# Patient Record
Sex: MALE | Race: WHITE | NOT HISPANIC OR LATINO | Employment: FULL TIME | URBAN - METROPOLITAN AREA
[De-identification: names, ages, dates, MRNs, and addresses within clinical notes are randomized per-mention and may not be internally consistent; named-entity substitution may affect disease eponyms.]

---

## 2017-02-07 ENCOUNTER — HOSPITAL ENCOUNTER (EMERGENCY)
Facility: CLINIC | Age: 47
Discharge: HOME | End: 2017-02-07
Attending: EMERGENCY MEDICINE | Admitting: EMERGENCY MEDICINE
Payer: COMMERCIAL

## 2017-02-07 ENCOUNTER — APPOINTMENT (OUTPATIENT)
Dept: GENERAL RADIOLOGY | Facility: CLINIC | Age: 47
End: 2017-02-07
Attending: EMERGENCY MEDICINE
Payer: COMMERCIAL

## 2017-02-07 VITALS
HEIGHT: 73 IN | TEMPERATURE: 97.9 F | DIASTOLIC BLOOD PRESSURE: 100 MMHG | RESPIRATION RATE: 20 BRPM | HEART RATE: 94 BPM | OXYGEN SATURATION: 97 % | SYSTOLIC BLOOD PRESSURE: 139 MMHG

## 2017-02-07 DIAGNOSIS — S83.92XA SPRAIN OF LEFT KNEE, UNSPECIFIED LIGAMENT, INITIAL ENCOUNTER: ICD-10-CM

## 2017-02-07 PROCEDURE — 99283 EMERGENCY DEPT VISIT LOW MDM: CPT

## 2017-02-07 PROCEDURE — 73560 X-RAY EXAM OF KNEE 1 OR 2: CPT | Mod: LT

## 2017-02-07 PROCEDURE — 99284 EMERGENCY DEPT VISIT MOD MDM: CPT | Performed by: EMERGENCY MEDICINE

## 2017-02-07 RX ORDER — HYDROCODONE BITARTRATE AND ACETAMINOPHEN 5; 325 MG/1; MG/1
TABLET ORAL
Qty: 20 TABLET | Refills: 0 | Status: SHIPPED | OUTPATIENT
Start: 2017-02-07 | End: 2017-06-26

## 2017-02-07 RX ORDER — CYCLOBENZAPRINE HCL 10 MG
10 TABLET ORAL 3 TIMES DAILY PRN
Qty: 20 TABLET | Refills: 0 | Status: SHIPPED | OUTPATIENT
Start: 2017-02-07 | End: 2017-02-13

## 2017-02-07 ASSESSMENT — ENCOUNTER SYMPTOMS
BACK PAIN: 0
ARTHRALGIAS: 1
NECK PAIN: 0

## 2017-02-07 NOTE — ED NOTES
fell in shower  on left knee  Patient having difficulty standing. Patient was recently in [facility] for hernia surgery  Patient is alert oriented   denies any other complaints

## 2017-02-07 NOTE — DISCHARGE INSTRUCTIONS
Knee Sprain    A sprain is an injury to the ligaments or capsule that holds a joint together. There are no broken bones. Most sprains take 3 to 6 weeks to heal. If it a severe sprain where the ligament is completely torn, it can take months to recover.  Most knee sprains are treated with a splint, knee immobilizer brace, or elastic wrap for support. Severe sprains may require surgery.  Home care    Stay off the injured leg as much as possible until you can walk on it without pain. If you have a lot of pain with walking, crutches or a walker may be prescribed. (These can be rented or purchased at many pharmacies and surgical or orthopedic supply stores). Follow your healthcare provider's advice about when to begin putting weight on that leg.    Keep your leg elevated to reduce pain and swelling. When sleeping, place a pillow under the injured leg. When sitting, support the injured leg so it is level with your waist. This is very important during the first 48 hours.    Apply an ice pack over the injured area for 15 to 20 minutes every 3 to 6 hours. You should do this for the first 24 to 48 hours. You can make an ice pack by filling a plastic bag that seals at the top with ice cubes and then wrapping it with a thin towel. Continue to use ice packs for relief of pain and swelling as needed. As the ice melts, be careful to avoid getting your wrap, splint, or cast wet. After 48 hours, apply heat (warm shower or warm bath) for 15 to 20 minutes several times a day, or alternate ice and heat. You can place the ice pack directly over the splint. If you have to wear a hook-and-loop knee brace, you can open it to apply the ice pack, or heat, directly to the knee. Never put ice directly on the skin. Always wrap the ice in a towel or other type of cloth.    You may use over-the-counter pain medicine to control pain, unless another pain medicine was prescribed.If you have chronic liver or kidney disease or ever had a stomach  ulcer or GI bleeding, talk with your healthcare provider before using these medicines.    If you were given a splint, keep it completely dry at all times. Bathe with your splint out of the water, protected with 2 large plastic bags, rubber-banded at the top end. If a fiberglass splint gets wet, you can dry it with a hair dryer. If you have a hook-and-loop knee brace, you can remove this to bathe, unless told otherwise.  Follow-up care  Follow up with your doctor as advised. Any X-rays you had today don t show any broken bones, breaks, or fractures. Sometimes fractures don t show up on the first X-ray. Bruises and sprains can sometimes hurt as much as a fracture. These injuries can take time to heal completely. If your symptoms don t improve or they get worse, talk with your doctor. You may need a repeat X-ray. If X-rays were taken, you will be told of any new findings that may affect your care.  Call 911  Call 911 if you have:     Shortness of breath     Chest pain  When to seek medical advice  Call your healthcare provider right away if any of these occur:    The splint or knee immobilizer brace becomes wet or soft    The fiberglass cast or splint remains wet for more than 24 hours    Pain or swelling increases    The injured leg or toes become cold, blue, numb, or tingly    3975-9116 [employer]. [address] [place] [address]. All rights reserved. This information is not intended as a substitute for professional medical care. Always follow your healthcare professional's instructions.

## 2017-02-07 NOTE — ED NOTES
Patient was getting ready for  Work when he fell this AM  The muscles in the leg are tightening up more now

## 2017-02-07 NOTE — ED AVS SNAPSHOT
[facility] Emergency Department    [address] [facility] [address] [address]    Phone:  [phone]    Fax:  [phone]                                       Ciro Monet   MRN: [id number]    Department:  [facility] Emergency Department   Date of Visit:  2/7/2017           After Visit Summary Signature Page     I have received my discharge instructions, and my questions have been answered. I have discussed any challenges I see with this plan with the nurse or doctor.    ..........................................................................................................................................  Patient/Patient Representative Signature      ..........................................................................................................................................  Patient Representative Print Name and Relationship to Patient    ..................................................               ................................................  Date                                            Time    ..........................................................................................................................................  Reviewed by Signature/Title    ...................................................              ..............................................  Date                                                            Time

## 2017-02-07 NOTE — ED PROVIDER NOTES
"  History     Chief Complaint   Patient presents with     Knee Injury     fell in shower  on left knee  Patient having difficulty standing      HPI  Ciro Monet is a 46 year old male who injured his left knee 3 weeks ago when he fell in the shower twisting the knee who aggravated the knee injury walking on stairs this morning.  He states that the left knee \"gave out\" causing him to fall backwards onto his buttocks.  He has pain in the anterior and posterior left knee with mild anterior swelling, but bruising or ecchymosis or associated distal CMS abnormality. Sharp, aching, constant, nonradiating severe pain is exacerbated by weight bearing and movement.  No other injury, trauma or acute complaints or concerns. No distal STS or other leg pain. No cough, hemoptysis, CP or SOB. No hx of DVT. Reports that he has a prior history of left knee problems which are deemed nonsurgical, and that he has had a prior MRI.  Previous records were reviewed:    7/18/16 MRI Right Knee without contrast:  IMPRESSION:   1. Small to moderate-sized tear at the junction of the anterior horn  and body of the lateral meniscus. There is additional extrusion of the  disc material beyond the joint margin.  2. Moderate chondromalacia in the posterior aspect of the lateral  compartment with mild chondromalacia of the patellofemoral joint.    I have reviewed the Medications, Allergies, Past Medical and Surgical History, and Social History in the Epic system.  Patient Active Problem List   Diagnosis     Moderate depressed bipolar I disorder (H)     Esophageal reflux     CHARITY (Obstructive Sleep Apnea)-severe ()     HYPERLIPIDEMIA LDL GOAL <160     Morbid obesity (H)     Localized superficial swelling, mass, or lump     Renal cyst needs ct 6/15     Incarcerated hernia     Umbilical hernia, incarcerated     Past Medical History   Diagnosis Date     CHARITY (obstructive sleep apnea) 4/2010       CPAP 15     Hiatal hernia      Morbid obesity " "with BMI of 45.0-49.9, adult (H)      Diabetes (H)      Past Surgical History   Procedure Laterality Date     Hc tooth extraction w/forcep       Hc repair epigastric hernia,reduc  11/23/07     Lasik  ?2005?     Hemorrhoid injection sclerosing solution       Herniorrhaphy umbilical N/A 10/29/2016     Procedure: HERNIORRHAPHY UMBILICAL;  Surgeon: Lori Barron MD;  Location: [facility] OR     No current facility-administered medications for this encounter.     Current Outpatient Prescriptions   Medication     HYDROcodone-acetaminophen (NORCO) 5-325 MG per tablet     cyclobenzaprine (FLEXERIL) 10 MG tablet     albuterol (ALBUTEROL) 108 (90 BASE) MCG/ACT inhaler     order for DME     metFORMIN (GLUCOPHAGE-XR) 500 MG 24 hr tablet     RISPERDAL CONSTA 50 MG injection     ABILIFY 15 MG tablet     OMEPRAZOLE     Allergies   Allergen Reactions     Adhesive Tape Rash     3M Plastic adhesive transpore tape per dad. Blisters and rash.     Pine Swelling and Difficulty breathing     Eyes swelling & difficultly breathing.     Social History   Substance Use Topics     Smoking status: Never Smoker      Smokeless tobacco: Never Used     Alcohol Use: No     Family History   Problem Relation Age of Onset     Hypertension Father      Genitourinary Problems Father      kidney stones     C.A.D. Maternal Grandfather      Genitourinary Problems Brother      kidney stones     Genitourinary Problems Brother      kidney stones       Review of Systems   Respiratory: Negative.    Cardiovascular: Negative.    Musculoskeletal: Positive for arthralgias (left knee injury and pain) and gait problem (unable to fully weight-bear on the left knee). Negative for back pain, joint swelling and neck pain.   Skin: Negative.      Physical Exam   BP: 133/85 mmHg  Pulse: 94  Temp: 97.9  F (36.6  C)  Resp: 20  Height: 185.4 cm (6' 1\")  SpO2: 93 %  Physical Exam   Constitutional: He is oriented to person, place, and time. He appears well-developed and " well-nourished. No distress.   Eyes: Conjunctivae and EOM are normal. No scleral icterus.   Cardiovascular: Normal rate, regular rhythm and intact distal pulses.    Pulmonary/Chest: Effort normal. No respiratory distress.   Musculoskeletal: Normal range of motion. He exhibits tenderness. He exhibits no edema or deformity.        Left knee: He exhibits swelling (small PP effusion) and effusion (small PP effusion). He exhibits normal range of motion, no ecchymosis, no deformity, no erythema, normal alignment, no LCL laxity, normal patellar mobility, no bony tenderness and no MCL laxity. Tenderness (anterior) found. No medial joint line, no lateral joint line, no MCL, no LCL and no patellar tendon tenderness noted.        Legs:  Neurological: He is alert and oriented to person, place, and time. He has normal strength. No sensory deficit.   Skin: Skin is warm and dry. No rash noted. No erythema. No pallor.   Psychiatric: He has a normal mood and affect. His behavior is normal.   Nursing note and vitals reviewed.      ED Course   Procedures         Results for orders placed or performed during the hospital encounter of 02/07/17   XR Knee Left 1/2 Views    Narrative    LEFT KNEE ONE TO TWO VIEWS 2/7/2017 8:36 AM     HISTORY: Pain, injury.    COMPARISON: None.      Impression    IMPRESSION: No acute-appearing bony abnormalities. Probable mild  medial joint space narrowing. Patellofemoral degenerative bony  spurring. Small joint space effusion.    JARRETT JIMENEZ MD   I independently reviewed the X-rays: Agree with the Radiologist's interpretation.       Labs Ordered and Resulted from Time of ED Arrival Up to the Time of Departure from the ED - No data to display    Assessments & Plan (with Medical Decision Making)   Patient with history of lateral meniscus tear with acute left knee injury walking downstairs this morning.  X-rays negative.  CMS function intact. Doubt DVT. Patient has a knee immobilizer/brace at home that he  can use.  He was provided crutches.  He requested an Rx for an opiate analgesic and muscle relaxant.  I prescribed 20 tablets and Norco and Flexeril.  I recommended he use NSAID first and use the Norco if needed.  He will follow-up with his orthopedist in the near future. Patient was provided instructions for supportive care and will return as needed for worsened condition or worsening symptoms, or new problems or concerns.     I have reviewed the nursing notes.    I have reviewed the findings, diagnosis, plan and need for follow up with the patient.    New Prescriptions    CYCLOBENZAPRINE (FLEXERIL) 10 MG TABLET    Take 1 tablet (10 mg) by mouth 3 times daily as needed for muscle spasms    HYDROCODONE-ACETAMINOPHEN (NORCO) 5-325 MG PER TABLET    1-2 tabs po q 4-6 hrs. prn pain       Final diagnoses:   Sprain of left knee, unspecified ligament, initial encounter       2/7/2017   [facility] EMERGENCY DEPARTMENT       Choco Stokes MD  02/13/17 0928

## 2017-02-07 NOTE — ED AVS SNAPSHOT
[facility] Emergency Department    [address] [facility] [address]    Phone:  [phone]    Fax:  [phone]                                       Ciro Monet   MRN: [id number]    Department:  [facility] Emergency Department   Date of Visit:  2/7/2017           Patient Information     Date Of Birth          1970        Your diagnoses for this visit were:     Sprain of left knee, unspecified ligament, initial encounter        You were seen by Choco Stokes MD.        Discharge Instructions         Knee Sprain    A sprain is an injury to the ligaments or capsule that holds a joint together. There are no broken bones. Most sprains take 3 to 6 weeks to heal. If it a severe sprain where the ligament is completely torn, it can take months to recover.  Most knee sprains are treated with a splint, knee immobilizer brace, or elastic wrap for support. Severe sprains may require surgery.  Home care    Stay off the injured leg as much as possible until you can walk on it without pain. If you have a lot of pain with walking, crutches or a walker may be prescribed. (These can be rented or purchased at many pharmacies and surgical or orthopedic supply stores). Follow your healthcare provider's advice about when to begin putting weight on that leg.    Keep your leg elevated to reduce pain and swelling. When sleeping, place a pillow under the injured leg. When sitting, support the injured leg so it is level with your waist. This is very important during the first 48 hours.    Apply an ice pack over the injured area for 15 to 20 minutes every 3 to 6 hours. You should do this for the first 24 to 48 hours. You can make an ice pack by filling a plastic bag that seals at the top with ice cubes and then wrapping it with a thin towel. Continue to use ice packs for relief of pain and swelling as needed. As the ice melts, be careful to avoid getting your wrap, splint, or cast wet. After 48 hours, apply  heat (warm shower or warm bath) for 15 to 20 minutes several times a day, or alternate ice and heat. You can place the ice pack directly over the splint. If you have to wear a hook-and-loop knee brace, you can open it to apply the ice pack, or heat, directly to the knee. Never put ice directly on the skin. Always wrap the ice in a towel or other type of cloth.    You may use over-the-counter pain medicine to control pain, unless another pain medicine was prescribed.If you have chronic liver or kidney disease or ever had a stomach ulcer or GI bleeding, talk with your healthcare provider before using these medicines.    If you were given a splint, keep it completely dry at all times. Bathe with your splint out of the water, protected with 2 large plastic bags, rubber-banded at the top end. If a fiberglass splint gets wet, you can dry it with a hair dryer. If you have a hook-and-loop knee brace, you can remove this to bathe, unless told otherwise.  Follow-up care  Follow up with your doctor as advised. Any X-rays you had today don t show any broken bones, breaks, or fractures. Sometimes fractures don t show up on the first X-ray. Bruises and sprains can sometimes hurt as much as a fracture. These injuries can take time to heal completely. If your symptoms don t improve or they get worse, talk with your doctor. You may need a repeat X-ray. If X-rays were taken, you will be told of any new findings that may affect your care.  Call 911  Call 911 if you have:     Shortness of breath     Chest pain  When to seek medical advice  Call your healthcare provider right away if any of these occur:    The splint or knee immobilizer brace becomes wet or soft    The fiberglass cast or splint remains wet for more than 24 hours    Pain or swelling increases    The injured leg or toes become cold, blue, numb, or tingly    5195-0336 [employer]. [address] [place] [address], [place] [address]. All rights reserved. This  information is not intended as a substitute for professional medical care. Always follow your healthcare professional's instructions.          24 Hour Appointment Hotline       To make an appointment at any [facility], call [phone] ([phone]). If you don't have a family doctor or clinic, we will help you find one. [facility] are conveniently located to serve the needs of you and your family.             Review of your medicines      START taking        Dose / Directions Last dose taken    cyclobenzaprine 10 MG tablet   Commonly known as:  FLEXERIL   Dose:  10 mg   Quantity:  20 tablet        Take 1 tablet (10 mg) by mouth 3 times daily as needed for muscle spasms   Refills:  0        HYDROcodone-acetaminophen 5-325 MG per tablet   Commonly known as:  NORCO   Quantity:  20 tablet        1-2 tabs po q 4-6 hrs. prn pain   Refills:  0          Our records show that you are taking the medicines listed below. If these are incorrect, please call your family doctor or clinic.        Dose / Directions Last dose taken    ABILIFY 15 MG tablet   Dose:  1 tablet   Generic drug:  ARIPiprazole        Take 1 tablet by mouth daily   Refills:  0        albuterol 108 (90 BASE) MCG/ACT Inhaler   Commonly known as:  albuterol   Quantity:  1 Inhaler        2 puffs at least TID and 2 puffs Q 4 hours prn.   Refills:  0        metFORMIN 500 MG 24 hr tablet   Commonly known as:  GLUCOPHAGE-XR        Takes four tablets in am   Refills:  3        OMEPRAZOLE        Refills:  0        order for DME        Equipment being ordered: BIPAP Patient Ciro Monet received a [employer] REMstar BiPap (60 Series) Bilevel. Pressures were set at 21/12 cm H2O.   Refills:  0        RISPERDAL CONSTA 50 MG injection   Dose:  50 mg   Generic drug:  risperiDONE microspheres        Inject 50 mg into the muscle every 14 days   Refills:  0                Prescriptions were sent or printed at these locations (2 Prescriptions)                    Other Prescriptions                Printed at Department/Unit printer (2 of 2)         HYDROcodone-acetaminophen (NORCO) 5-325 MG per tablet               cyclobenzaprine (FLEXERIL) 10 MG tablet                Procedures and tests performed during your visit     XR Knee Left 1/2 Views      Orders Needing Specimen Collection     None      Pending Results     Date and Time Order Name Status Description    2/7/2017 0802 XR Knee Left 1/2 Views Preliminary             Pending Culture Results     No orders found from 2/6/2017 to 2/8/2017.       Test Results from your hospital stay           2/7/2017  8:43 AM - Interface, Radiant Ib      Narrative     LEFT KNEE ONE TO TWO VIEWS 2/7/2017 8:36 AM     HISTORY: Pain, injury.    COMPARISON: None.        Impression     IMPRESSION: No acute-appearing bony abnormalities. Probable mild  medial joint space narrowing. Patellofemoral degenerative bony  spurring. Small joint space effusion.                Thank you for choosing [facility]       Thank you for choosing [facility] for your care. Our goal is always to provide you with excellent care. Hearing back from our patients is one way we can continue to improve our services. Please take a few minutes to complete the written survey that you may receive in the mail after you visit with us. Thank you!        [facility] Information     [facility] gives you secure access to your electronic health record. If you see a primary care provider, you can also send messages to your care team and make appointments. If you have questions, please call your primary care clinic.  If you do not have a primary care provider, please call [phone] and they will assist you.        Care EveryWhere ID     This is your Care EveryWhere ID. This could be used by other organizations to access your [facility] medical records  [id number]        After Visit Summary       This is your record. Keep this with you and show to your community pharmacist(s) and  doctor(s) at your next visit.

## 2017-02-09 ENCOUNTER — HOSPITAL ENCOUNTER (OUTPATIENT)
Dept: MRI IMAGING | Facility: CLINIC | Age: 47
Discharge: HOME | End: 2017-02-09
Attending: ORTHOPAEDIC SURGERY | Admitting: ORTHOPAEDIC SURGERY
Payer: COMMERCIAL

## 2017-02-09 DIAGNOSIS — M25.562 ACUTE PAIN OF LEFT KNEE: Primary | ICD-10-CM

## 2017-02-09 PROCEDURE — 73721 MRI JNT OF LWR EXTRE W/O DYE: CPT | Mod: LT

## 2017-02-13 ENCOUNTER — TELEPHONE (OUTPATIENT)
Dept: FAMILY MEDICINE | Facility: CLINIC | Age: 47
End: 2017-02-13

## 2017-02-13 DIAGNOSIS — E78.5 HYPERLIPIDEMIA LDL GOAL <160: Primary | ICD-10-CM

## 2017-02-13 ASSESSMENT — ENCOUNTER SYMPTOMS
RESPIRATORY NEGATIVE: 1
CARDIOVASCULAR NEGATIVE: 1
JOINT SWELLING: 0

## 2017-03-31 ENCOUNTER — OFFICE VISIT (OUTPATIENT)
Dept: FAMILY MEDICINE | Facility: CLINIC | Age: 47
End: 2017-03-31
Payer: COMMERCIAL

## 2017-03-31 ENCOUNTER — RADIANT APPOINTMENT (OUTPATIENT)
Dept: GENERAL RADIOLOGY | Facility: CLINIC | Age: 47
End: 2017-03-31
Attending: FAMILY MEDICINE
Payer: COMMERCIAL

## 2017-03-31 VITALS
OXYGEN SATURATION: 97 % | SYSTOLIC BLOOD PRESSURE: 118 MMHG | TEMPERATURE: 98.6 F | HEART RATE: 95 BPM | HEIGHT: 73 IN | BODY MASS INDEX: 41.75 KG/M2 | DIASTOLIC BLOOD PRESSURE: 82 MMHG | WEIGHT: 315 LBS

## 2017-03-31 DIAGNOSIS — R05.9 COUGH: ICD-10-CM

## 2017-03-31 DIAGNOSIS — J20.8 ACUTE BRONCHITIS DUE TO OTHER SPECIFIED ORGANISMS: Primary | ICD-10-CM

## 2017-03-31 PROCEDURE — 99214 OFFICE O/P EST MOD 30 MIN: CPT | Performed by: FAMILY MEDICINE

## 2017-03-31 PROCEDURE — 71020 XR CHEST 2 VW: CPT

## 2017-03-31 RX ORDER — ALBUTEROL SULFATE 90 UG/1
AEROSOL, METERED RESPIRATORY (INHALATION)
Qty: 1 INHALER | Refills: 0 | Status: SHIPPED | OUTPATIENT
Start: 2017-03-31 | End: 2018-12-14

## 2017-03-31 RX ORDER — CODEINE PHOSPHATE AND GUAIFENESIN 10; 100 MG/5ML; MG/5ML
1 SOLUTION ORAL EVERY 4 HOURS PRN
Qty: 120 ML | Refills: 0 | Status: SHIPPED | OUTPATIENT
Start: 2017-03-31 | End: 2017-06-26

## 2017-03-31 RX ORDER — ALBUTEROL SULFATE 90 UG/1
2 AEROSOL, METERED RESPIRATORY (INHALATION) EVERY 6 HOURS PRN
Qty: 1 INHALER | Refills: 0 | Status: CANCELLED | OUTPATIENT
Start: 2017-03-31

## 2017-03-31 RX ORDER — PREDNISONE 20 MG/1
40 TABLET ORAL DAILY
Qty: 10 TABLET | Refills: 0 | Status: SHIPPED | OUTPATIENT
Start: 2017-03-31 | End: 2017-04-05

## 2017-03-31 NOTE — NURSING NOTE
"Chief Complaint   Patient presents with     Cough       Initial /82 (BP Location: Right arm, Patient Position: Chair, Cuff Size: Adult Large)  Pulse 95  Temp 98.6  F (37  C) (Tympanic)  Ht 6' 1\" (1.854 m)  Wt (!) 370 lb 12.8 oz (168.2 kg)  SpO2 93%  BMI 48.92 kg/m2 Estimated body mass index is 48.92 kg/(m^2) as calculated from the following:    Height as of this encounter: 6' 1\" (1.854 m).    Weight as of this encounter: 370 lb 12.8 oz (168.2 kg).  Medication Reconciliation: complete   Tory Roberts LPN    "

## 2017-03-31 NOTE — PROGRESS NOTES
SUBJECTIVE:                                                    Ciro Monet is a 46 year old male who presents to clinic today for the following health issues:      ENT Symptoms             Symptoms: cc Present Absent Comment   Fever/Chills   x    Fatigue  x     Muscle Aches  x     Eye Irritation   x    Sneezing   x    Nasal Román/Drg   x    Sinus Pressure/Pain   x    Loss of smell   x    Dental pain   x    Sore Throat   x    Swollen Glands   x    Ear Pain/Fullness   x    Cough  x     Wheeze  x     Chest Pain   x Chest congestion    Shortness of breath  x     Rash   x    Other   x      Symptom duration:  past week    Symptom severity:  moderate    Treatments tried:  OTC cough medication    Contacts:       Cough and wheeze  Shortness of breath   Started about a week ago  No f/c   No sore throat or ear pain     Symptoms are improving overall   Over the counter cough med -delsym, helped a little     Has allergies  Maybe asthma?  He isn't sure.   Non smoker     Review of systems:   No f/c   No n/v   No d/c   Normal appetite        Problem list and histories reviewed & adjusted, as indicated.  Additional history: as documented      Patient Active Problem List   Diagnosis     Moderate depressed bipolar I disorder (H)     Esophageal reflux     CHARITY (Obstructive Sleep Apnea)-severe ()     HYPERLIPIDEMIA LDL GOAL <160     Morbid obesity (H)     Localized superficial swelling, mass, or lump     Renal cyst needs ct 6/15     Incarcerated hernia     Umbilical hernia, incarcerated     Current Outpatient Prescriptions   Medication     albuterol (ALBUTEROL) 108 (90 BASE) MCG/ACT inhaler     metFORMIN (GLUCOPHAGE-XR) 500 MG 24 hr tablet     RISPERDAL CONSTA 50 MG injection     ABILIFY 15 MG tablet     OMEPRAZOLE     HYDROcodone-acetaminophen (NORCO) 5-325 MG per tablet     order for DME     No current facility-administered medications for this visit.         Allergies   Allergen Reactions     Adhesive Tape Rash     3M  "Plastic adhesive transpore tape per dad. Blisters and rash.     Pine Swelling and Difficulty breathing     Eyes swelling & difficultly breathing.      /82 (BP Location: Right arm, Patient Position: Chair, Cuff Size: Adult Large)  Pulse 95  Temp 98.6  F (37  C) (Tympanic)  Ht 6' 1\" (1.854 m)  Wt (!) 370 lb 12.8 oz (168.2 kg)  SpO2 93%  BMI 48.92 kg/m2  GENERAL - Pt is alert and oriented in no acute distress.  Affect is flat, odd. Minimal eye contact.  HEET - Head is normocephalic, atraumatic.    PERRLA,EEMI. Conjunctiva are free of icterus or erythema.  TMs bilaterally normal.    Oropharynx free of masses and lesions, no tonsillar exudate or petechiae.    NECK - Neck is supple w/o LA or thyromegaly  RESPIRATORY - Clear to auscultation bilaterally.  No wheezing noted  CV - RRR, no murmurs, rubs, gallops.   Normal gait and station       chest x-ray   I independently visualized the xray and my findings were negative, similar to previous xray .   Radiology review pending.        Clinic course - He was given an albuterol neb treatment. Response was good, he felt better   O2 sats before the neb were 93 and afterwards were 98    Assessment/Plan -      (J20.8) Acute bronchitis due to other specified organisms  (primary encounter diagnosis)  Comment: Discussed diagnosis and treatment. Albuterol inhaler q4hrs. Prednisone burst. Nighttime cough syrup prn. Discussed likely viral etiology of his URI  and supportive cares. Recommended humidifier in the bedroom.  Recommend pushing fluids and tylenol for discomfort prn.  Recommended that he try to get good sleep.   Discussed importance of good hand hygiene.  Understands that he needs to return to clinic if symptoms persist, change, or worsen.  The patient indicates understanding of these issues and agrees with the plan.    Plan: predniSONE (DELTASONE) 20 MG tablet,         guaiFENesin-codeine (ROBITUSSIN AC) 100-10         MG/5ML SOLN solution            (R05) " Cough  Comment:   Plan: XR Chest 2 Views, albuterol (ALBUTEROL) 108 (90        BASE) MCG/ACT Inhaler             ERNESTINE Rae MD

## 2017-03-31 NOTE — NURSING NOTE
The following nebulizer treatment was given:     MEDICATION: Albuterol Sulfate 2.5 mg  : RISHI  LOT #: [id number]  EXPIRATION DATE:  8/2017  NDC # 2237-0432-58   Verbal order per Dr. Rae, verbal order read back.   Neb was given once in clinic at 4:00pm.   Sekou Jones CMA

## 2017-03-31 NOTE — MR AVS SNAPSHOT
"              After Visit Summary   3/31/2017    Ciro Monet    MRN: [id number]           Patient Information     Date Of Birth          1970        Visit Information        Provider Department      3/31/2017 3:30 PM Indira Rae MD [facility]        Today's Diagnoses     Acute bronchitis due to other specified organisms    -  1    Cough           Follow-ups after your visit        Who to contact     Normal or non-critical lab and imaging results will be communicated to you by 1jiajiehart, letter or phone within 4 business days after the clinic has received the results. If you do not hear from us within 7 days, please contact the clinic through 1jiajiehart or phone. If you have a critical or abnormal lab result, we will notify you by phone as soon as possible.  Submit refill requests through Ziippi or call your pharmacy and they will forward the refill request to us. Please allow 3 business days for your refill to be completed.          If you need to speak with a  for additional information , please call: [phone]             Additional Information About Your Visit        1jiajieharCooliris Information     Ziippi gives you secure access to your electronic health record. If you see a primary care provider, you can also send messages to your care team and make appointments. If you have questions, please call your primary care clinic.  If you do not have a primary care provider, please call [phone] and they will assist you.        Care EveryWhere ID     This is your Care EveryWhere ID. This could be used by other organizations to access your [place] medical records  [id number]        Your Vitals Were     Pulse Temperature Height Pulse Oximetry BMI (Body Mass Index)       95 98.6  F (37  C) (Tympanic) 6' 1\" (1.854 m) 97% 48.92 kg/m2        Blood Pressure from Last 3 Encounters:   03/31/17 118/82   02/07/17 (!) 139/100   12/07/16 138/86    Weight from Last 3 Encounters:   03/31/17 " (!) 370 lb 12.8 oz (168.2 kg)   12/05/16 (!) 366 lb 12.8 oz (166.4 kg)   11/15/16 (!) 367 lb 14.4 oz (166.9 kg)                 Today's Medication Changes          These changes are accurate as of: 3/31/17  4:51 PM.  If you have any questions, ask your nurse or doctor.               Start taking these medicines.        Dose/Directions    guaiFENesin-codeine 100-10 MG/5ML Soln solution   Commonly known as:  ROBITUSSIN AC   Used for:  Acute bronchitis due to other specified organisms   Started by:  Indira Rae MD        Dose:  1 tsp.   Take 5 mLs by mouth every 4 hours as needed for cough   Quantity:  120 mL   Refills:  0       predniSONE 20 MG tablet   Commonly known as:  DELTASONE   Used for:  Acute bronchitis due to other specified organisms   Started by:  Indira Rae MD        Dose:  40 mg   Take 2 tablets (40 mg) by mouth daily for 5 days   Quantity:  10 tablet   Refills:  0            Where to get your medicines      These medications were sent to [id number] - [place] [id number] [address]     Phone:  [phone]     albuterol 108 (90 BASE) MCG/ACT Inhaler    predniSONE 20 MG tablet         Some of these will need a paper prescription and others can be bought over the counter.  Ask your nurse if you have questions.     Bring a paper prescription for each of these medications     guaiFENesin-codeine 100-10 MG/5ML Soln solution                Primary Care Provider Office Phone # Fax #    Keegan Morgan [phone]       [facility] [address] [facility] [address]        Thank you!     Thank you for choosing [facility]  for your care. Our goal is always to provide you with excellent care. Hearing back from our patients is one way we can continue to improve our services. Please take a few minutes to complete the written survey that you may receive in the mail after your visit  with us. Thank you!             Your Updated Medication List - Protect others around you: Learn how to safely use, store and throw away your medicines at www.disposemymeds.org.          This list is accurate as of: 3/31/17  4:51 PM.  Always use your most recent med list.                   Brand Name Dispense Instructions for use    ABILIFY 15 MG tablet   Generic drug:  ARIPiprazole      Take 1 tablet by mouth daily       albuterol 108 (90 BASE) MCG/ACT Inhaler    albuterol    1 Inhaler    2 puffs at least TID and 2 puffs Q 4 hours prn.       guaiFENesin-codeine 100-10 MG/5ML Soln solution    ROBITUSSIN AC    120 mL    Take 5 mLs by mouth every 4 hours as needed for cough       HYDROcodone-acetaminophen 5-325 MG per tablet    NORCO    20 tablet    1-2 tabs po q 4-6 hrs. prn pain       metFORMIN 500 MG 24 hr tablet    GLUCOPHAGE-XR     Takes four tablets in am       OMEPRAZOLE          order for DME      Equipment being ordered: BIPAP Patient Ciro Monet received a Piggybackrstar BiPap (60 Series) Bilevel. Pressures were set at 21/12 cm H2O.       predniSONE 20 MG tablet    DELTASONE    10 tablet    Take 2 tablets (40 mg) by mouth daily for 5 days       RISPERDAL CONSTA 50 MG injection   Generic drug:  risperiDONE microspheres      Inject 50 mg into the muscle every 14 days

## 2017-06-26 ENCOUNTER — TELEPHONE (OUTPATIENT)
Dept: FAMILY MEDICINE | Facility: CLINIC | Age: 47
End: 2017-06-26

## 2017-06-26 ENCOUNTER — OFFICE VISIT (OUTPATIENT)
Dept: FAMILY MEDICINE | Facility: CLINIC | Age: 47
End: 2017-06-26
Payer: COMMERCIAL

## 2017-06-26 VITALS
TEMPERATURE: 98.5 F | HEIGHT: 73 IN | SYSTOLIC BLOOD PRESSURE: 119 MMHG | BODY MASS INDEX: 41.75 KG/M2 | OXYGEN SATURATION: 97 % | WEIGHT: 315 LBS | HEART RATE: 94 BPM | DIASTOLIC BLOOD PRESSURE: 80 MMHG

## 2017-06-26 DIAGNOSIS — G47.33 OSA (OBSTRUCTIVE SLEEP APNEA): Primary | ICD-10-CM

## 2017-06-26 DIAGNOSIS — Z00.00 ROUTINE GENERAL MEDICAL EXAMINATION AT A HEALTH CARE FACILITY: ICD-10-CM

## 2017-06-26 DIAGNOSIS — E78.5 HYPERLIPIDEMIA LDL GOAL <160: ICD-10-CM

## 2017-06-26 DIAGNOSIS — B35.9 DERMATOPHYTOSIS: ICD-10-CM

## 2017-06-26 DIAGNOSIS — E66.01 MORBID OBESITY DUE TO EXCESS CALORIES (H): ICD-10-CM

## 2017-06-26 DIAGNOSIS — Z00.01 ENCOUNTER FOR ROUTINE ADULT MEDICAL EXAM WITH ABNORMAL FINDINGS: ICD-10-CM

## 2017-06-26 DIAGNOSIS — F31.32 MODERATE DEPRESSED BIPOLAR I DISORDER (H): ICD-10-CM

## 2017-06-26 LAB
ALBUMIN SERPL-MCNC: 4.1 G/DL (ref 3.4–5)
ALP SERPL-CCNC: 84 U/L (ref 40–150)
ALT SERPL W P-5'-P-CCNC: 42 U/L (ref 0–70)
ANION GAP SERPL CALCULATED.3IONS-SCNC: 5 MMOL/L (ref 3–14)
AST SERPL W P-5'-P-CCNC: 26 U/L (ref 0–45)
BILIRUB SERPL-MCNC: 0.3 MG/DL (ref 0.2–1.3)
BUN SERPL-MCNC: 15 MG/DL (ref 7–30)
CALCIUM SERPL-MCNC: 9.5 MG/DL (ref 8.5–10.1)
CHLORIDE SERPL-SCNC: 108 MMOL/L (ref 94–109)
CHOLEST SERPL-MCNC: 146 MG/DL
CO2 SERPL-SCNC: 25 MMOL/L (ref 20–32)
CREAT SERPL-MCNC: 0.8 MG/DL (ref 0.66–1.25)
GFR SERPL CREATININE-BSD FRML MDRD: NORMAL ML/MIN/1.7M2
GLUCOSE SERPL-MCNC: 83 MG/DL (ref 70–99)
HBA1C MFR BLD: 5.4 % (ref 4.3–6)
HDLC SERPL-MCNC: 41 MG/DL
LDLC SERPL CALC-MCNC: 81 MG/DL
NONHDLC SERPL-MCNC: 105 MG/DL
POTASSIUM SERPL-SCNC: 4.3 MMOL/L (ref 3.4–5.3)
PROT SERPL-MCNC: 7.9 G/DL (ref 6.8–8.8)
SODIUM SERPL-SCNC: 138 MMOL/L (ref 133–144)
TRIGL SERPL-MCNC: 121 MG/DL

## 2017-06-26 PROCEDURE — 99396 PREV VISIT EST AGE 40-64: CPT | Performed by: FAMILY MEDICINE

## 2017-06-26 PROCEDURE — 83036 HEMOGLOBIN GLYCOSYLATED A1C: CPT | Performed by: FAMILY MEDICINE

## 2017-06-26 PROCEDURE — 80053 COMPREHEN METABOLIC PANEL: CPT | Performed by: FAMILY MEDICINE

## 2017-06-26 PROCEDURE — 80061 LIPID PANEL: CPT | Performed by: FAMILY MEDICINE

## 2017-06-26 PROCEDURE — 36415 COLL VENOUS BLD VENIPUNCTURE: CPT | Performed by: FAMILY MEDICINE

## 2017-06-26 NOTE — NURSING NOTE
"Chief Complaint   Patient presents with     Physical       Initial There were no vitals taken for this visit. Estimated body mass index is 48.92 kg/(m^2) as calculated from the following:    Height as of 3/31/17: 6' 1\" (1.854 m).    Weight as of 3/31/17: 370 lb 12.8 oz (168.2 kg).  Medication Reconciliation: complete   Aaliyah Joshua CMA    "

## 2017-06-26 NOTE — TELEPHONE ENCOUNTER
[facility] calling regarding Terbinafine gel.  They can not get the gel and wondering if they can fill Terbinafine 1% cream instead?  Please review and advise. Thank you..Leighann Chawla

## 2017-06-26 NOTE — LETTER
[facility]  [address]  [facility] [address]  Phone: [phone]    June 28, 2017    Ciro Monet  [address] [place] [address]   [facility] [address]              Dear Mr. Monet,    Labs look good              Sincerely,      Dr. Keegan Morgan/ daljit

## 2017-06-26 NOTE — MR AVS SNAPSHOT
After Visit Summary   6/26/2017    Ciro Monet    MRN: [id number]           Patient Information     Date Of Birth          1970        Visit Information        Provider Department      6/26/2017 3:40 PM Keegan Morgan MD [facility]        Today's Diagnoses     CHARITY (Obstructive Sleep Apnea)-severe ()    -  1    Routine general medical examination at a health care facility        Encounter for routine adult medical exam with abnormal findings        Moderate depressed bipolar I disorder (H)        Morbid obesity due to excess calories (H)        Hyperlipidemia LDL goal <160        Dermatophytosis          Care Instructions      Preventive Health Recommendations  Male Ages 40 to 49    Yearly exam:             See your health care provider every year in order to  o   Review health changes.   o   Discuss preventive care.    o   Review your medicines if your doctor has prescribed any.    You should be tested each year for STDs (sexually transmitted diseases) if you re at risk.     Have a cholesterol test every 5 years.     Have a colonoscopy (test for colon cancer) if someone in your family has had colon cancer or polyps before age 50.     After age 45, have a diabetes test (fasting glucose). If you are at risk for diabetes, you should have this test every 3 years.      Talk with your health care provider about whether or not a prostate cancer screening test (PSA) is right for you.    Shots: Get a flu shot each year. Get a tetanus shot every 10 years.     Nutrition:    Eat at least 5 servings of fruits and vegetables daily.     Eat whole-grain bread, whole-wheat pasta and brown rice instead of white grains and rice.     Talk to your provider about Calcium and Vitamin D.     Lifestyle    Exercise for at least 150 minutes a week (30 minutes a day, 5 days a week). This will help you control your weight and prevent disease.     Limit alcohol to one drink per day.     No smoking.  "    Wear sunscreen to prevent skin cancer.     See your dentist every six months for an exam and cleaning.              Follow-ups after your visit        Your next 10 appointments already scheduled     Jun 26, 2017  3:40 PM CDT   PHYSICAL with Keegan Morgan MD   [facility] ([facility])    [address] [facility] [id number] [phone]              Who to contact     Normal or non-critical lab and imaging results will be communicated to you by Pax Worldwidehart, letter or phone within 4 business days after the clinic has received the results. If you do not hear from us within 7 days, please contact the clinic through Only Mallorcat or phone. If you have a critical or abnormal lab result, we will notify you by phone as soon as possible.  Submit refill requests through [employer] or call your pharmacy and they will forward the refill request to us. Please allow 3 business days for your refill to be completed.          If you need to speak with a  for additional information , please call: [phone]             Additional Information About Your Visit        [employer] Information     [employer] gives you secure access to your electronic health record. If you see a primary care provider, you can also send messages to your care team and make appointments. If you have questions, please call your primary care clinic.  If you do not have a primary care provider, please call [phone] and they will assist you.        Care EveryWhere ID     This is your Care EveryWhere ID. This could be used by other organizations to access your [place] medical records  [id number]        Your Vitals Were     Pulse Temperature Height Pulse Oximetry BMI (Body Mass Index)       94 98.5  F (36.9  C) (Tympanic) 6' 1\" (1.854 m) 97% 48.79 kg/m2        Blood Pressure from Last 3 Encounters:   06/26/17 119/80   03/31/17 118/82   02/07/17 (!) 139/100    Weight from Last 3 Encounters:   06/26/17 (!) 369 lb 12.8 oz " (167.7 kg)   03/31/17 (!) 370 lb 12.8 oz (168.2 kg)   12/05/16 (!) 366 lb 12.8 oz (166.4 kg)              We Performed the Following     Comprehensive metabolic panel     Hemoglobin A1c     Lipid Profile with reflex to direct LDL          Today's Medication Changes          These changes are accurate as of: 6/26/17  3:35 PM.  If you have any questions, ask your nurse or doctor.               Start taking these medicines.        Dose/Directions    terbinafine 1 % Gel   Commonly known as:  LAMISIL ADVANCED   Used for:  Dermatophytosis   Started by:  Keegan Morgan MD        Dose:  0.25 inch   Apply 0.25 inches topically 2 times daily for 21 days For fungal infection not resolved with other antifungals (e.g. Clotrimazole)   Quantity:  1 Tube   Refills:  3            Where to get your medicines      These medications were sent to [facility] - [place] [address], [facility] [address]     Phone:  [phone]     terbinafine 1 % Gel                Primary Care Provider Office Phone # Fax #    Keegan Morgan [phone]       [facility] [address] [place] [address]        Equal Access to Services     FRANCHESCA HERNANDEZ AH: Hadii nella farrell hadasho Soomaali, waaxda luqadaha, qaybta kaalmada adeegyada, tram diamond. So [facility] [phone].    ATENCIÓN: Si habla español, tiene a crane disposición servicios gratuitos de asistencia lingüística. Llame al [phone].    We comply with applicable federal civil rights laws and [place] laws. We do not discriminate on the basis of race, color, national origin, age, disability sex, sexual orientation or gender identity.            Thank you!     Thank you for choosing [facility]3]  for your care. Our goal is always to provide you with excellent care. Hearing back from our patients is one way we can continue to improve our services. Please take a few minutes  to complete the written survey that you may receive in the mail after your visit with us. Thank you!             Your Updated Medication List - Protect others around you: Learn how to safely use, store and throw away your medicines at www.disposemymeds.org.          This list is accurate as of: 6/26/17  3:35 PM.  Always use your most recent med list.                   Brand Name Dispense Instructions for use Diagnosis    ABILIFY 15 MG tablet   Generic drug:  ARIPiprazole      Take 1 tablet by mouth daily        albuterol 108 (90 BASE) MCG/ACT Inhaler    albuterol    1 Inhaler    2 puffs at least TID and 2 puffs Q 4 hours prn.    Cough       metFORMIN 500 MG 24 hr tablet    GLUCOPHAGE-XR     Takes four tablets in am        OMEPRAZOLE           order for DME      Equipment being ordered: BIPAP Patient Ciro Monet received a Me[employer]s REMstar BiPap (60 Series) Bilevel. Pressures were set at 21/12 cm H2O.        RISPERDAL CONSTA 50 MG injection   Generic drug:  risperiDONE microspheres      Inject 50 mg into the muscle every 14 days        terbinafine 1 % Gel    LAMISIL ADVANCED    1 Tube    Apply 0.25 inches topically 2 times daily for 21 days For fungal infection not resolved with other antifungals (e.g. Clotrimazole)    Dermatophytosis

## 2017-06-26 NOTE — PROGRESS NOTES
SUBJECTIVE:     CC: Ciro Monet is an 46 year old male who presents for preventative health visit.     Healthy Habits:    Do you get at least three servings of calcium containing foods daily (dairy, green leafy vegetables, etc.)? yes    Amount of exercise or daily activities, outside of work: 30 minutes per day 3-4 days a week    Problems taking medications regularly No    Medication side effects: No    Have you had an eye exam in the past two years? yes    Do you see a dentist twice per year? yes  Do you have sleep apnea, excessive snoring or daytime drowsiness?Yes sleep apnea      doimng well abilify  Works well for him but hard to loose weight and it afects his blood sugars  On metformin for this  [facility]  Wt Readings from Last 10 Encounters:   06/26/17 (!) 369 lb 12.8 oz (167.7 kg)   03/31/17 (!) 370 lb 12.8 oz (168.2 kg)   12/05/16 (!) 366 lb 12.8 oz (166.4 kg)   11/15/16 (!) 367 lb 14.4 oz (166.9 kg)   11/09/16 (!) 365 lb 9.6 oz (165.8 kg)   11/03/16 (!) 371 lb 3.2 oz (168.4 kg)   05/27/16 (!) 370 lb (167.8 kg)   05/02/16 (!) 369 lb 9.6 oz (167.6 kg)   09/23/15 (!) 370 lb (167.8 kg)   09/22/15 (!) 371 lb (168.3 kg)     Patient informed that anything we discuss that is not related to preventative medicine, may be billed for; patient verbalizes understanding.    **He wants you to look at his left foot. And veins in legs.  **Possible blood work??        Today's PHQ-2 Score:   PHQ-2 ( 1999 Pfizer) 6/26/2017 5/2/2016   Q1: Little interest or pleasure in doing things 0 1   Q2: Feeling down, depressed or hopeless 0 1   PHQ-2 Score 0 2       Abuse: Current or Past(Physical, Sexual or Emotional)- No  Do you feel safe in your environment - Yes    Social History   Substance Use Topics     Smoking status: Never Smoker     Smokeless tobacco: Never Used     Alcohol use No     The patient does not drink >3 drinks per day nor >7 drinks per week.    Last PSA: No results found for:  "PSA    Recent Labs   Lab Test  07/30/16   0706  05/09/15   0728   11/02/13   0756   CHOL  147  149   --   159   HDL  44  44   --   45   LDL  86  93   < >  99   TRIG  87  62   --   80   CHOLHDLRATIO   --   3.4   --   4.0   NHDL  103   --    --    --     < > = values in this interval not displayed.       Reviewed orders with patient. Reviewed health maintenance and updated orders accordingly - Yes    Reviewed and updated as needed this visit by clinical staff  Tobacco  Allergies  Med Hx  Surg Hx  Fam Hx  Soc Hx        Reviewed and updated as needed this visit by Provider            ROS:  C: NEGATIVE for fever, chills, change in weight  I: NEGATIVE for worrisome rashes, moles or lesions  E: NEGATIVE for vision changes or irritation  ENT: NEGATIVE for ear, mouth and throat problems  R: NEGATIVE for significant cough or SOB  CV: NEGATIVE for chest pain, palpitations or peripheral edema  GI: NEGATIVE for nausea, abdominal pain, heartburn, or change in bowel habits   male: negative for dysuria, hematuria, decreased urinary stream, erectile dysfunction, urethral discharge  M: NEGATIVE for significant arthralgias or myalgia  N: NEGATIVE for weakness, dizziness or paresthesias  P: NEGATIVE for changes in mood or affect    Problem list, Medication list, Allergies, and Medical/Social/Surgical histories reviewed in EPIC and updated as appropriate.  OBJECTIVE:     /80 (BP Location: Right arm, Patient Position: Sitting, Cuff Size: Adult Large)  Pulse 94  Temp 98.5  F (36.9  C) (Tympanic)  Ht 6' 1\" (1.854 m)  Wt (!) 369 lb 12.8 oz (167.7 kg)  SpO2 97%  BMI 48.79 kg/m2  EXAM:  GENERAL: healthy, alert and no distress  NECK: no adenopathy, no asymmetry, masses, or scars and thyroid normal to palpation  RESP: lungs clear to auscultation - no rales, rhonchi or wheezes  CV: regular rate and rhythm, normal S1 S2, no S3 or S4, no murmur, click or rub, no peripheral edema and peripheral pulses strong  ABDOMEN: soft, " "nontender, no hepatosplenomegaly, no masses and bowel sounds normal  MS: no gross musculoskeletal defects noted, no edema    ASSESSMENT/PLAN:     1. Routine general medical examination at a health care facility    3. CHARITY (Obstructive Sleep Apnea)-severe ()  Good control.  Continue currant medications, continue to monito continue cpap    4. Moderate depressed bipolar I disorder (H)      5. Morbid obesity due to excess calories (H)  Continue to work on exercise    6. Hyperlipidemia LDL goal <160    - Hemoglobin A1c  - Lipid Profile with reflex to direct LDL  - Comprehensive metabolic panel    7. Dermatophytosis    - terbinafine (LAMISIL ADVANCED) 1 % GEL; Apply 0.25 inches topically 2 times daily for 21 days For fungal infection not resolved with other antifungals (e.g. Clotrimazole)  Dispense: 1 Tube; Refill: 3    COUNSELING:  Reviewed preventive health counseling, as reflected in patient instructions       Regular exercise       Healthy diet/nutrition       Vision screening       Hearing screening         reports that he has never smoked. He has never used smokeless tobacco.    Estimated body mass index is 48.79 kg/(m^2) as calculated from the following:    Height as of this encounter: 6' 1\" (1.854 m).    Weight as of this encounter: 369 lb 12.8 oz (167.7 kg).   Weight management plan: Discussed healthy diet and exercise guidelines and patient will follow up in 3 months in clinic to re-evaluate.    Counseling Resources:  ATP IV Guidelines  Pooled Cohorts Equation Calculator  FRAX Risk Assessment  ICSI Preventive Guidelines  Dietary Guidelines for Americans, 2010  USDA's MyPlate  ASA Prophylaxis  Lung CA Screening    Keegan Morgan MD  [facility]  "

## 2017-06-27 RX ORDER — PRENATAL VIT 91/IRON/FOLIC/DHA 28-975-200
COMBINATION PACKAGE (EA) ORAL 2 TIMES DAILY
Qty: 30 G | Refills: 1 | Status: SHIPPED | OUTPATIENT
Start: 2017-06-27 | End: 2019-05-08

## 2017-10-19 ENCOUNTER — ALLIED HEALTH/NURSE VISIT (OUTPATIENT)
Dept: FAMILY MEDICINE | Facility: CLINIC | Age: 47
End: 2017-10-19
Payer: COMMERCIAL

## 2017-10-19 DIAGNOSIS — Z23 NEED FOR PROPHYLACTIC VACCINATION AND INOCULATION AGAINST INFLUENZA: Primary | ICD-10-CM

## 2017-10-19 PROCEDURE — 99207 ZZC NO CHARGE NURSE ONLY: CPT

## 2017-10-19 PROCEDURE — 90686 IIV4 VACC NO PRSV 0.5 ML IM: CPT

## 2017-10-19 PROCEDURE — 90471 IMMUNIZATION ADMIN: CPT

## 2017-10-19 NOTE — MR AVS SNAPSHOT
After Visit Summary   10/19/2017    Ciro Monet    MRN: [id number]           Patient Information     Date Of Birth          1970        Visit Information        Provider Department      10/19/2017 3:25 PM [facility] FLU SHOT CLINIC [facility]        Today's Diagnoses     Need for prophylactic vaccination and inoculation against influenza    -  1       Follow-ups after your visit        Who to contact     If you have questions or need follow up information about today's clinic visit or your schedule please contact [facility] directly at [phone].  Normal or non-critical lab and imaging results will be communicated to you by [employer], letter or phone within 4 business days after the clinic has received the results. If you do not hear from us within 7 days, please contact the clinic through [employer] or phone. If you have a critical or abnormal lab result, we will notify you by phone as soon as possible.  Submit refill requests through [employer] or call your pharmacy and they will forward the refill request to us. Please allow 3 business days for your refill to be completed.          Additional Information About Your Visit        MyChart Information     [employer] gives you secure access to your electronic health record. If you see a primary care provider, you can also send messages to your care team and make appointments. If you have questions, please call your primary care clinic.  If you do not have a primary care provider, please call [phone] and they will assist you.        Care EveryWhere ID     This is your Care EveryWhere ID. This could be used by other organizations to access your [facility] medical records  [id number]         Blood Pressure from Last 3 Encounters:   06/26/17 119/80   03/31/17 118/82   02/07/17 (!) 139/100    Weight from Last 3 Encounters:   06/26/17 (!) 369 lb 12.8 oz (167.7 kg)   03/31/17 (!) 370 lb 12.8 oz (168.2 kg)   12/05/16 (!) 366 lb 12.8 oz  (166.4 kg)              We Performed the Following     FLU VAC, SPLIT VIRUS IM > 3 YO (QUADRIVALENT) [92813]     Vaccine Administration, Initial [26149]        Primary Care Provider Office Phone # Fax #    Keegan Morgan [phone]       [facility] [address] [address] [facility] [address]        Equal Access to Services     Or[facility] HospitalFAUSTINA : Hadii aad ku hadasho Soomaali, waaxda luqadaha, qaybta kaalmada adeegyada, waxay idiin hayaan adeeg kharash la'aan . So [facility] [phone].    ATENCIÓN: Si habla español, tiene a crane disposición servicios gratuitos de asistencia lingüística. Ana Maríaame al [phone].    We comply with applicable federal civil rights laws and [place] laws. We do not discriminate on the basis of race, color, national origin, age, disability, sex, sexual orientation, or gender identity.            Thank you!     Thank you for choosing [facility]  for your care. Our goal is always to provide you with excellent care. Hearing back from our patients is one way we can continue to improve our services. Please take a few minutes to complete the written survey that you may receive in the mail after your visit with us. Thank you!             Your Updated Medication List - Protect others around you: Learn how to safely use, store and throw away your medicines at www.disposemymeds.org.          This list is accurate as of: 10/19/17  3:48 PM.  Always use your most recent med list.                   Brand Name Dispense Instructions for use Diagnosis    ABILIFY 15 MG tablet   Generic drug:  ARIPiprazole      Take 1 tablet by mouth daily        albuterol 108 (90 BASE) MCG/ACT Inhaler    PROAIR HFA    1 Inhaler    2 puffs at least TID and 2 puffs Q 4 hours prn.    Cough       metFORMIN 500 MG 24 hr tablet    GLUCOPHAGE-XR     Takes four tablets in am        OMEPRAZOLE           order for DME      Equipment being ordered: BIPAP Patient Ciro DANK Chiki received a [employer]andi  REMstar BiPap (60 Series) Bilevel. Pressures were set at 21/12 cm H2O.        RISPERDAL CONSTA 50 MG injection   Generic drug:  risperiDONE microspheres      Inject 50 mg into the muscle every 14 days        terbinafine 1 % cream    lamISIL AT    30 g    Apply topically 2 times daily For fungal infection not resolved with other antifungals (e.g. Clotrimazole)    Dermatophytosis

## 2017-10-19 NOTE — PROGRESS NOTES

## 2017-11-27 ENCOUNTER — MEDICAL CORRESPONDENCE (OUTPATIENT)
Dept: HEALTH INFORMATION MANAGEMENT | Facility: CLINIC | Age: 47
End: 2017-11-27

## 2017-12-06 ENCOUNTER — TELEPHONE (OUTPATIENT)
Dept: FAMILY MEDICINE | Facility: CLINIC | Age: 47
End: 2017-12-06

## 2017-12-06 NOTE — TELEPHONE ENCOUNTER
Patient notified of plans as noted below. He said that he has already picked up the medication so he said he will call [facility] and go to [facility] and get the injection. He said he will not come here for injections until the plans below are in place.  Julio Velásquez RN

## 2017-12-06 NOTE — TELEPHONE ENCOUNTER
Message left on cell  answering machine to call the [facility] RN back. Tried calling home number and there was no answer and no machine. Tried calling Dr. Padma Lorenzo at [facility] at [phone] to find out about abilify injection order. Is he supposed to go there? Patient is on the RN schedule for an abilify injection. I spoke with Kalyn in [facility] and there is no order there.  Julio Velásquez, RN

## 2017-12-06 NOTE — TELEPHONE ENCOUNTER
Carrie RN with Dr. Lorenzo at [facility]  [phone]; called and said that Ciro is not on abilify. She said that he gets risperidone injections twice a month. He did have a homecare nurse that was giving them. Carrie said that he gets the medication from the [facility]. I informed Carrie that [facility] does not allow us to inject any medications brought in to us. We need to get them from the [facility] here. Carrie said that she would call Ciro too and inform him that if he has already picked up the medication; that he could go there to get it. Carrie said that Ciro would like to come here as our clinic is closer. I advised that she fax the medication order from Dr. Lorenzo to Dr. Morgan at [phone] and we will seek Dr. Morgan's advice. If OK to give here; the medication order would have to go to [facility]. Carrie agreed with plan.   Julio Velásquez RN

## 2017-12-07 NOTE — TELEPHONE ENCOUNTER
Spoke with Carrie today and she is faxing an order ([phone]) to Dr. Morgan for order of risperidone injection, to include frequency and dosing.  Pt to call and schedule.  Pt to receive injection tomorrow and is scheduled to receive every 2 weeks.    Venita PARSONS RN

## 2017-12-08 ENCOUNTER — MEDICAL CORRESPONDENCE (OUTPATIENT)
Dept: HEALTH INFORMATION MANAGEMENT | Facility: CLINIC | Age: 47
End: 2017-12-08

## 2017-12-11 ENCOUNTER — TELEPHONE (OUTPATIENT)
Dept: FAMILY MEDICINE | Facility: CLINIC | Age: 47
End: 2017-12-11

## 2017-12-11 DIAGNOSIS — F25.9 SCHIZOAFFECTIVE DISORDER, CHRONIC CONDITION (H): Primary | ICD-10-CM

## 2017-12-11 NOTE — TELEPHONE ENCOUNTER
Dr. Morgan: Please see order from Dr. Padma Lorenzo at your desk and advise orders/request for patient to come here every 2 weeks to get a risperadol injection. Last injection was given on 12/8/17 by [facility] in [place]. Thank you.  Julio Velásquez RN

## 2017-12-11 NOTE — TELEPHONE ENCOUNTER
"Lucina in Pharmacy says:  \" It is very important that the patient or caregiver understands that they would need to contact the pharmacy prior to future admins (probably will need to contact the clinic for appointment and then also contact pharmacy for a refill)-and it will take one business day for us to get the product in.\"   Spoke with Osiel Plata. He said that Ciro will be getting a shot at [facility] on 12/15/17 as Ciro will be in [place] on 12/22/17. So the next time he will be due for a risperdal consta shot here at [place] will be 12/29/17. Appointment made on the RN schedule for 12/29/17 for 3:30 PM. Lucina in Pharmacy notified. Boni notified to have Ciro call the pharmacy on 12/26/17 to make sure that the medication is ordered. [facility] as this is the first time that this is being done here at [place]. RN, on 12/26/17 please follow up with Pharmacy to make sure they order medication and call patient to make sure that he comes on 3:30 PM on 12/29/17. Thank you.   Julio Velásquez, RN    "

## 2017-12-20 NOTE — TELEPHONE ENCOUNTER
Carrie from [facility] called and she was assured that plan is in place for Ciro to come here for injections. She said that we can call there anytime if we have questions. Added her name and number to comments on demographics. On 12/26/17; RN please read note below. Thank you.   Julio Velásquez RN

## 2017-12-26 NOTE — TELEPHONE ENCOUNTER
Spoke with La in [facility] and confirmed that medication will be ordered, per insurance tomorrow and will be available for pt's injection on Friday.  Called pt and left a VM to remind him of his appointment on Friday at 3:30.    Venita PARSONS RN

## 2017-12-29 ENCOUNTER — ALLIED HEALTH/NURSE VISIT (OUTPATIENT)
Dept: FAMILY MEDICINE | Facility: CLINIC | Age: 47
End: 2017-12-29
Payer: COMMERCIAL

## 2017-12-29 DIAGNOSIS — F31.32 MODERATE DEPRESSED BIPOLAR I DISORDER (H): Primary | ICD-10-CM

## 2017-12-29 PROCEDURE — 96372 THER/PROPH/DIAG INJ SC/IM: CPT

## 2017-12-29 PROCEDURE — 99207 ZZC NO CHARGE NURSE ONLY: CPT

## 2018-01-23 ENCOUNTER — TELEPHONE (OUTPATIENT)
Dept: FAMILY MEDICINE | Facility: CLINIC | Age: 48
End: 2018-01-23

## 2018-01-23 NOTE — TELEPHONE ENCOUNTER
"Boni, Dad, notified that the risperdal medication arrived today and that Ciro should schedule an appointment 2 weeks after previous dose. Chart indicates that the last time he received shot at [place] was on 12/29/17. Dad said that he did get one already in January at [facility]. Boni also said that the psychiatrist was going to change Ciro's medication so that Ciro would not have to go through the hassle of medication being denied at the pharmacy because of insurance. Boni wants to know if the shot will be covered by Ciro's insurance if he gets it at [facility] . \"He has private insurance and [place].\"  Julio Velásquez RN      "

## 2018-01-23 NOTE — TELEPHONE ENCOUNTER
Spoke with ENZO Rider as she had been working with this. Adry advised that I call Gardenia in Pharmacy Services at [phone]. Message left on Gardenia's voicemail to call the Clinic RN back.   Julio Velásquez RN

## 2018-01-23 NOTE — TELEPHONE ENCOUNTER
Received two 50 mg injections and placed in clinic refrigerator. Tried calling home number; not answer and no machine. Message left on his parent's vm to call the Clinic RN back.  Julio Velásquez RN

## 2018-01-23 NOTE — TELEPHONE ENCOUNTER
Spoke with ENZO Rider and Adry said that she will discuss with Mary, our manager; here on Thursday when both Adry and Mary are here. Risperdal Delivery Ticket is at the RN desk. Message left on [phone] number ara Tanner and his Dad, Boni; know that we do not have the answer yet but are seeking our manager's assistance as to finding out the billing/coverage process for the risperdal.  Julio Velásquez RN

## 2018-01-23 NOTE — TELEPHONE ENCOUNTER
Gardenia in Pharmacy Services said that being that the risperdal came from [facility], means that the patient has not paid for the risperdal yet. Gardenia said a Prior Auth needed to be done as the next step to find out if it is covered/or how much of it is covered.  Julio Velásquez RN

## 2018-01-25 NOTE — TELEPHONE ENCOUNTER
PAtient is not going to be taking the risperidol any more. The doctor at [facility] is switching him to another medication.   Adry Bone RN

## 2018-01-25 NOTE — TELEPHONE ENCOUNTER
Spoke with Antonieta. At this point, we need to start a prior auth and call patient's insurance to see if it will be covered under medical. Will route to Debby Wade.  Adry Bone RN

## 2018-02-14 ENCOUNTER — OFFICE VISIT (OUTPATIENT)
Dept: FAMILY MEDICINE | Facility: CLINIC | Age: 48
End: 2018-02-14
Payer: COMMERCIAL

## 2018-02-14 VITALS
OXYGEN SATURATION: 96 % | WEIGHT: 315 LBS | HEIGHT: 73 IN | BODY MASS INDEX: 41.75 KG/M2 | DIASTOLIC BLOOD PRESSURE: 89 MMHG | TEMPERATURE: 98.5 F | HEART RATE: 92 BPM | SYSTOLIC BLOOD PRESSURE: 136 MMHG

## 2018-02-14 DIAGNOSIS — J20.9 ACUTE BRONCHITIS, UNSPECIFIED ORGANISM: Primary | ICD-10-CM

## 2018-02-14 PROCEDURE — 99213 OFFICE O/P EST LOW 20 MIN: CPT | Performed by: FAMILY MEDICINE

## 2018-02-14 RX ORDER — DOXYCYCLINE HYCLATE 100 MG
100 TABLET ORAL 2 TIMES DAILY
Qty: 20 TABLET | Refills: 0 | Status: SHIPPED | OUTPATIENT
Start: 2018-02-14 | End: 2018-12-14

## 2018-02-14 NOTE — MR AVS SNAPSHOT
"              After Visit Summary   2/14/2018    Ciro Monet    MRN: [id number]           Patient Information     Date Of Birth          1970        Visit Information        Provider Department      2/14/2018 4:00 PM Keegan Morgan MD [facility]        Today's Diagnoses     Acute bronchitis, unspecified organism    -  1       Follow-ups after your visit        Who to contact     Normal or non-critical lab and imaging results will be communicated to you by [employer], letter or phone within 4 business days after the clinic has received the results. If you do not hear from us within 7 days, please contact the clinic through [employer] or phone. If you have a critical or abnormal lab result, we will notify you by phone as soon as possible.  Submit refill requests through [employer] or call your pharmacy and they will forward the refill request to us. Please allow 3 business days for your refill to be completed.          If you need to speak with a  for additional information , please call: [phone]             Additional Information About Your Visit        [employer] Information     [employer] gives you secure access to your electronic health record. If you see a primary care provider, you can also send messages to your care team and make appointments. If you have questions, please call your primary care clinic.  If you do not have a primary care provider, please call [phone] and they will assist you.        Care EveryWhere ID     This is your Care EveryWhere ID. This could be used by other organizations to access your [facility] medical records  [id number]        Your Vitals Were     Pulse Temperature Height Pulse Oximetry BMI (Body Mass Index)       92 98.5  F (36.9  C) (Tympanic) 6' 1\" (1.854 m) 96% 51.08 kg/m2        Blood Pressure from Last 3 Encounters:   02/14/18 136/89   06/26/17 119/80   03/31/17 118/82    Weight from Last 3 Encounters:   02/14/18 (!) 387 lb 3.2 oz (175.6 kg) "   06/26/17 (!) 369 lb 12.8 oz (167.7 kg)   03/31/17 (!) 370 lb 12.8 oz (168.2 kg)              Today, you had the following     No orders found for display         Today's Medication Changes          These changes are accurate as of 2/14/18 11:59 PM.  If you have any questions, ask your nurse or doctor.               Start taking these medicines.        Dose/Directions    doxycycline 100 MG tablet   Commonly known as:  VIBRA-TABS   Used for:  Acute bronchitis, unspecified organism   Started by:  Keegan Morgan MD        Dose:  100 mg   Take 1 tablet (100 mg) by mouth 2 times daily   Quantity:  20 tablet   Refills:  0         Stop taking these medicines if you haven't already. Please contact your care team if you have questions.     RISPERDAL CONSTA 50 MG injection   Generic drug:  risperiDONE microspheres   Stopped by:  Keegan Morgan MD           risperiDONE microspheres 50 MG injection   Commonly known as:  RISPERDAL CONSTA   Stopped by:  Keegan Morgan MD                Where to get your medicines      These medications were sent to [id number] - [address] [address]     Phone:  [phone]     doxycycline 100 MG tablet                Primary Care Provider Office Phone # Fax #    Keegan Morgan [phone]       [facility] [id number] [place] [address]        Equal Access to Services     TIANA HERNANDEZ AH: Hadii nella farrell hadasho Soomaali, waaxda luqadaha, qaybta kaalmada adeegyada, tram diamond. So [facility] [phone].    ATENCIÓN: Si habla español, tiene a crane disposición servicios gratuitos de asistencia lingüística. Marylou al [phone].    We comply with applicable federal civil rights laws and [place] laws. We do not discriminate on the basis of race, color, national origin, age, disability, sex, sexual orientation, or gender identity.            Thank you!      Thank you for choosing [facility]  for your care. Our goal is always to provide you with excellent care. Hearing back from our patients is one way we can continue to improve our services. Please take a few minutes to complete the written survey that you may receive in the mail after your visit with us. Thank you!             Your Updated Medication List - Protect others around you: Learn how to safely use, store and throw away your medicines at www.disposemymeds.org.          This list is accurate as of 2/14/18 11:59 PM.  Always use your most recent med list.                   Brand Name Dispense Instructions for use Diagnosis    ABILIFY 15 MG tablet   Generic drug:  ARIPiprazole      Take 1 tablet by mouth daily        albuterol 108 (90 BASE) MCG/ACT Inhaler    PROAIR HFA    1 Inhaler    2 puffs at least TID and 2 puffs Q 4 hours prn.    Cough       doxycycline 100 MG tablet    VIBRA-TABS    20 tablet    Take 1 tablet (100 mg) by mouth 2 times daily    Acute bronchitis, unspecified organism       metFORMIN 500 MG 24 hr tablet    GLUCOPHAGE-XR     Takes four tablets in am        OMEPRAZOLE           order for DME      Equipment being ordered: BIPAP Patient Ciro Monet received a Smart GPS Backpack REMstar BiPap (60 Series) Bilevel. Pressures were set at 21/12 cm H2O.        terbinafine 1 % cream    lamISIL AT    30 g    Apply topically 2 times daily For fungal infection not resolved with other antifungals (e.g. Clotrimazole)    Dermatophytosis

## 2018-02-14 NOTE — NURSING NOTE
"Chief Complaint   Patient presents with     Cough       Initial /89 (BP Location: Right arm, Patient Position: Sitting, Cuff Size: Adult Large)  Pulse 92  Temp 98.5  F (36.9  C) (Tympanic)  Ht 6' 1\" (1.854 m)  Wt (!) 387 lb 3.2 oz (175.6 kg)  SpO2 96%  BMI 51.08 kg/m2 Estimated body mass index is 51.08 kg/(m^2) as calculated from the following:    Height as of this encounter: 6' 1\" (1.854 m).    Weight as of this encounter: 387 lb 3.2 oz (175.6 kg).  Medication Reconciliation: complete   Aaliyah Joshua CMA  "

## 2018-02-14 NOTE — PROGRESS NOTES
SUBJECTIVE:                                                    Ciro Monet is a 47 year old male who presents to clinic today for the following health issues:    Acute Illness   Acute illness concerns: cough  Onset: a week    Fever: no     Chills/Sweats: YES- a little warm    Headache (location?): no     Sinus Pressure:YES    Conjunctivitis:  YES: bilateral    Ear Pain: no    Rhinorrhea: no     Congestion: no     Sore Throat: no. Feels really dry     Cough: YES-productive of yellow sputum    Wheeze: YES    Decreased Appetite: no     Nausea: no     Vomiting: no     Diarrhea:  no     Dysuria/Freq.: no     Fatigue/Achiness: no     Sick/Strep Exposure: not that he is aware of     Therapies Tried and outcome: delsum    **He is also thinking that if he switches his cpap mask that may help.     **Also started a new medication.      Problem list and histories reviewed & adjusted, as indicated.  Additional history:     Patient Active Problem List   Diagnosis     Moderate depressed bipolar I disorder (H)     Esophageal reflux     CHARITY (Obstructive Sleep Apnea)-severe ()     HYPERLIPIDEMIA LDL GOAL <160     Morbid obesity (H)     Localized superficial swelling, mass, or lump     Renal cyst needs ct 6/15     Incarcerated hernia     Umbilical hernia, incarcerated     Schizoaffective disorder, chronic condition (H)     Past Surgical History:   Procedure Laterality Date     HC REPAIR EPIGASTRIC HERNIA,REDUC  11/23/07     HC TOOTH EXTRACTION W/FORCEP       HEMORRHOID INJECTION SCLEROSING SOLUTION       HERNIORRHAPHY UMBILICAL N/A 10/29/2016    Procedure: HERNIORRHAPHY UMBILICAL;  Surgeon: Lori Barron MD;  Location:  OR     [facility]  ?2005?       Social History   Substance Use Topics     Smoking status: Never Smoker     Smokeless tobacco: Never Used     Alcohol use No     Family History   Problem Relation Age of Onset     Hypertension Father      Genitourinary Problems Father      kidney stones     C.A.D. Maternal  "Grandfather      Genitourinary Problems Brother      kidney stones     Genitourinary Problems Brother      kidney stones           ROS:  Constitutional, HEENT, cardiovascular, pulmonary, gi and gu systems are negative, except as otherwise noted.    OBJECTIVE:                                                    /89 (BP Location: Right arm, Patient Position: Sitting, Cuff Size: Adult Large)  Pulse 92  Temp 98.5  F (36.9  C) (Tympanic)  Ht 6' 1\" (1.854 m)  Wt (!) 387 lb 3.2 oz (175.6 kg)  SpO2 96%  BMI 51.08 kg/m2 Body mass index is 51.08 kg/(m^2).   GENERAL: healthy, alert, well nourished, well hydrated, no distress  NECK: no tenderness, no adenopathy, no asymmetry, no masses, no stiffness; thyroid- normal to palpation  RESP: lungs clear to auscultation - no rales, no rhonchi, no wheezes  CV: regular rates and rhythm, normal S1 S2, no S3 or S4 and no murmur, no click or rub -  ABDOMEN: soft, no tenderness, no  hepatosplenomegaly, no masses, normal bowel sounds       ASSESSMENT/PLAN:                                                      (J20.9) Acute bronchitis, unspecified organism  (primary encounter diagnosis)    Plan: doxycycline (VIBRA-TABS) 100 MG tablet            reports that he has never smoked. He has never used smokeless tobacco.    Weight management plan: Discussed healthy diet and exercise guidelines and patient will follow up in 12 months in clinic to re-evaluate.      [facility]    "

## 2018-06-19 DIAGNOSIS — G47.33 OSA (OBSTRUCTIVE SLEEP APNEA): Primary | ICD-10-CM

## 2018-07-05 DIAGNOSIS — F25.1 SCHIZOAFFECTIVE DISORDER, DEPRESSIVE TYPE (H): Primary | ICD-10-CM

## 2018-07-05 LAB
CHOLEST SERPL-MCNC: 137 MG/DL
HBA1C MFR BLD: 5.7 % (ref 0–5.6)
HDLC SERPL-MCNC: 43 MG/DL
LDLC SERPL CALC-MCNC: 82 MG/DL
NONHDLC SERPL-MCNC: 94 MG/DL
TRIGL SERPL-MCNC: 58 MG/DL

## 2018-07-05 PROCEDURE — 36415 COLL VENOUS BLD VENIPUNCTURE: CPT | Performed by: PSYCHIATRY & NEUROLOGY

## 2018-07-05 PROCEDURE — 80061 LIPID PANEL: CPT | Performed by: PSYCHIATRY & NEUROLOGY

## 2018-07-05 PROCEDURE — 83036 HEMOGLOBIN GLYCOSYLATED A1C: CPT | Performed by: PSYCHIATRY & NEUROLOGY

## 2018-12-14 ENCOUNTER — OFFICE VISIT (OUTPATIENT)
Dept: FAMILY MEDICINE | Facility: CLINIC | Age: 48
End: 2018-12-14
Payer: COMMERCIAL

## 2018-12-14 VITALS
SYSTOLIC BLOOD PRESSURE: 135 MMHG | HEART RATE: 95 BPM | BODY MASS INDEX: 52.95 KG/M2 | DIASTOLIC BLOOD PRESSURE: 84 MMHG | TEMPERATURE: 99 F | WEIGHT: 315 LBS | OXYGEN SATURATION: 96 %

## 2018-12-14 DIAGNOSIS — J34.89 SINUS PRESSURE: Primary | ICD-10-CM

## 2018-12-14 DIAGNOSIS — R05.9 COUGH: ICD-10-CM

## 2018-12-14 PROCEDURE — 99213 OFFICE O/P EST LOW 20 MIN: CPT | Performed by: PHYSICIAN ASSISTANT

## 2018-12-14 RX ORDER — FLUTICASONE PROPIONATE 50 MCG
1-2 SPRAY, SUSPENSION (ML) NASAL DAILY
Qty: 1 BOTTLE | Refills: 11 | Status: SHIPPED | OUTPATIENT
Start: 2018-12-14 | End: 2019-12-14

## 2018-12-14 RX ORDER — PRENATAL VIT 91/IRON/FOLIC/DHA 28-975-200
COMBINATION PACKAGE (EA) ORAL 2 TIMES DAILY
Qty: 30 G | Refills: 1 | Status: CANCELLED | OUTPATIENT
Start: 2018-12-14

## 2018-12-14 RX ORDER — DOXYCYCLINE 100 MG/1
100 CAPSULE ORAL 2 TIMES DAILY
Qty: 20 CAPSULE | Refills: 0 | Status: SHIPPED | OUTPATIENT
Start: 2018-12-14 | End: 2019-05-08

## 2018-12-14 RX ORDER — ALBUTEROL SULFATE 90 UG/1
AEROSOL, METERED RESPIRATORY (INHALATION)
Qty: 1 INHALER | Refills: 1 | Status: SHIPPED | OUTPATIENT
Start: 2018-12-14 | End: 2022-03-16

## 2018-12-14 ASSESSMENT — ANXIETY QUESTIONNAIRES
5. BEING SO RESTLESS THAT IT IS HARD TO SIT STILL: NOT AT ALL
7. FEELING AFRAID AS IF SOMETHING AWFUL MIGHT HAPPEN: NOT AT ALL
3. WORRYING TOO MUCH ABOUT DIFFERENT THINGS: NOT AT ALL
1. FEELING NERVOUS, ANXIOUS, OR ON EDGE: NOT AT ALL
GAD7 TOTAL SCORE: 0
6. BECOMING EASILY ANNOYED OR IRRITABLE: NOT AT ALL
2. NOT BEING ABLE TO STOP OR CONTROL WORRYING: NOT AT ALL

## 2018-12-14 ASSESSMENT — PATIENT HEALTH QUESTIONNAIRE - PHQ9
SUM OF ALL RESPONSES TO PHQ QUESTIONS 1-9: 3
5. POOR APPETITE OR OVEREATING: NOT AT ALL

## 2018-12-14 NOTE — PROGRESS NOTES
SUBJECTIVE:   Ciro Monet is a 48 year old male who presents to clinic today for the following health issues:    ENT Symptoms             Symptoms: cc Present Absent Comment   Fever/Chills   x    Fatigue  x     Muscle Aches  x     Eye Irritation   x    Sneezing   x    Nasal Román/Drg x x     Sinus Pressure/Pain x x  pressure   Loss of smell   x    Dental pain   x    Sore Throat   x    Swollen Glands   x    Ear Pain/Fullness   x    Cough x x  Dry and productive   Wheeze  x     Chest Pain   x Chest tightness   Shortness of breath  x  A couple days ago only, took inhaler   Rash   x    Other   x      Symptom duration:  1 week   Symptom severity:  moderate   Treatments tried:  Nyquil at bedtime with some relief, albuterol inhaler   Contacts:  None     * he would like refill of inhaler as well as lamisil    He has been exposed to smoke at work  Inhaler is . Started using 2-3 days ago.      Problem list and histories reviewed & adjusted, as indicated.  Additional history: as documented    Patient Active Problem List   Diagnosis     Moderate depressed bipolar I disorder (H)     Esophageal reflux     CHARITY (Obstructive Sleep Apnea)-severe ()     HYPERLIPIDEMIA LDL GOAL <160     Morbid obesity (H)     Localized superficial swelling, mass, or lump     Renal cyst needs ct 6/15     Incarcerated hernia     Umbilical hernia, incarcerated     Schizoaffective disorder, chronic condition (H)     Past Surgical History:   Procedure Laterality Date     HC REPAIR EPIGASTRIC HERNIA,REDUC  07     HC TOOTH EXTRACTION W/FORCEP       HEMORRHOID INJECTION SCLEROSING SOLUTION       HERNIORRHAPHY UMBILICAL N/A 10/29/2016    Procedure: HERNIORRHAPHY UMBILICAL;  Surgeon: Lori Barron MD;  Location:  OR     [facility]  ??       Social History     Tobacco Use     Smoking status: Never Smoker     Smokeless tobacco: Never Used   Substance Use Topics     Alcohol use: No     Alcohol/week: 0.0 oz     Family History   Problem  Relation Age of Onset     Hypertension Father      Genitourinary Problems Father         kidney stones     C.A.D. Maternal Grandfather      Genitourinary Problems Brother         kidney stones     Genitourinary Problems Brother         kidney stones           Reviewed and updated as needed this visit by clinical staff  Tobacco  Allergies  Meds  Problems  Med Hx  Surg Hx  Fam Hx  Soc Hx        Reviewed and updated as needed this visit by Provider  Allergies  Meds  Problems         ROS:  Other than noted above, general, HEENT, respiratory, cardiac, MS, and gastrointestinal systems are negative.     OBJECTIVE:     /84   Pulse 95   Temp 99  F (37.2  C) (Tympanic)   Wt (!) 182 kg (401 lb 4.8 oz)   SpO2 96%   BMI 52.95 kg/m    Body mass index is 52.95 kg/m .  GENERAL: healthy, alert and no distress  HENT: ear canals and TM's normal, nose and mouth without ulcers or lesions  NECK: no adenopathy, no asymmetry, masses, or scars and thyroid normal to palpation  RESP: lungs clear to auscultation - no rales, rhonchi POSITIVE a few faint scattered wheezes  CV: regular rate and rhythm, normal S1 S2, no S3 or S4, no murmur, click or rub, no peripheral edema and peripheral pulses strong  ABDOMEN: soft, nontender, no hepatosplenomegaly, no masses and bowel sounds normal  MS: no gross musculoskeletal defects noted, no edema    Patient well appearing, breathing easily in clinic, speaking in full sentences easily, no signs of cyanosis or respiratory distress.     ASSESSMENT/PLAN:     ASSESSMENT/PLAN:      ICD-10-CM    1. Sinus pressure J34.89 fluticasone (FLONASE) 50 MCG/ACT nasal spray   2. Cough R05 albuterol (PROAIR HFA) 108 (90 Base) MCG/ACT inhaler     doxycycline monohydrate (MONODOX) 100 MG capsule   3. Dermatophytosis B35.9      Did give antibiotics as patient is traveling to [place] in a couple days, so he doesn't have to call, but he will only take if needed    Patient Instructions   Have plenty of rest  and fluids  Humidified air can be very helpful with cough  Use inhaler up to every 4-6 hours during this illness  Restart flonase  If not improving in 3-4 days start doxycycline antibiotics   Be seen if worsening symptoms or if not improving  Be seen urgently if worsening breathing     Cynthia Lake PA-C  [facility]

## 2018-12-14 NOTE — PATIENT INSTRUCTIONS
Have plenty of rest and fluids  Humidified air can be very helpful with cough  Use inhaler up to every 4-6 hours during this illness  Restart flonase  If not improving in 3-4 days start doxycycline antibiotics   Be seen if worsening symptoms or if not improving  Be seen urgently if worsening breathing

## 2018-12-15 ASSESSMENT — ANXIETY QUESTIONNAIRES: GAD7 TOTAL SCORE: 0

## 2019-05-08 ENCOUNTER — OFFICE VISIT (OUTPATIENT)
Dept: FAMILY MEDICINE | Facility: CLINIC | Age: 49
End: 2019-05-08
Payer: COMMERCIAL

## 2019-05-08 DIAGNOSIS — F25.9 SCHIZOAFFECTIVE DISORDER, CHRONIC CONDITION (H): ICD-10-CM

## 2019-05-08 DIAGNOSIS — N28.1 RENAL CYST: Primary | ICD-10-CM

## 2019-05-08 DIAGNOSIS — E66.01 MORBID OBESITY DUE TO EXCESS CALORIES (H): ICD-10-CM

## 2019-05-08 PROCEDURE — 99214 OFFICE O/P EST MOD 30 MIN: CPT | Performed by: FAMILY MEDICINE

## 2019-05-08 ASSESSMENT — MIFFLIN-ST. JEOR: SCORE: 2759.59

## 2019-05-08 ASSESSMENT — PAIN SCALES - GENERAL: PAINLEVEL: NO PAIN (0)

## 2019-05-08 NOTE — PROGRESS NOTES
SUBJECTIVE:                                                    Ciro Monet is a 48 year old male who presents to clinic today for the following health issues:    Chief Complaint   Patient presents with     Follow Up     diascuss MRI     **Patient says he had an MRI about 4 years ago through [place] and there was a tumor noted on his kidney. He is wanting to discuss this. Wondering if he should have a repeat MRI.    CT ABDOMEN/PELVIS WITHOUT CONTRAST June 17, 2014 3:52 AM      HISTORY: Abdominal pain.     TECHNIQUE: Volumetric helical sections were acquired from the lung  bases through the ischial tuberosities without IV contrast.  Coronal  and sagittal images were also reconstructed. Renal stone protocol was  performed.     COMPARISON: None.     FINDINGS: No urinary calculi or hydronephrosis. Large hyperdense  lesion in the upper pole of the left kidney measures 9.4 x 8.6 x 8.6  cm, and could represent a solid renal neoplasm, although a hemorrhagic  or proteinaceous renal cyst could also possibly have this appearance.     Colonic diverticulosis. Focal bowel wall thickening in the proximal  sigmoid colon and moderate surrounding fat stranding likely represents  acute diverticulitis. No associated fluid collection to suggest  diverticular abscess. No bowel obstruction. No free intraperitoneal  air. Trace free fluid in the pelvis is nonspecific, but likely related  to diverticulitis. The appendix is unremarkable.     Diffuse fatty infiltration of the liver. The gallbladder, spleen,  adrenal glands, and pancreas have unremarkable noncontrast  appearances. There is a small to moderate fat-containing paraumbilical  hernia. There are a few indeterminate pulmonary nodules at both lung  bases, with the largest in the right lower lobe laterally (series 2  image 2) measuring 0.7 cm. Mild degenerative changes in the lumbar  spine.     IMPRESSION  IMPRESSION:   1. Probable acute diverticulitis in the proximal sigmoid colon.  No  associated abscess.  2. Indeterminate hyperdense left renal lesion measures 9.4 cm. A solid  renal neoplasm could have this appearance, although a hemorrhagic or  proteinaceous renal cyst is also a differential consideration. Renal  MRI may be helpful for further characterization.  3. Small to moderate fat-containing paraumbilical hernia.  4. Diffuse fatty infiltration of the liver.  5. Few indeterminate pulmonary nodules at both lung bases, with the  largest measuring 0.7 cm. Neoplasm is not excluded. Followup  is  Recommended.    MR ABDOMEN MRCP WITHOUT AND WITH CONTRAST  6/25/2014 7:42 AM     CLINICAL INFORMATION:  Left kidney 9 cm mass.     TECHNIQUE: MRCP was ordered; MRCP protocol performed with inclusion of  the left kidney. Multisequence multiplanar MRI upper abdomen including  in- and out-of-phase, T1, T2 and pre- and postcontrast sequences with  10 mL IV Gadavist.     COMPARISON: 6/17/2014 CT abdomen and pelvis.     FINDINGS: A round 8.3 x 8.5 x 10.5 cm left renal lesion demonstrates  high T2 and low T1 signal intensity without demonstrable enhancement.  Given its hyperdense appearance on the 6/17/2014 CT, this is most  likely a complex or proteinaceous cyst.     The kidneys otherwise appear normal. Diffuse fatty infiltration of the  liver with minimal focal fatty sparing along the posterior lateral  segment left lobe of the liver. No focal liver lesions identified.  Tiny 0.4 cm gallbladder filling defect compatible with cholelithiasis.  Gallbladder otherwise appears normal. No evidence for  choledocholithiasis. No evidence for biliary duct dilatation. The  spleen, pancreas, and adrenal glands appear normal. No periaortic  adenopathy. Visualized bowel in the upper abdomen is unremarkable.     IMPRESSION  IMPRESSION:   1. Tiny gallstone, no evidence for choledocholithiasis or biliary duct  dilatation.  2. Large 10 cm left renal lesion does not demonstrate convincing  internal enhancement and is  compatible with a mildly complex or  proteinaceous cyst.  3. Mild diffuse fatty infiltration of the liver    CT ABDOMEN/PELVIS WITH CONTRAST  10/28/2016 7:33 PM     HISTORY: Umbilical swelling and pain. Rule out abscess/hernia.     TECHNIQUE: Scans obtained from the diaphragm through the pelvis with  IV contrast. Radiation dose for this scan was reduced using automated  exposure control, adjustment of the mA and/or kV according to patient  size, or iterative reconstruction technique.     CONTRAST GIVEN: 100 mL Isovue-370.     COMPARISON: 6/17/2014.     FINDINGS: Large ventral hernia containing a loop of unobstructed  sigmoid colon. Diverticula in the sigmoid colon. Extensive  inflammatory changes adjacent to the sigmoid colon in the region of  the hernia consistent with diverticulitis. No abscess. No evidence for  colonic or small bowel obstruction. Moderate prostatic enlargement.  Liver, spleen, and pancreas are normal. Large cyst again noted in the  left kidney. Both kidneys are otherwise normal. No abdominal or pelvic  adenopathy. Remainder of the scan is negative.                                                                      IMPRESSION:   1. Large periumbilical hernia containing a loop of sigmoid colon.  2. Diverticulitis involving the sigmoid colon with in the hernia. No  abscess.  3. Moderate prostatic enlargement.   4. Stable left renal cyst.     CHUCHO BOWIE MD   Ord    Ciro Monet is status post:  Open repair inc umb hernia by Dr Barron.  Surgery without complications.  Post-op course with wound infection treated with open wound cares and po antibiotics (still taking).  Incisions examined, no signs of acute infection, wound open granulating.  All of the patients questions were answered.  Standard post-op instructions and restrictions given.  Follow-up with Dr Barron on a PRN basis.    Problem list and histories reviewed & adjusted, as indicated.  Additional history:     Patient Active Problem  List   Diagnosis     Moderate depressed bipolar I disorder (H)     Esophageal reflux     CHARITY (Obstructive Sleep Apnea)-severe ()     HYPERLIPIDEMIA LDL GOAL <160     Morbid obesity (H)     Localized superficial swelling, mass, or lump     Renal cyst needs ct 6/15     Incarcerated hernia     Umbilical hernia, incarcerated     Schizoaffective disorder, chronic condition (H)     Past Surgical History:   Procedure Laterality Date     HC REPAIR EPIGASTRIC HERNIA,REDUC  11/23/07     HC TOOTH EXTRACTION W/FORCEP       HEMORRHOID INJECTION SCLEROSING SOLUTION       HERNIORRHAPHY UMBILICAL N/A 10/29/2016    Procedure: HERNIORRHAPHY UMBILICAL;  Surgeon: Lori Barron MD;  Location:  OR     [facility]  ?2005?       Social History     Tobacco Use     Smoking status: Never Smoker     Smokeless tobacco: Never Used   Substance Use Topics     Alcohol use: No     Alcohol/week: 0.0 oz     Family History   Problem Relation Age of Onset     Hypertension Father      Genitourinary Problems Father         kidney stones     C.A.D. Maternal Grandfather      Genitourinary Problems Brother         kidney stones     Genitourinary Problems Brother         kidney stones         Current Outpatient Medications   Medication Sig Dispense Refill     ABILIFY 15 MG tablet Take 1 tablet by mouth daily       INVEGA SUSTENNA 234 MG/1.5ML SUSP        metFORMIN (GLUCOPHAGE-XR) 500 MG 24 hr tablet Takes four tablets in am  3     order for DME Equipment being ordered: BIPAP  Patient Ciro Monet received a [employer] REMstar BiPap (60 Series) Bilevel. Pressures were set at 21/12 cm H2O.       albuterol (PROAIR HFA) 108 (90 Base) MCG/ACT inhaler 2 puffs at least TID and 2 puffs Q 4 hours prn. (Patient not taking: Reported on 5/8/2019) 1 Inhaler 1     fluticasone (FLONASE) 50 MCG/ACT nasal spray Spray 1-2 sprays into both nostrils daily (Patient not taking: Reported on 5/8/2019) 1 Bottle 11     OMEPRAZOLE        Allergies   Allergen  "Reactions     Adhesive Tape Rash     3M Plastic adhesive transpore tape per dad. Blisters and rash.     Pine Swelling and Difficulty breathing     Eyes swelling & difficultly breathing.       ROS:  Constitutional, HEENT, cardiovascular, pulmonary, gi and gu systems are negative, except as otherwise noted.    OBJECTIVE:                                                    /82   Pulse 97   Temp 98.8  F (37.1  C) (Tympanic)   Resp 26   Ht 1.854 m (6' 1\")   Wt (!) 183.6 kg (404 lb 11.2 oz)   SpO2 94%   BMI 53.39 kg/m   Body mass index is 53.39 kg/m .   GENERAL: healthy, alert, well nourished, well hydrated, no distress  HENT: ear canals- normal; TMs- normal; Nose- normal; Mouth- no ulcers, no lesions  NECK: no tenderness, no adenopathy, no asymmetry, no masses, no stiffness; thyroid- normal to palpation  RESP: lungs clear to auscultation - no rales, no rhonchi, no wheezes  CV: regular rates and rhythm, normal S1 S2, no S3 or S4 and no murmur, no click or rub -  ABDOMEN: soft, no tenderness, no  hepatosplenomegaly, no masses, normal bowel sounds       ASSESSMENT/PLAN:                                                      (N28.1) Renal cyst needs ct 6/15  (primary encounter diagnosis)   reviewed h/u o this lesiopn  Appears stable  Reasonable to get unit(s)/s if not significantly changed woould not do f/u scans.  Plan: US Renal Complete      (F25.8) Schizoaffective disorder, chronic condition (H)   stable  He has a stable job.  Did have 2 bizarre words  seemingly spontaneously interjected.  ,\" voice activated\", \"completely\".    (E66.01) Morbid obesity due to excess calories (H)   discussed stopping pop       reports that he has never smoked. He has never used smokeless tobacco.      Weight management plan: Discussed healthy diet and exercise guidelines      [facility]    "

## 2019-05-13 VITALS
TEMPERATURE: 98.8 F | DIASTOLIC BLOOD PRESSURE: 82 MMHG | WEIGHT: 315 LBS | OXYGEN SATURATION: 94 % | HEART RATE: 97 BPM | HEIGHT: 73 IN | SYSTOLIC BLOOD PRESSURE: 132 MMHG | RESPIRATION RATE: 26 BRPM | BODY MASS INDEX: 41.75 KG/M2

## 2019-05-14 ENCOUNTER — HOSPITAL ENCOUNTER (OUTPATIENT)
Dept: ULTRASOUND IMAGING | Facility: CLINIC | Age: 49
Discharge: HOME | End: 2019-05-14
Attending: FAMILY MEDICINE | Admitting: FAMILY MEDICINE
Payer: COMMERCIAL

## 2019-05-14 DIAGNOSIS — N28.1 RENAL CYST: ICD-10-CM

## 2019-05-14 PROCEDURE — 76770 US EXAM ABDO BACK WALL COMP: CPT

## 2019-05-14 PROCEDURE — 76775 US EXAM ABDO BACK WALL LIM: CPT

## 2019-05-15 DIAGNOSIS — N28.1 RENAL CYST: Primary | ICD-10-CM

## 2019-05-16 ENCOUNTER — TELEPHONE (OUTPATIENT)
Dept: FAMILY MEDICINE | Facility: CLINIC | Age: 49
End: 2019-05-16

## 2019-05-16 NOTE — TELEPHONE ENCOUNTER
US results were given to Ciro with Dr. Morgan's comments to return in 1 year.    ..Leighann Chawla

## 2019-06-14 DIAGNOSIS — G47.33 OBSTRUCTIVE SLEEP APNEA (ADULT) (PEDIATRIC): Primary | ICD-10-CM

## 2019-06-17 NOTE — PROGRESS NOTES
LAst clinic visit was in 2015. Recommend follow up at his earliest convenience this year.   Lisa Licona PA-C

## 2019-07-05 DIAGNOSIS — F25.1 SCHIZOAFFECTIVE DISORDER, DEPRESSIVE TYPE (H): Primary | ICD-10-CM

## 2019-07-05 LAB
CHOLEST SERPL-MCNC: 148 MG/DL
HDLC SERPL-MCNC: 58 MG/DL
LDLC SERPL CALC-MCNC: 78 MG/DL
NONHDLC SERPL-MCNC: 90 MG/DL
TRIGL SERPL-MCNC: 59 MG/DL

## 2019-07-05 PROCEDURE — 80061 LIPID PANEL: CPT | Performed by: PSYCHIATRY & NEUROLOGY

## 2019-07-05 PROCEDURE — 36415 COLL VENOUS BLD VENIPUNCTURE: CPT | Performed by: PSYCHIATRY & NEUROLOGY

## 2019-08-22 ENCOUNTER — TRANSFERRED RECORDS (OUTPATIENT)
Dept: HEALTH INFORMATION MANAGEMENT | Facility: CLINIC | Age: 49
End: 2019-08-22

## 2019-09-26 ENCOUNTER — OFFICE VISIT (OUTPATIENT)
Dept: FAMILY MEDICINE | Facility: CLINIC | Age: 49
End: 2019-09-26
Payer: COMMERCIAL

## 2019-09-26 VITALS
WEIGHT: 315 LBS | DIASTOLIC BLOOD PRESSURE: 84 MMHG | SYSTOLIC BLOOD PRESSURE: 112 MMHG | BODY MASS INDEX: 53.33 KG/M2 | RESPIRATION RATE: 26 BRPM | HEART RATE: 92 BPM | TEMPERATURE: 99 F

## 2019-09-26 DIAGNOSIS — I83.899 COMPLICATED VARICOSE VEINS: Primary | ICD-10-CM

## 2019-09-26 PROCEDURE — 99213 OFFICE O/P EST LOW 20 MIN: CPT | Performed by: FAMILY MEDICINE

## 2019-09-26 ASSESSMENT — PAIN SCALES - GENERAL: PAINLEVEL: MODERATE PAIN (4)

## 2019-09-26 NOTE — PROGRESS NOTES
SUBJECTIVE:                                                    Ciro Monet is a 49 year old male who presents to clinic today for the following health issues:    Concern - swelling  Onset: has been ongoing for a few years    Description:   He has constant swelling in his left leg. He says there is bruising from all the water. His orthopedic doctor thinks the valves in his legs are not working.      Intensity: moderate    Progression of Symptoms:  same and constant    Accompanying Signs & Symptoms:  He is also having some swelling in his right knee    Previous history of similar problem:   none    Precipitating factors:   Worsened by: When he is on his feet a lot    Alleviating factors:  Improved by: Rest and elevation    Therapies Tried and outcome: Rest and elevation seem to help with the swelling.     Problem list and histories reviewed & adjusted, as indicated.  Additional history:     Patient Active Problem List   Diagnosis     Moderate depressed bipolar I disorder (H)     Esophageal reflux     CHARITY (Obstructive Sleep Apnea)-severe ()     HYPERLIPIDEMIA LDL GOAL <160     Morbid obesity (H)     Localized superficial swelling, mass, or lump     Renal cyst needs ct 6/15     Incarcerated hernia     Umbilical hernia, incarcerated     Schizoaffective disorder, chronic condition (H)     Past Surgical History:   Procedure Laterality Date     HC REPAIR EPIGASTRIC HERNIA,REDUC  11/23/07     HC TOOTH EXTRACTION W/FORCEP       HEMORRHOID INJECTION SCLEROSING SOLUTION       HERNIORRHAPHY UMBILICAL N/A 10/29/2016    Procedure: HERNIORRHAPHY UMBILICAL;  Surgeon: Lori Barron MD;  Location: [facility]  ?2005?       Social History     Tobacco Use     Smoking status: Never Smoker     Smokeless tobacco: Never Used   Substance Use Topics     Alcohol use: No     Alcohol/week: 0.0 standard drinks     Family History   Problem Relation Age of Onset     Hypertension Father      Genitourinary Problems Father          kidney stones     C.A.D. Maternal Grandfather      Genitourinary Problems Brother         kidney stones     Genitourinary Problems Brother         kidney stones         Current Outpatient Medications   Medication Sig Dispense Refill     ABILIFY 15 MG tablet Take 1 tablet by mouth daily       INVEGA SUSTENNA 234 MG/1.5ML SUSP        metFORMIN (GLUCOPHAGE-XR) 500 MG 24 hr tablet Takes four tablets in am  3     order for DME Equipment being ordered: BIPAP  Patient Ciro Monet received a [facility] [employer] REMstar BiPap (60 Series) Bilevel. Pressures were set at 21/12 cm H2O.       albuterol (PROAIR HFA) 108 (90 Base) MCG/ACT inhaler 2 puffs at least TID and 2 puffs Q 4 hours prn. (Patient not taking: Reported on 9/26/2019) 1 Inhaler 1     fluticasone (FLONASE) 50 MCG/ACT nasal spray Spray 1-2 sprays into both nostrils daily (Patient not taking: Reported on 9/26/2019) 1 Bottle 11     OMEPRAZOLE        Allergies   Allergen Reactions     Adhesive Tape Rash     3M Plastic adhesive transpore tape per dad. Blisters and rash.     Pine Swelling and Difficulty breathing     Eyes swelling & difficultly breathing.       ROS:  Constitutional, HEENT, cardiovascular, pulmonary, gi and gu systems are negative, except as otherwise noted.    OBJECTIVE:                                                    /84 (BP Location: Right arm, Patient Position: Sitting, Cuff Size: Adult Large)   Pulse 92   Temp 99  F (37.2  C) (Tympanic)   Resp 26   Wt (!) 183.3 kg (404 lb 3.2 oz)   BMI 53.33 kg/m   Body mass index is 53.33 kg/m .   GENERAL: healthy, alert, well nourished, well hydrated, no distress  HENT: ear canals- normal; TMs- normal; Nose- normal; Mouth- no ulcers, no lesions  NECK: no tenderness, no adenopathy, no asymmetry, no masses, no stiffness; thyroid- normal to palpation  RESP: lungs clear to auscultation - no rales, no rhonchi, no wheezes  CV: regular rates and rhythm, normal S1 S2, no S3 or S4 and no murmur, no click or  rub -  ABDOMEN: soft, no tenderness, no  hepatosplenomegaly, no masses, normal bowel sounds  Legs: has large venous varicosities raysa wosre ove ankle     ASSESSMENT/PLAN:                                                      (I83.899) Complicated varicose veins  (primary encounter diagnosis)  Plan: VASCULAR SURGERY REFERRAL, CANCELED: GENERAL         SURG ADULT REFERRAL           reports that he has never smoked. He has never used smokeless tobacco.      Weight management plan: Discussed healthy diet and exercise guidelines      [facility]

## 2019-09-27 ENCOUNTER — TELEPHONE (OUTPATIENT)
Dept: OTHER | Facility: CLINIC | Age: 49
End: 2019-09-27

## 2019-09-27 NOTE — TELEPHONE ENCOUNTER
"Referral received via EPIC \"Work Que\", per  guidelines referral forwarded to [facility].     Sharon Vieira BSN, RN       "

## 2019-10-03 ENCOUNTER — HEALTH MAINTENANCE LETTER (OUTPATIENT)
Age: 49
End: 2019-10-03

## 2019-11-15 ENCOUNTER — TELEPHONE (OUTPATIENT)
Dept: OTHER | Facility: CLINIC | Age: 49
End: 2019-11-15

## 2019-11-15 NOTE — TELEPHONE ENCOUNTER
Patient called [facility] in the hopes to reschedule a new vascular medicine consult appointment he had on 11/14/2019 with Dr. Gleason that he missed.     Scheduled him for 1/16/2020 with Dr. Gleason at [facility].     Routing to [facility]  Appointment

## 2020-01-16 ENCOUNTER — OFFICE VISIT (OUTPATIENT)
Dept: VASCULAR SURGERY | Facility: CLINIC | Age: 50
End: 2020-01-16
Payer: COMMERCIAL

## 2020-01-16 VITALS
HEART RATE: 83 BPM | TEMPERATURE: 97.7 F | SYSTOLIC BLOOD PRESSURE: 124 MMHG | DIASTOLIC BLOOD PRESSURE: 80 MMHG | RESPIRATION RATE: 18 BRPM

## 2020-01-16 DIAGNOSIS — I87.2 VENOUS (PERIPHERAL) INSUFFICIENCY: Primary | ICD-10-CM

## 2020-01-16 PROCEDURE — 99204 OFFICE O/P NEW MOD 45 MIN: CPT | Performed by: INTERNAL MEDICINE
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
## 2020-01-16 NOTE — PROGRESS NOTES
Vascular Medicine Progress Note     Ciro Monet is a 49 year old male who is here for venous insufficiency    Interval History   Patient has bilateral lower extremity varicose veins, signs and symptoms of venous insufficiency, leg swelling, leg discomfort, restless leg, itching, symptoms are worse at the end of the day and much better in the early morning  Patient works in a job that requires him to be standing for 8 to 10 hours a day  Patient had no history of DVT, no history of venous interventions in the past, patient has no family history of varicose veins or venous insufficiency or DVT  Patient denies any history of claudication, rest pain or nocturnal pain  Patient denies any history of spontaneous bleeding or spontaneous ulceration    Physical Exam   Temp: 97.7  F (36.5  C) Temp src: Tympanic BP: 124/80 Pulse: 83   Resp: 18        There were no vitals filed for this visit.  Vital Signs with Ranges  Temp:  [97.7  F (36.5  C)] 97.7  F (36.5  C)  Pulse:  [82-83] 83  Resp:  [18] 18  BP: (124-126)/(80-84) 124/80  [unfilled]    Constitutional: awake, alert, cooperative, no apparent distress, and appears stated age  Eyes: Lids and lashes normal, pupils equal, round and reactive to light, extra ocular muscles intact, sclera clear, conjunctiva normal  ENT: normocepalic, without obvious abnormality, oropharynx pink and moist  Hematologic / Lymphatic: no lymphadenopathy  Respiratory: No increased work of breathing, good air exchange, clear to auscultation bilaterally, no crackles or wheezing  Cardiovascular: regular rate and rhythm, normal S1 and S2 and no murmur noted  GI: Normal bowel sounds, soft, non-distended, non-tender  Skin: no redness, warmth, or swelling, no rashes and no lesions  Musculoskeletal: There is no redness, warmth, or swelling of the joints.  Full range of motion noted.  Motor strength is 5 out of 5 all extremities bilaterally.  Tone is normal.  Neurologic: Awake, alert, oriented to name,  place and time.  Cranial nerves II-XII are grossly intact.  Motor is 5 out of 5 bilaterally.    Neuropsychiatric:  Normal affect, memory, insight.  Pulses: Palpable veins, nontender, varicose veins, spider veins, telangiectasia, C 3 with edema +1  . No carotid bruits appreciated.     Medications         Data   No results found for this or any previous visit (from the past 24 hour(s)).    Assessment & Plan   No diagnosis found.      Summary: Venous insufficiency bilateral lower extremities    Venous ultrasound, insufficiency study bilateral lower extremities  Follow-up with the patient once test results are available    Cameron Gleason MD

## 2020-01-16 NOTE — LETTER
[facility]  [address] [address]  Phone: [phone]  Fax: [phone]    January 16, 2020        Ciro Monet  [address] [place] [address]   [facility] [id number]          To whom it may concern:    RE: Ciro Borgesz    Patient was seen and treated today at our clinic and missed work.    Please contact me for questions or concerns.      Sincerely,        Cameron Gleason MD

## 2020-02-13 ENCOUNTER — OFFICE VISIT (OUTPATIENT)
Dept: VASCULAR SURGERY | Facility: CLINIC | Age: 50
End: 2020-02-13
Attending: INTERNAL MEDICINE
Payer: COMMERCIAL

## 2020-02-13 ENCOUNTER — HOSPITAL ENCOUNTER (OUTPATIENT)
Dept: ULTRASOUND IMAGING | Facility: CLINIC | Age: 50
Discharge: HOME | End: 2020-02-13
Attending: INTERNAL MEDICINE | Admitting: INTERNAL MEDICINE
Payer: COMMERCIAL

## 2020-02-13 VITALS
SYSTOLIC BLOOD PRESSURE: 127 MMHG | HEART RATE: 97 BPM | RESPIRATION RATE: 28 BRPM | WEIGHT: 315 LBS | TEMPERATURE: 98.9 F | DIASTOLIC BLOOD PRESSURE: 86 MMHG | BODY MASS INDEX: 53.33 KG/M2

## 2020-02-13 DIAGNOSIS — I87.2 VENOUS (PERIPHERAL) INSUFFICIENCY: ICD-10-CM

## 2020-02-13 PROCEDURE — 93970 EXTREMITY STUDY: CPT

## 2020-02-13 PROCEDURE — 99213 OFFICE O/P EST LOW 20 MIN: CPT | Performed by: INTERNAL MEDICINE

## 2020-02-13 NOTE — NURSING NOTE
"Initial /86 (BP Location: Right arm, Patient Position: Sitting, Cuff Size: Adult Large)   Pulse 97   Temp 98.9  F (37.2  C) (Tympanic)   Resp 28   Wt (!) 183.3 kg (404 lb 3.2 oz)   BMI 53.33 kg/m   Estimated body mass index is 53.33 kg/m  as calculated from the following:    Height as of 5/8/19: 1.854 m (6' 1\").    Weight as of this encounter: 183.3 kg (404 lb 3.2 oz).     Mayte DEGROOTA      "

## 2020-02-13 NOTE — PROGRESS NOTES
Vascular Medicine Progress Note     Ciro Monet is a 49 year old male who is here for follow-up on venous insufficiency study done today    Interval History   Venous insufficiency study done today showed no evidence of DVT bilaterally, no evidence of deep system reflux bilaterally, showed evidence of reflux on the left side GSV with prominent and reflux varicose veins on the right lower extremity, right lower extremity asymptomatic yet there is no ulceration or spontaneous    Patient has been started wearing compression stockings, 20 to 30mmHg bilateral thigh-high, tolerating the compression    Physical Exam   Temp: 98.9  F (37.2  C) Temp src: Tympanic BP: 127/86 Pulse: 97   Resp: 28        Vitals:    02/13/20 1532   Weight: (!) 183.3 kg (404 lb 3.2 oz)     Vital Signs with Ranges  Temp:  [98.9  F (37.2  C)] 98.9  F (37.2  C)  Pulse:  [97] 97  Resp:  [28] 28  BP: (127)/(86) 127/86  [unfilled]    Constitutional: awake, alert, cooperative, no apparent distress, and appears stated age  Eyes: Lids and lashes normal, pupils equal, round and reactive to light, extra ocular muscles intact, sclera clear, conjunctiva normal  ENT: normocepalic, without obvious abnormality, oropharynx pink and moist  Hematologic / Lymphatic: no lymphadenopathy  Respiratory: No increased work of breathing, good air exchange, clear to auscultation bilaterally, no crackles or wheezing  Cardiovascular: regular rate and rhythm, normal S1 and S2 and no murmur noted  GI: Normal bowel sounds, soft, non-distended, non-tender  Skin: no redness, warmth, or swelling, no rashes and no lesions  Musculoskeletal: There is no redness, warmth, or swelling of the joints.  Full range of motion noted.  Motor strength is 5 out of 5 all extremities bilaterally.  Tone is normal.  Neurologic: Awake, alert, oriented to name, place and time.  Cranial nerves II-XII are grossly intact.  Motor is 5 out of 5 bilaterally.    Neuropsychiatric:  Normal affect,  memory, insight.  Pulses: Palpable bilateral  . No carotid bruits appreciated.     Medications         Data   No results found for this or any previous visit (from the past 24 hour(s)).    Assessment & Plan   (I87.2) Venous (peripheral) insufficiency  Comment: With no evidence of bilaterally mainly the left GSV reflux with right symptomatic varicose veins and refluxing  Plan: Compression treatment for 3 months 10 to 20mmHg bilateral thigh-high  Follow-up in 3 months        Cameron Gleason MD

## 2020-03-25 ENCOUNTER — NURSE TRIAGE (OUTPATIENT)
Dept: NURSING | Facility: CLINIC | Age: 50
End: 2020-03-25

## 2020-03-25 NOTE — TELEPHONE ENCOUNTER
"Patient states he has had a bad cold for the past two weeks. No fever. He has a productive cough with some thick, white to yellow sputum  He does use a cpap machine but only has trouble breathing when he first wakes up and feels more congested.  Patient wanted to ask if his symptoms could be covid19 and if he should be tested. No fever so he will not be asking to be tested at this time.   Patient will continue over the counter managemenet of his cold stay at home for at least 7 more days.     Additional Information    Cough with cold symptoms (e.g., runny nose, postnasal drip, throat clearing)    Answer Assessment - Initial Assessment Questions  1. ONSET: \"When did the cough begin?\"       2 weeks  2. SEVERITY: \"How bad is the cough today?\"       Getting better  3. RESPIRATORY DISTRESS: \"Describe your breathing.\"       Congested overnight  4. FEVER: \"Do you have a fever?\" If so, ask: \"What is your temperature, how was it measured, and when did it start?\"      no  5. SPUTUM: \"Describe the color of your sputum\" (clear, white, yellow, green)      Yes - thick, yellow,white  6. HEMOPTYSIS: \"Are you coughing up any blood?\" If so ask: \"How much?\" (flecks, streaks, tablespoons, etc.)      no  7. CARDIAC HISTORY: \"Do you have any history of heart disease?\" (e.g., heart attack, congestive heart failure)       no  8. LUNG HISTORY: \"Do you have any history of lung disease?\"  (e.g., pulmonary embolus, asthma, emphysema)      Cpap,   9. PE RISK FACTORS: \"Do you have a history of blood clots?\" (or: recent major surgery, recent prolonged travel, bedridden )      no  10. OTHER SYMPTOMS: \"Do you have any other symptoms?\" (e.g., runny nose, wheezing, chest pain)        Runny nose, cough, tough to breath when stuffed up  11. PREGNANCY: \"Is there any chance you are pregnant?\" \"When was your last menstrual period?\"        no  12. TRAVEL: \"Have you traveled out of the country in the last month?\" (e.g., travel history, exposures)        " no    Protocols used: COUGH - ACUTE [id number]    Kanchan Perez, RN on 3/25/2020 at 3:31 PM

## 2020-03-25 NOTE — TELEPHONE ENCOUNTER
"Father calling:  \"He was sent home from work with 4 other people due to having cold like symptoms\".    Father was trying to arrange an [facility] visit for son, but said he kept getting hung up up.  I inquired if Ciro was able to come to phone to speak to me directly.  Father was not with son.  I explained to father that the best thing would be for me to speak to Ciro directly for proper assessment, and then we could determine what the best option is for Ciro after assessment.    Father asked me to try and call him now.  I called number in Epic and there is note okay to leave detailed message.  Left message for Ciro to call [facility], press option to speak to nurse and that we are available 24/7 and we can assess his symptoms and make proper recommendations at that time.  Call placed at 2:47 pm.    Additional Information    Negative: Caller is angry or rude (e.g., hangs up, verbally abusive, yelling)    Negative: Caller hangs up    Negative: Caller has already spoken with the PCP and has no further questions.    Negative: Caller has already spoken with another triager and has no further questions.    Negative: Caller has already spoken with another triager or PCP AND has further questions AND triager able to answer questions.    Negative: Busy signal.  First attempt to contact caller.  Follow-up call scheduled within 15 minutes.    Negative: No answer.  First attempt to contact caller.  Follow-up call scheduled within 15 minutes.    Message left on identified voice mail    Protocols used: NO CONTACT OR DUPLICATE CONTACT CALL-АЛЕКСАНДР Mckenzie RN  [facility]      "

## 2020-05-18 ENCOUNTER — VIRTUAL VISIT (OUTPATIENT)
Dept: OTHER | Facility: CLINIC | Age: 50
End: 2020-05-18
Attending: INTERNAL MEDICINE
Payer: COMMERCIAL

## 2020-05-18 VITALS — BODY MASS INDEX: 52.77 KG/M2 | WEIGHT: 315 LBS

## 2020-05-18 DIAGNOSIS — I87.2 VENOUS (PERIPHERAL) INSUFFICIENCY: ICD-10-CM

## 2020-05-18 PROCEDURE — 99212 OFFICE O/P EST SF 10 MIN: CPT | Mod: TEL | Performed by: INTERNAL MEDICINE

## 2020-05-18 NOTE — PROGRESS NOTES
"Ciro Monet is a 49 year old male who is being evaluated via a billable telephone visit.      The patient has been notified of following:     \"This telephone visit will be conducted via a call between you and your physician/provider. We have found that certain health care needs can be provided without the need for a physical exam.  This service lets us provide the care you need with a short phone conversation.  If a prescription is necessary we can send it directly to your pharmacy.  If lab work is needed we can place an order for that and you can then stop by our lab to have the test done at a later time.    Telephone visits are billed at different rates depending on your insurance coverage. During this emergency period, for some insurers they may be billed the same as an in-person visit.  Please reach out to your insurance provider with any questions.    If during the course of the call the physician/provider feels a telephone visit is not appropriate, you will not be charged for this service.\"    Patient has given verbal consent for Telephone visit?  Yes     What phone number would you like to be contacted at? Home phone number [phone]    How would you like to obtain your AVS? Mailed   "

## 2020-05-19 NOTE — PROGRESS NOTES
Vascular Medicine Progress Note     Ciro Monet is a 49 year old male who was interviewed over the phone    Phone call lasted for 10 minutes    Interval History   Patient leg swelling is a little bit better yet he still has the swelling    Patient will probably need ambulatory home pump    Physical Exam                      Vitals:    05/18/20 1528   Weight: (!) 181.4 kg (400 lb)     Vital Signs with Ranges     [unfilled]    Medications         Data   No results found for this or any previous visit (from the past 24 hour(s)).    Assessment & Plan   (I87.2) Venous (peripheral) insufficiency  Comment: Continue to wear compression stockings  Patient will need ambulatory home pump for secondary lymphedema  Plan: Compression stockings                Cameron Gleason MD

## 2020-05-19 NOTE — NURSING NOTE
Referral for lymphedema home pump faxed to [facility].    Sharon CASHN, RN    [facility]  Office: [phone]  Fax: [phone]

## 2020-08-05 ENCOUNTER — VIRTUAL VISIT (OUTPATIENT)
Dept: SLEEP MEDICINE | Facility: CLINIC | Age: 50
End: 2020-08-05
Payer: COMMERCIAL

## 2020-08-05 VITALS — BODY MASS INDEX: 41.75 KG/M2 | WEIGHT: 315 LBS | HEIGHT: 73 IN

## 2020-08-05 DIAGNOSIS — G47.33 OSA (OBSTRUCTIVE SLEEP APNEA): Primary | ICD-10-CM

## 2020-08-05 DIAGNOSIS — E66.01 MORBID OBESITY (H): ICD-10-CM

## 2020-08-05 PROCEDURE — 99203 OFFICE O/P NEW LOW 30 MIN: CPT | Mod: TEL | Performed by: PHYSICIAN ASSISTANT

## 2020-08-05 ASSESSMENT — MIFFLIN-ST. JEOR: SCORE: 2728.27

## 2020-08-05 NOTE — PATIENT INSTRUCTIONS
Your BMI is Body mass index is 52.77 kg/m .  Weight management is a personal decision.  If you are interested in exploring weight loss strategies, the following discussion covers the approaches that may be successful. Body mass index (BMI) is one way to tell whether you are at a healthy weight, overweight, or obese. It measures your weight in relation to your height.  A BMI of 18.5 to 24.9 is in the healthy range. A person with a BMI of 25 to 29.9 is considered overweight, and someone with a BMI of 30 or greater is considered obese. More than two-thirds of American adults are considered overweight or obese.  Being overweight or obese increases the risk for further weight gain. Excess weight may lead to heart disease and diabetes.  Creating and following plans for healthy eating and physical activity may help you improve your health.  Weight control is part of healthy lifestyle and includes exercise, emotional health, and healthy eating habits. Careful eating habits lifelong are the mainstay of weight control. Though there are significant health benefits from weight loss, long-term weight loss with diet alone may be very difficult to achieve- studies show long-term success with dietary management in less than 10% of people. Attaining a healthy weight may be especially difficult to achieve in those with severe obesity. In some cases, medications, devices and surgical management might be considered.  What can you do?  If you are overweight or obese and are interested in methods for weight loss, you should discuss this with your provider.     Consider reducing daily calorie intake by 500 calories.     Keep a food journal.     Avoiding skipping meals, consider cutting portions instead.    Diet combined with exercise helps maintain muscle while optimizing fat loss. Strength training is particularly important for building and maintaining muscle mass. Exercise helps reduce stress, increase energy, and improves fitness.  Increasing exercise without diet control, however, may not burn enough calories to loose weight.       Start walking three days a week 10-20 minutes at a time    Work towards walking thirty minutes five days a week     Eventually, increase the speed of your walking for 1-2 minutes at time    In addition, we recommend that you review healthy lifestyles and methods for weight loss available through the National Institutes of Health patient information sites:  http://win.niddk.nih.gov/publications/index.htm    And look into health and wellness programs that may be available through your health insurance provider, employer, local community center, or isai club.    Weight management plan: Patient was referred to their PCP to discuss a diet and exercise plan.

## 2020-08-05 NOTE — PROGRESS NOTES
"Ciro Monet is a 50 year old male who is being evaluated via a billable telephone visit.      The patient has been notified of following:     \"This telephone visit will be conducted via a call between you and your physician/provider. We have found that certain health care needs can be provided without the need for a physical exam.  This service lets us provide the care you need with a short phone conversation.  If a prescription is necessary we can send it directly to your pharmacy.  If lab work is needed we can place an order for that and you can then stop by our lab to have the test done at a later time.    Telephone visits are billed at different rates depending on your insurance coverage. During this emergency period, for some insurers they may be billed the same as an in-person visit.  Please reach out to your insurance provider with any questions.    If during the course of the call the physician/provider feels a telephone visit is not appropriate, you will not be charged for this service.\"    Patient has given verbal consent for Telephone visit?  Yes    What phone number would you like to be contacted at? [phone]    How would you like to obtain your AVS? Mail a copy    Phone call start time: 3:34PM      [facility]   Outpatient Sleep Medicine Consultation  August 5, 2020      Name: Ciro Monet MRN# [id number]   Age: 50 year old YOB: 1970     Date of Consultation: August 5, 2020  Consultation is requested by: No referring provider defined for this encounter. No ref. provider found  Primary care provider: Keegan Morgan       Chief Complaint / Reason for Sleep Consult:     PAP supplies         History of Present Illness:     Ciro Monet is a 50 year old male who presents to the clinic to re-establish care for his severe obstructive sleep apnea. Other past medical history significant for T2DM, venous insufficiency, bipolar 1 disorder, schizoaffective disorder, GERD, " "and HLD.     Reviewed previous sleep studies. Originally diagnosed at [facility] via split-night PSG on 2/4/2010 (317lbs, BMI 41.8) with findings significant for .8, vanessa desat 77%. CPAP [id number] found to be optimal at that time and patient started therapy. Titration study completed at [facility] on 6/28/2015 (360lbs, BMI 47.5) following 40lb weight gain to determine optimum settings -  no optimal pressure found, but bilevel 21/12 cmH2O appeared most effective and was started. Has been treated with BiPAP 21/03nsP1X ever since.     Returns to clinic today to reestablish care so that he can get a prescription for new PAP mask/supplies.  No new sleep concerns or questions to discuss today.  States \"this is the best I have been sleeping in a long time\".  Sleep reportedly significantly improved after he started seeing a chiropractor and got his shoulder/clavicles \"in better place\" and after purchasing a new mattress. Reports history of insomnia symptoms but this has resolved - \"I am sleeping real good\".     Patient is happy with his BiPAP.  Reports nightly use.  Patient is using a full facemask.  Mask is comfortable.  No rashes or irritation from the mask.  Denies any significant mask leak.  Ciro does not snore or have any gasp arousals when he uses his PAP.  Pressures are comfortable, does not think they need any adjusting. Unfortunately, patient is not on modem and we were unable to download his PAP compliance/download data for me to review today.    Estimates roughly 6-7 hours of sleep per night, reportedly uses PAP for entire sleep duration.  Goes to bed between 8-9:00 PM.  Falls asleep within 20 to 30 minutes.  Awakens between 3:30-4:00AM for work. Denies any nighttime awakenings. Denies any planned naps during the week. Very rare inadvertent dozing, \"once or twice per year\".     Patient denies any abnormal movements or behaviors in their sleep. No dream enactment behavior. " No somniloquy.  No somnambulism.  No bruxism. No restless legs syndrome symptoms.     SCALES       INSOMNIA:  Insomnia severity score: 10/28       SLEEPINESS: [place] sleepiness scale: 8 [normal < 11]          Medications:     Current Outpatient Medications   Medication Sig     ABILIFY 15 MG tablet Take 1 tablet by mouth daily     albuterol (PROAIR HFA) 108 (90 Base) MCG/ACT inhaler 2 puffs at least TID and 2 puffs Q 4 hours prn.     INVEGA SUSTENNA 234 MG/1.5ML SUSP      metFORMIN (GLUCOPHAGE-XR) 500 MG 24 hr tablet Takes four tablets in am     OMEPRAZOLE daily      order for DME Equipment being ordered: BIPAP  Patient Ciro Monet received a GlySure REMstar BiPap (60 Series) Bilevel. Pressures were set at 21/12 cm H2O.     No current facility-administered medications for this visit.              Allergies:     Allergies   Allergen Reactions     Adhesive Tape Rash     3M Plastic adhesive transpore tape per dad. Blisters and rash.     Pine Swelling and Difficulty breathing     Eyes swelling & difficultly breathing.            Past Medical History:     Past Medical History:   Diagnosis Date     Diabetes (H)      Hiatal hernia      Morbid obesity with BMI of 45.0-49.9, adult (H)      CHARITY (obstructive sleep apnea) 4/2010      CPAP 15             Past Surgical History:    Previous upper airway surgery: denies    Past Surgical History:   Procedure Laterality Date     HC REPAIR EPIGASTRIC HERNIA,REDUC  11/23/07     HC TOOTH EXTRACTION W/FORCEP       HEMORRHOID INJECTION SCLEROSING SOLUTION       HERNIORRHAPHY UMBILICAL N/A 10/29/2016    Procedure: HERNIORRHAPHY UMBILICAL;  Surgeon: Lori Barron MD;  Location: [facility]  ?2005?            Social History:     Social History     Tobacco Use     Smoking status: Never Smoker     Smokeless tobacco: Never Used   Substance Use Topics     Alcohol use: No     Alcohol/week: 0.0 standard drinks     Chemical History:  Alcohol use: Denies current use    "    Tobacco use: Denies current use  Illicit substances: Denies current use   Caffeine intake: Drinks 2 caffeine beverages a day - coffee or soda.     Occupation: Works for [employer] -  for [employer]. No overnight shifts.          Family History:     Family History   Problem Relation Age of Onset     Hypertension Father      Genitourinary Problems Father         kidney stones     C.A.D. Maternal Grandfather      Genitourinary Problems Brother         kidney stones     Genitourinary Problems Brother         kidney stones      Sleep Family Hx: Father and uncles with CHARITY treated with PAP therapy. Otherwise denies other known sleep disorders in the family.          Physical Examination:   Ht 1.854 m (6' 1\")   Wt (!) 181.4 kg (400 lb)   BMI 52.77 kg/m    General: Cooperative. In no apparent distress.  Pulmonary:  Able to speak easily in full sentences. No cough or wheeze.   Neurologic: Alert, oriented x3.   Psychiatric: Mood euthymic. Somewhat blunted affect.         Data: All pertinent previous laboratory data reviewed     No results found for: PH, PHARTERIAL, PO2, [id number], SAT, PCO2, HCO3, BASEEXCESS, SELMA, BEB  Lab Results   Component Value Date    TSH 1.61 10/06/2012    TSH 1.58 06/30/2011     Lab Results   Component Value Date    GLC 83 06/26/2017     (H) 11/01/2016     Lab Results   Component Value Date    HGB 12.5 (L) 11/03/2016    HGB 12.7 (L) 11/01/2016     Lab Results   Component Value Date    BUN 15 06/26/2017    BUN 5 (L) 11/01/2016    CR 0.80 06/26/2017    CR 0.78 11/01/2016     Lab Results   Component Value Date    AST 26 06/26/2017    AST 29 10/28/2016    ALT 42 06/26/2017    ALT 38 10/28/2016    ALKPHOS 84 06/26/2017    ALKPHOS 69 10/28/2016    BILITOTAL 0.3 06/26/2017    BILITOTAL 0.8 10/28/2016    BILICONJ 0.0 03/11/2005    BILICONJ 0.0 06/12/2004     No results found for: UAMP, UBARB, BENZODIAZEUR, UCANN, UCOC, OPIT, UPCP    Echocardiogram 9/3/2015: " Interpretation Summary  The visual ejection fraction is estimated at 55%.  The right ventricle is normal in size and function.  Borderline aortic root dilatation.  The rhythm was normal sinus.  There is no comparison study available.    PFT 10/2/2015: The FVC, FEV1, FEV1/FVC ratio and EAB02-19% are within normal limits.  The inspiratory flow rates are within normal limits.  Lung volumes are within normal limits.  Following administration of bronchodilators, there is no significant response.  The diffusing capacity is normal. The results are within normal limits. IMPRESSION: Normal Pulmonary Function          Assessment and Plan:   1. CHARITY (Obstructive Sleep Apnea)-severe ()  2. Morbid obesity (H)    Patient presents to the clinic today to reestablish care for known history of severe obstructive sleep apnea that is currently being treated with BiPAP 21/12 cmH2O.  Unfortunately, we were unable to obtain download/compliance data from his BiPAP machine prior to today's appointment so I am unable to objectively evaluate sleep apnea control.  Subjectively, Ciro reports excellent control of his sleep apnea which is reassuring, though I wonder if pressures may need to be adjusted given 40lb weight gain since last study (today's patient reported weight 400lbs, weight at last study 360lbs). Will need to consider titration study if download reveals sub optimal treatment.     Primary reason for today's visit was to obtain a new prescription for PAP supplies, prescription written today.  When patient goes to  his new mask/supplies I asked him to bring his BiPAP machine with him so that they can obtain download and send to me for review.   - Comprehensive DME    Additionally, we discussed the link between obesity, sleep apnea, and health outcomes and how weight loss can lead to improvement in sleep apnea severity. Counseled regarding weight loss through diet modification (decrease caloric intake) and increased  physical activity. He was encouraged to discuss further strategies with his primary care provider.    Patient to follow up in minimally 1 year, possibly sooner pending review of BiPAP download, or sooner as needed.       Copy to: Keegan Morgan PA-C  Aug 5, 2020     [facility]  [address] , [place] [address]     [facility]  [address] [facility], [place] [address]      Phone call end time: 3:50PM  Phone call duration: 16 minutes    Tory Meeks PA-C

## 2020-08-11 ENCOUNTER — DOCUMENTATION ONLY (OUTPATIENT)
Dept: SLEEP MEDICINE | Facility: CLINIC | Age: 50
End: 2020-08-11

## 2020-09-08 ENCOUNTER — HOSPITAL ENCOUNTER (EMERGENCY)
Facility: CLINIC | Age: 50
Discharge: HOME | End: 2020-09-08
Attending: FAMILY MEDICINE | Admitting: FAMILY MEDICINE
Payer: COMMERCIAL

## 2020-09-08 VITALS
HEART RATE: 95 BPM | WEIGHT: 315 LBS | RESPIRATION RATE: 22 BRPM | BODY MASS INDEX: 52.77 KG/M2 | DIASTOLIC BLOOD PRESSURE: 104 MMHG | OXYGEN SATURATION: 97 % | TEMPERATURE: 97.8 F | SYSTOLIC BLOOD PRESSURE: 163 MMHG

## 2020-09-08 DIAGNOSIS — K64.5 THROMBOSED EXTERNAL HEMORRHOIDS: ICD-10-CM

## 2020-09-08 PROCEDURE — 99284 EMERGENCY DEPT VISIT MOD MDM: CPT | Mod: Z6 | Performed by: FAMILY MEDICINE

## 2020-09-08 PROCEDURE — 99282 EMERGENCY DEPT VISIT SF MDM: CPT | Performed by: FAMILY MEDICINE

## 2020-09-08 RX ORDER — HYDROCORTISONE 25 MG/G
CREAM TOPICAL
Qty: 1 G | Refills: 0 | Status: SHIPPED | OUTPATIENT
Start: 2020-09-08 | End: 2021-05-05

## 2020-09-08 NOTE — LETTER
September 8, 2020      To Whom It May Concern:      Ciro Monet was seen in our Emergency Department today, 09/08/20.  Please excuse him from work September 8 and September 9, 2020, due to an acute medical problem.    Sincerely,        Miguel Lema MD

## 2020-09-08 NOTE — LETTER
September 8, 2020      To Whom It May Concern:      Ciro Monet was seen in our Emergency Department today, 09/08/20.  Please excuse him from work September 8 and September 9, 2020 due to an acute medical problem.    Sincerely,        Miguel Lema MD

## 2020-09-08 NOTE — ED PROVIDER NOTES
History     Chief Complaint   Patient presents with     Hemorrhoids     HPI  Ciro Monet is a 50 year old male who presents with painful swollen lump in the anoderm.  He thinks this is a hemorrhoid.  Once previously had an episode of a painful hemorrhoid.  This began 2 days ago.  He has not been having trouble with constipation.  No fevers.  No abdominal pain.  No nausea or vomiting.    Allergies:  Allergies   Allergen Reactions     Adhesive Tape Rash     3M Plastic adhesive transpore tape per dad. Blisters and rash.     Pine Swelling and Difficulty breathing     Eyes swelling & difficultly breathing.       Problem List:    Patient Active Problem List    Diagnosis Date Noted     Schizoaffective disorder, chronic condition (H) 12/11/2017     Priority: Medium     Incarcerated hernia 10/29/2016     Priority: Medium     Umbilical hernia, incarcerated 10/29/2016     Priority: Medium     Renal cyst needs ct 6/15 11/04/2014     Priority: Medium     Localized superficial swelling, mass, or lump 06/19/2014     Priority: Medium     Morbid obesity (H) 06/30/2011     Priority: Medium     HYPERLIPIDEMIA LDL GOAL <160 10/31/2010     Priority: Medium     CHARITY (Obstructive Sleep Apnea)-severe () 02/09/2010     Priority: Medium     Esophageal reflux 03/05/2007     Priority: Medium     Moderate depressed bipolar I disorder (H) 05/08/2006     Priority: Medium     Problem list name updated by automated process. Provider to review          Past Medical History:    Past Medical History:   Diagnosis Date     Diabetes (H)      Hiatal hernia      Morbid obesity with BMI of 45.0-49.9, adult (H)      CHARITY (obstructive sleep apnea) 4/2010       Past Surgical History:    Past Surgical History:   Procedure Laterality Date     HC REPAIR EPIGASTRIC HERNIA,REDUC  11/23/07     HC TOOTH EXTRACTION W/FORCEP       HEMORRHOID INJECTION SCLEROSING SOLUTION       HERNIORRHAPHY UMBILICAL N/A 10/29/2016    Procedure: HERNIORRHAPHY UMBILICAL;   Surgeon: Lori Barron MD;  Location: [facility]  ?2005?       Family History:    Family History   Problem Relation Age of Onset     Hypertension Father      Genitourinary Problems Father         kidney stones     C.A.D. Maternal Grandfather      Genitourinary Problems Brother         kidney stones     Genitourinary Problems Brother         kidney stones       Social History:  Marital Status:  Single [1]  Social History     Tobacco Use     Smoking status: Never Smoker     Smokeless tobacco: Never Used   Substance Use Topics     Alcohol use: No     Alcohol/week: 0.0 standard drinks     Drug use: No        Medications:    hydrocortisone, Perianal, (ANUSOL-HC) 2.5 % cream  ABILIFY 15 MG tablet  albuterol (PROAIR HFA) 108 (90 Base) MCG/ACT inhaler  INVEGA SUSTENNA 234 MG/1.5ML SUSP  metFORMIN (GLUCOPHAGE-XR) 500 MG 24 hr tablet  OMEPRAZOLE  order for DME          Review of Systems  Further problem focused system review negative.      Physical Exam   BP: (!) 163/104  Pulse: 95  Temp: 97.8  F (36.6  C)  Resp: 22  Weight: (!) 181.4 kg (400 lb)  SpO2: 97 %      Physical Exam  50-year-old male in mild to moderate discomfort does not appear acutely ill and answers questions appropriately and cooperates with examination.  Rectal examination reveals a thrombosed external hemorrhoid 2 to 3 cm in size.  No bleeding.  No surrounding erythema swelling or warmth to suggest abscess.  ED Course        Procedures               Critical Care time:  none               No results found for this or any previous visit (from the past 24 hour(s)).    Medications - No data to display    Assessments & Plan (with Medical Decision Making)     50-year-old presents with thrombosed external hemorrhoid.  We discussed options.  I recommend against thrombectomy as it will only shorten the duration of acute pain by a few hours and exchange for a wound that will bleed and be difficult to manage.  I recommended only local care with  steroid-containing Anusol and hot sitz baths.  Pain should resolve in a couple of days.  He should ensure he has soft stools.  If he is having problems with frequent recurrences, consult colorectal surgery to consider hemorrhoidectomy.    I have reviewed the nursing notes.    I have reviewed the findings, diagnosis, plan and need for follow up with the patient.       New Prescriptions    HYDROCORTISONE, PERIANAL, (ANUSOL-HC) 2.5 % CREAM    Apply a small amount of cream to the external hemorrhoid 2-3 times daily until pain improves.       Final diagnoses:   Thrombosed external hemorrhoids       9/8/2020   [facility] EMERGENCY DEPARTMENT     Miguel Lema MD  09/08/20 0629

## 2020-09-08 NOTE — ED AVS SNAPSHOT
[facility] Emergency Department  [address] [address]  Phone:  [phone]  Fax:  [phone]                                    Ciro Monet   MRN: [id number]    Department:  [facility] Emergency Department   Date of Visit:  9/8/2020           After Visit Summary Signature Page    I have received my discharge instructions, and my questions have been answered. I have discussed any challenges I see with this plan with the nurse or doctor.    ..........................................................................................................................................  Patient/Patient Representative Signature      ..........................................................................................................................................  Patient Representative Print Name and Relationship to Patient    ..................................................               ................................................  Date                                   Time    ..........................................................................................................................................  Reviewed by Signature/Title    ...................................................              ..............................................  Date                                               Time          22EPIC Rev 08/18

## 2020-09-08 NOTE — ED TRIAGE NOTES
2 days of worsening Hemorid discomfort    Has not tried any OTC treatments    Here tonight with his dad

## 2020-09-08 NOTE — DISCHARGE INSTRUCTIONS
Apply a small amount of cream to the external hemorrhoid 2-3 times daily until the pain improves.  Sit in a hot tub of water as much as possible over the next 1 to 2 days.  Avoid heavy lifting, pushing, straining for the next 2 days.  Be sure you are having soft bowel movements and not straining at stool.

## 2020-09-10 ENCOUNTER — HOSPITAL ENCOUNTER (EMERGENCY)
Facility: CLINIC | Age: 50
Discharge: HOME | End: 2020-09-10
Attending: FAMILY MEDICINE | Admitting: FAMILY MEDICINE
Payer: COMMERCIAL

## 2020-09-10 ENCOUNTER — TELEPHONE (OUTPATIENT)
Dept: FAMILY MEDICINE | Facility: CLINIC | Age: 50
End: 2020-09-10

## 2020-09-10 VITALS
HEIGHT: 73 IN | SYSTOLIC BLOOD PRESSURE: 136 MMHG | DIASTOLIC BLOOD PRESSURE: 87 MMHG | RESPIRATION RATE: 18 BRPM | HEART RATE: 90 BPM | TEMPERATURE: 98.6 F | BODY MASS INDEX: 41.75 KG/M2 | WEIGHT: 315 LBS | OXYGEN SATURATION: 95 %

## 2020-09-10 DIAGNOSIS — K64.5 THROMBOSED EXTERNAL HEMORRHOIDS: ICD-10-CM

## 2020-09-10 DIAGNOSIS — K62.5 RECTAL BLEEDING: ICD-10-CM

## 2020-09-10 LAB — HGB BLD-MCNC: 14.9 G/DL (ref 13.3–17.7)

## 2020-09-10 PROCEDURE — 99282 EMERGENCY DEPT VISIT SF MDM: CPT | Mod: Z6 | Performed by: FAMILY MEDICINE

## 2020-09-10 PROCEDURE — 85018 HEMOGLOBIN: CPT | Performed by: FAMILY MEDICINE

## 2020-09-10 PROCEDURE — 99283 EMERGENCY DEPT VISIT LOW MDM: CPT | Performed by: FAMILY MEDICINE

## 2020-09-10 ASSESSMENT — MIFFLIN-ST. JEOR: SCORE: 2728.27

## 2020-09-10 NOTE — TELEPHONE ENCOUNTER
Reason for call:  Patient reporting a symptom    Symptom or request: Ciro asking to be seen today.  Was seen in ED on 9/8/20 for external hemorrhoids, but today they started bleeding.  Wants to be seen.  Please call and assess. Thank you..Leighann Chawla    Duration (how long have symptoms been present): today.     Have you been treated for this before? Not for bleeding hemorrhoids    Phone Number patient can be reached at:  Other phone number:  [phone]*    Best Time:  Any time    Can we leave a detailed message on this number:  YES    Call taken on 9/10/2020 at 10:37 AM by Leighann Chawla

## 2020-09-10 NOTE — ED AVS SNAPSHOT
[facility] Emergency Department  [address] [place] [address]  Phone:  [phone]  Fax:  [phone]                                    Ciro Monet   MRN: [id number]    Department:  [facility] Emergency Department   Date of Visit:  9/10/2020           After Visit Summary Signature Page    I have received my discharge instructions, and my questions have been answered. I have discussed any challenges I see with this plan with the nurse or doctor.    ..........................................................................................................................................  Patient/Patient Representative Signature      ..........................................................................................................................................  Patient Representative Print Name and Relationship to Patient    ..................................................               ................................................  Date                                   Time    ..........................................................................................................................................  Reviewed by Signature/Title    ...................................................              ..............................................  Date                                               Time          22EPIC Rev 08/18

## 2020-09-10 NOTE — DISCHARGE INSTRUCTIONS
ICD-10-CM    1. Thrombosed external hemorrhoids  K64.5 GENERAL SURG ADULT REFERRAL    these will heal.  consider seeing general surgery for longterm management - hemorrhoidectomhy may be performed.  Use preparation-H or similar., no straining at the stool. take miralax 1/2 to 1 capful daily with goal 1 soft stool daily.  maintain hydration >64 oz   2. Rectal bleeding  K62.5 GENERAL SURG ADULT REFERRAL    if bleeding this recurrs (which it will likely do), apply pressure to the area directly over hemorrhoid and the bleeding should stop.  return for persistent heavy bleeding.

## 2020-09-10 NOTE — ED PROVIDER NOTES
History     Chief Complaint   Patient presents with     Hemorrhoids     bleeding hemorrhoids x 2 days ago.   pt seen in ER 2 days ago and now increased bleeding.     HPI  Ciro Monet is a 50 year old male who presents with history of bipolar disorder, schizoaffective disorder, obstructive sleep apnea and morbid obesity -on Abilify and Invega as well as metformin.  He has had pain in the perirectal area which is been more severe approximately 4 days ago, but pain had subsided and he was evaluated here at which point thrombosed hemorrhoids were identified but appeared to be improving.  He is now having bleeding per rectum that he noted at work and was concerned for possible heavy bleeding.    He has been straining periodically at the stool.  History of constipation.  Denies black tarry stool.  No abdominal pain.  No fever.  No other associated symptoms.  Does not feel lightheaded.    Tells me he had a colonoscopy approximately 6 years ago and that this was normal.    Allergies:  Allergies   Allergen Reactions     Adhesive Tape Rash     3M Plastic adhesive transpore tape per dad. Blisters and rash.     Pine Swelling and Difficulty breathing     Eyes swelling & difficultly breathing.       Problem List:    Patient Active Problem List    Diagnosis Date Noted     Schizoaffective disorder, chronic condition (H) 12/11/2017     Priority: Medium     Incarcerated hernia 10/29/2016     Priority: Medium     Umbilical hernia, incarcerated 10/29/2016     Priority: Medium     Renal cyst needs ct 6/15 11/04/2014     Priority: Medium     Localized superficial swelling, mass, or lump 06/19/2014     Priority: Medium     Morbid obesity (H) 06/30/2011     Priority: Medium     HYPERLIPIDEMIA LDL GOAL <160 10/31/2010     Priority: Medium     CHARITY (Obstructive Sleep Apnea)-severe () 02/09/2010     Priority: Medium     Esophageal reflux 03/05/2007     Priority: Medium     Moderate depressed bipolar I disorder (H) 05/08/2006      "Priority: Medium     Problem list name updated by automated process. Provider to review          Past Medical History:    Past Medical History:   Diagnosis Date     Diabetes (H)      Hiatal hernia      Morbid obesity with BMI of 45.0-49.9, adult (H)      CHARITY (obstructive sleep apnea) 4/2010       Past Surgical History:    Past Surgical History:   Procedure Laterality Date     HC REPAIR EPIGASTRIC HERNIA,REDUC  11/23/07     HC TOOTH EXTRACTION W/FORCEP       HEMORRHOID INJECTION SCLEROSING SOLUTION       HERNIORRHAPHY UMBILICAL N/A 10/29/2016    Procedure: HERNIORRHAPHY UMBILICAL;  Surgeon: Lori Barron MD;  Location:  OR     [facility]  ?2005?       Family History:    Family History   Problem Relation Age of Onset     Hypertension Father      Genitourinary Problems Father         kidney stones     C.A.D. Maternal Grandfather      Genitourinary Problems Brother         kidney stones     Genitourinary Problems Brother         kidney stones       Social History:  Marital Status:  Single [1]  Social History     Tobacco Use     Smoking status: Never Smoker     Smokeless tobacco: Never Used   Substance Use Topics     Alcohol use: No     Alcohol/week: 0.0 standard drinks     Drug use: No        Medications:    ABILIFY 15 MG tablet  albuterol (PROAIR HFA) 108 (90 Base) MCG/ACT inhaler  hydrocortisone, Perianal, (ANUSOL-HC) 2.5 % cream  INVEGA SUSTENNA 234 MG/1.5ML SUSP  metFORMIN (GLUCOPHAGE-XR) 500 MG 24 hr tablet  OMEPRAZOLE  order for DME          Review of Systems   ROS:  5 point ROS negative except as noted above in HPI, including Gen., Resp., CV, GI &  system review.      Physical Exam   BP: 136/87  Pulse: 123  Temp: 98.6  F (37  C)  Resp: 18  Height: 185.4 cm (6' 1\")  Weight: (!) 181.4 kg (400 lb)  SpO2: 95 %      Physical Exam  Constitutional:       General: He is not in acute distress.     Appearance: He is not ill-appearing.   Cardiovascular:      Rate and Rhythm: Normal rate and regular rhythm. "   Pulmonary:      Effort: Pulmonary effort is normal. No respiratory distress.      Breath sounds: No stridor.   Skin:     Coloration: Skin is not pale.      Findings: No rash.   Neurological:      Mental Status: He is alert.       Perirectal area with thrombosed hemorrhoid in the process of evolving.  Small punctate area of minimal dark blood.  No significant active bleeding.    ED Course        Procedures               Critical Care time:  none               Results for orders placed or performed during the hospital encounter of 09/10/20 (from the past 24 hour(s))   Hemaglobin   Result Value Ref Range    Hemoglobin 14.9 13.3 - 17.7 g/dL       Medications - No data to display    Assessments & Plan (with Medical Decision Making)     MDM: Ciro Monet is a 50 year old male presenting with an external hemorrhoid that has been thrombosed and is now in the process of healing.  No significant pain at this time.  Blood earlier today and I can see the spot likely was the source of bleeding.  We discussed management at home with pressure if it bleeds.  Avoiding straining with the stooling and maintaining hydration MiraLAX for stool softening fluids fiber and following up with general surgery related hemorrhoids for possible hemorrhoidectomy.    Findings are reassuring.  All signs were reassuring.  Hemoglobin is 14.9.  His initial heart rate was 120 after walking in but recheck is 90.  Patient has no systemic symptoms related to his presentation.        I have reviewed the nursing notes.    I have reviewed the findings, diagnosis, plan and need for follow up with the patient.       New Prescriptions    No medications on file       Final diagnoses:   Thrombosed external hemorrhoids - these will heal.  consider seeing general surgery for longterm management - hemorrhoidectomhy may be performed.  Use preparation-H or similar., no straining at the stool. take miralax 1/2 to 1 capful daily with goal 1 soft stool daily.  maintain  hydration >64 oz   Rectal bleeding - if bleeding this recurrs (which it will likely do), apply pressure to the area directly over hemorrhoid and the bleeding should stop.  return for persistent heavy bleeding.       9/10/2020   [facility] EMERGENCY DEPARTMENT     Carlos Woods MD  09/10/20 6032

## 2020-09-11 ENCOUNTER — TELEPHONE (OUTPATIENT)
Dept: SURGERY | Facility: CLINIC | Age: 50
End: 2020-09-11

## 2020-09-11 ENCOUNTER — HOSPITAL ENCOUNTER (EMERGENCY)
Facility: CLINIC | Age: 50
Discharge: HOME | End: 2020-09-11
Attending: FAMILY MEDICINE | Admitting: FAMILY MEDICINE
Payer: COMMERCIAL

## 2020-09-11 VITALS
HEIGHT: 73 IN | DIASTOLIC BLOOD PRESSURE: 96 MMHG | HEART RATE: 98 BPM | TEMPERATURE: 99.3 F | SYSTOLIC BLOOD PRESSURE: 173 MMHG | WEIGHT: 315 LBS | BODY MASS INDEX: 41.75 KG/M2 | OXYGEN SATURATION: 96 %

## 2020-09-11 DIAGNOSIS — K64.4 EXTERNAL HEMORRHOID, BLEEDING: ICD-10-CM

## 2020-09-11 DIAGNOSIS — K64.5 THROMBOSED EXTERNAL HEMORRHOIDS: ICD-10-CM

## 2020-09-11 LAB — HGB BLD-MCNC: 13.9 G/DL (ref 13.3–17.7)

## 2020-09-11 PROCEDURE — 46083 INC THROMBOSED HROID XTRNL: CPT | Performed by: FAMILY MEDICINE

## 2020-09-11 PROCEDURE — 85018 HEMOGLOBIN: CPT | Performed by: FAMILY MEDICINE

## 2020-09-11 PROCEDURE — 99284 EMERGENCY DEPT VISIT MOD MDM: CPT | Mod: 25 | Performed by: FAMILY MEDICINE

## 2020-09-11 PROCEDURE — 99283 EMERGENCY DEPT VISIT LOW MDM: CPT | Mod: 25 | Performed by: FAMILY MEDICINE

## 2020-09-11 PROCEDURE — 46083 INC THROMBOSED HROID XTRNL: CPT | Mod: Z6 | Performed by: FAMILY MEDICINE

## 2020-09-11 ASSESSMENT — MIFFLIN-ST. JEOR: SCORE: 2728.27

## 2020-09-11 NOTE — DISCHARGE INSTRUCTIONS
ICD-10-CM    1. Thrombosed external hemorrhoids  K64.5     this was evacuated today of clot and I expect this to continue to heal now.   evacuation of clot should now reduce bleeding.  keep bandaged and returtn for signs of infection   2. External hemorrhoid, bleeding  K64.4

## 2020-09-11 NOTE — TELEPHONE ENCOUNTER
Reason for call:  Patient reporting a symptom    Symptom or request: Pt was in the ER yesterday - told his hemorrhoid was going to bleed but pt concerned about how much.  States he has been bleeding all night.    Duration (how long have symptoms been present): all night    Have you been treated for this before? In ER yesterday - has future appt scheduled    Additional comments:     Phone Number patient can be reached at:  Cell number on file:    Telephone Information:   Mobile [phone]       Best Time:      Can we leave a detailed message on this number:  YES    Call taken on 9/11/2020 at 8:56 AM by Rina Pierre

## 2020-09-11 NOTE — ED AVS SNAPSHOT
[facility] Emergency Department  [address] [address]  Phone:  [phone]  Fax:  [phone]                                    Ciro Monet   MRN: [id number]    Department:  [facility] Emergency Department   Date of Visit:  9/11/2020           After Visit Summary Signature Page    I have received my discharge instructions, and my questions have been answered. I have discussed any challenges I see with this plan with the nurse or doctor.    ..........................................................................................................................................  Patient/Patient Representative Signature      ..........................................................................................................................................  Patient Representative Print Name and Relationship to Patient    ..................................................               ................................................  Date                                   Time    ..........................................................................................................................................  Reviewed by Signature/Title    ...................................................              ..............................................  Date                                               Time          22EPIC Rev 08/18

## 2020-09-11 NOTE — TELEPHONE ENCOUNTER
Advised that once open, can bleed a fair amount.  Advised if surrounding tissue gets excoriated or bleeding does not slowly reduce can return to the ER for possible cautery.    Pt agrees to advise.    Germania RAYO   [facility] RN

## 2020-09-12 NOTE — ED PROVIDER NOTES
History     Chief Complaint   Patient presents with     Rectal Bleeding     hx of hemorrhoids      HPI  Ciro Monet is a 50 year old male who returns for bleeding from sternal hemorrhoids that persisted all night last evening and today.  He is been diagnosed initially with thrombosed hemorrhoids several days ago when I saw him yesterday after pain had resolved and thrombosed purulence started to erode through the wall of the hemorrhoid and was resolving on its own.  However he had had bleeding at that time hemoglobin 14.9.  Arrived here again today because of persistent bleeding throughout the day and night.    No significant cardiopulmonary symptoms.    Allergies:  Allergies   Allergen Reactions     Adhesive Tape Rash     3M Plastic adhesive transpore tape per dad. Blisters and rash.     Pine Swelling and Difficulty breathing     Eyes swelling & difficultly breathing.       Problem List:    Patient Active Problem List    Diagnosis Date Noted     Schizoaffective disorder, chronic condition (H) 12/11/2017     Priority: Medium     Incarcerated hernia 10/29/2016     Priority: Medium     Umbilical hernia, incarcerated 10/29/2016     Priority: Medium     Renal cyst needs ct 6/15 11/04/2014     Priority: Medium     Localized superficial swelling, mass, or lump 06/19/2014     Priority: Medium     Morbid obesity (H) 06/30/2011     Priority: Medium     HYPERLIPIDEMIA LDL GOAL <160 10/31/2010     Priority: Medium     CHARITY (Obstructive Sleep Apnea)-severe () 02/09/2010     Priority: Medium     Esophageal reflux 03/05/2007     Priority: Medium     Moderate depressed bipolar I disorder (H) 05/08/2006     Priority: Medium     Problem list name updated by automated process. Provider to review          Past Medical History:    Past Medical History:   Diagnosis Date     Diabetes (H)      Hiatal hernia      Morbid obesity with BMI of 45.0-49.9, adult (H)      CHARITY (obstructive sleep apnea) 4/2010       Past Surgical History:  "   Past Surgical History:   Procedure Laterality Date     HC REPAIR EPIGASTRIC HERNIA,REDUC  11/23/07     HC TOOTH EXTRACTION W/FORCEP       HEMORRHOID INJECTION SCLEROSING SOLUTION       HERNIORRHAPHY UMBILICAL N/A 10/29/2016    Procedure: HERNIORRHAPHY UMBILICAL;  Surgeon: Lori Barron MD;  Location:  OR     [facility]  ?2005?       Family History:    Family History   Problem Relation Age of Onset     Hypertension Father      Genitourinary Problems Father         kidney stones     C.A.D. Maternal Grandfather      Genitourinary Problems Brother         kidney stones     Genitourinary Problems Brother         kidney stones       Social History:  Marital Status:  Single [1]  Social History     Tobacco Use     Smoking status: Never Smoker     Smokeless tobacco: Never Used   Substance Use Topics     Alcohol use: No     Alcohol/week: 0.0 standard drinks     Drug use: No        Medications:    ABILIFY 15 MG tablet  albuterol (PROAIR HFA) 108 (90 Base) MCG/ACT inhaler  hydrocortisone, Perianal, (ANUSOL-HC) 2.5 % cream  INVEGA SUSTENNA 234 MG/1.5ML SUSP  metFORMIN (GLUCOPHAGE-XR) 500 MG 24 hr tablet  OMEPRAZOLE  order for DME          Review of Systems   ROS:  5 point ROS negative except as noted above in HPI, including Gen., Resp., CV, GI &  system review.      Physical Exam   BP: (!) 173/96  Pulse: 98  Temp: 99.3  F (37.4  C)  Height: 185.4 cm (6' 1\")  Weight: (!) 181.4 kg (400 lb)  SpO2: 96 %       Physical Exam     Perianal area demonstrates once again thrombosed hemorrhoids.  There are several areas of retained thrombosed clots.  There is a small amount of bright red blood in the region as well.  Offered to open the thrombosed hemorrhoid and remove the clot which may prevent further significant bleeding.  Suspected that the retained clot might be allowing for persistent bleeding.    ED Course        [facility]    PROCEDURE: -Incision/Drainage    Date/Time: 9/11/2020 7:24 " PM  Performed by: Carlos Woods MD  Authorized by: Carlos Woods MD       LOCATION:      Type:  External thrombosed hemorrhoid    Size:  4    Location:  Anogenital    Anogenital location:  Perianal    PRE-PROCEDURE DETAILS:     Skin preparation:  Hibiclens    ANESTHESIA (see MAR for exact dosages):     Anesthesia method:  Local infiltration    Local anesthetic:  Lidocaine 1% WITH epi    PROCEDURE TYPE:     Complexity:  Simple    PROCEDURE DETAILS:     Incision types:  Single straight    Incision depth:  Subcutaneous    Scalpel blade:  10    Wound management:  Probed and deloculated    Drainage:  Bloody (several thrombosed clots rem,joaquim)    Drainage amount:  Moderate    Wound treatment:  Wound left open  Post-procedure details:     PROCEDURE   Patient Tolerance:  Patient tolerated the procedure well with no immediate complications                     Critical Care time:  none               Results for orders placed or performed during the hospital encounter of 09/11/20 (from the past 24 hour(s))   Hemaglobin   Result Value Ref Range    Hemoglobin 13.9 13.3 - 17.7 g/dL       Medications - No data to display    Assessments & Plan (with Medical Decision Making)     MDM: Ciro Monet is a 50 year old male presents with thrombosed hemorrhoid with persistent bleeding.  He open the areas that were already eroding through with thrombosed material and removed the clot within.   did not require elliptical incision that had already opened on its own.  Clot was easily removed.  No active bleeding from the site.  Discussed management as below with precautions given for return.    I have reviewed the nursing notes.    I have reviewed the findings, diagnosis, plan and need for follow up with the patient.       Discharge Medication List as of 9/11/2020  6:46 PM          Final diagnoses:   Thrombosed external hemorrhoids - this was evacuated today of clot and I expect this to continue to heal now.   evacuation of clot should  now reduce bleeding.  keep bandaged and returtn for signs of infection   External hemorrhoid, bleeding       9/11/2020   [facility] EMERGENCY DEPARTMENT     Carlos Woods MD  09/11/20 1927

## 2020-09-18 ENCOUNTER — OFFICE VISIT (OUTPATIENT)
Dept: FAMILY MEDICINE | Facility: CLINIC | Age: 50
End: 2020-09-18
Payer: COMMERCIAL

## 2020-09-18 VITALS
DIASTOLIC BLOOD PRESSURE: 84 MMHG | HEIGHT: 73 IN | HEART RATE: 89 BPM | TEMPERATURE: 97.5 F | BODY MASS INDEX: 41.75 KG/M2 | SYSTOLIC BLOOD PRESSURE: 130 MMHG | OXYGEN SATURATION: 95 % | RESPIRATION RATE: 19 BRPM | WEIGHT: 315 LBS

## 2020-09-18 DIAGNOSIS — K64.4 EXTERNAL HEMORRHOIDS: Primary | ICD-10-CM

## 2020-09-18 DIAGNOSIS — N28.1 RENAL CYST: ICD-10-CM

## 2020-09-18 DIAGNOSIS — Z13.220 SCREENING FOR LIPOID DISORDERS: ICD-10-CM

## 2020-09-18 DIAGNOSIS — F31.32 MODERATE DEPRESSED BIPOLAR I DISORDER (H): ICD-10-CM

## 2020-09-18 DIAGNOSIS — Z12.11 SPECIAL SCREENING FOR MALIGNANT NEOPLASMS, COLON: ICD-10-CM

## 2020-09-18 DIAGNOSIS — G47.33 OSA (OBSTRUCTIVE SLEEP APNEA): ICD-10-CM

## 2020-09-18 DIAGNOSIS — Z11.4 SCREENING FOR HIV (HUMAN IMMUNODEFICIENCY VIRUS): ICD-10-CM

## 2020-09-18 DIAGNOSIS — R73.09 ELEVATED GLUCOSE: ICD-10-CM

## 2020-09-18 DIAGNOSIS — Z23 NEED FOR PROPHYLACTIC VACCINATION AND INOCULATION AGAINST INFLUENZA: ICD-10-CM

## 2020-09-18 PROCEDURE — 90682 RIV4 VACC RECOMBINANT DNA IM: CPT | Performed by: PHYSICIAN ASSISTANT

## 2020-09-18 PROCEDURE — 99214 OFFICE O/P EST MOD 30 MIN: CPT | Mod: 25 | Performed by: PHYSICIAN ASSISTANT

## 2020-09-18 PROCEDURE — 90471 IMMUNIZATION ADMIN: CPT | Performed by: PHYSICIAN ASSISTANT

## 2020-09-18 RX ORDER — PALIPERIDONE PALMITATE 234 MG/1.5ML
234 INJECTION INTRAMUSCULAR
COMMUNITY
Start: 2022-01-31

## 2020-09-18 ASSESSMENT — MIFFLIN-ST. JEOR: SCORE: 2757.3

## 2020-09-18 NOTE — PATIENT INSTRUCTIONS
Colonoscopy: If you have not heard from the scheduling office within 2 business days, please call [phone].    Follow up for your physical and fasting labwork    For constipation:   1. Consume at least 8 glasses of water per day   2. Exercise for at least 30 minutes every day. Regular exercise helps to keep your digestive system active and and healthy and may help with constipation.   3. Increase dietary fiber. Goal of 25 grams per day for women, 35 grams per day for men. If unable to consume 25-35 grams through diet alone consider OTC supplements such as Benefiber, FiberCon, Metamucil, or Citrucel.   4. Recommend taking Miralax (17 grams = 1 scoop). Take once daily, can mix with anything. If you experience increasing bowel movements and diarrhea, decrease to every other day or every 3rd day. Miralax is an osmotic laxative that increases the amount of water secreted by the intestines resulting in softer and easier to pass stools.       Patient Education     Thrombosed Hemorrhoids    Hemorrhoids are swollen veins in the lower rectum and anus. They're similar to varicose veins that form in the legs. Hemorrhoids can happen inside the rectum (internal hemorrhoids). Or one may form at the anal opening (external hemorrhoids). They may bleed, but most hemorrhoids aren't cause for concern. But a small blood clot (thrombus) can develop in an external hemorrhoid. This may lead to severe pain and sometimes bleeding.  When to go to the emergency room (ER)  If you have severe pain or excessive bleeding, seek medical care right away.  What to expect in the ER  A healthcare provider is likely to check your anus and rectum using a thin, lighted tube (anoscope or proctoscope). You will get a local pain reliever (anesthetic) to ease any mild pain.  Treatment  Treatment recommendations include:    If the blood clot has formed within the past 48 to 72 hours, your healthcare provider may remove it from within the hemorrhoid. This is  a simple procedure that can ease pain. You will have a local anesthetic to keep you pain-free during the procedure. The provider makes a small cut (incision) in the skin and removes the blood clot. Stitches are generally not needed.    If more than 72 hours have passed, your healthcare provider will suggest home treatments. Simple home treatments can ease your pain. These may include warm baths, ointments, suppositories, and witch hazel compresses. Many thrombosed hemorrhoids go away on their own in a few weeks.    If you have bleeding that continues or painful hemorrhoids, talk with your healthcare provider. Possible treatment may include banding, ligation, or removal (hemorrhoidectomy).  Tips for preventing hemorrhoids  Tips include:    Eat foods that are high in fiber and use fiber supplements to help prevent constipation.    Drink plenty of liquids.    Get regular exercise to help prevent constipation and promote good bowel function.  Date Last Reviewed: 8/1/2018 2000-2019 [employer]. [address] [place] [address], [place] [address]. All rights reserved. This information is not intended as a substitute for professional medical care. Always follow your healthcare professional's instructions.

## 2020-09-18 NOTE — PROGRESS NOTES
"Subjective     Ciro Monet is a 50 year old male who presents to clinic today for the following health issues:    HPI       ED/UC Followup:    Facility:  [facility]  Date of visit: 9/11/2020  Reason for visit: Hemorrhoids    Current Status: Things are going well no more bleeding     History of constipation  Started miralax. BMs now once a day, soft, formed      Felt better immediately after pulling the clots out. No further bleeding. \"a bit sore\"        Review of Systems   Other than noted above, general, HEENT, respiratory, cardiac, MS, and gastrointestinal systems are negative.       Objective    /84   Pulse 89   Temp 97.5  F (36.4  C) (Tympanic)   Resp 19   Ht 1.854 m (6' 1\")   Wt (!) 184.3 kg (406 lb 6.4 oz)   SpO2 95%   BMI 53.62 kg/m    Body mass index is 53.62 kg/m .  Physical Exam   GENERAL: healthy, alert and no distress  RESP: lungs clear to auscultation - no rales, rhonchi or wheezes  CV: regular rate and rhythm, normal S1 S2, no S3 or S4, no murmur, click or rub, no peripheral edema and peripheral pulses strong  ABDOMEN: soft, nontender, no hepatosplenomegaly, no masses and bowel sounds normal  RECTAL (male): POSITIVE   1 oclock nontender external hemorrhoid. No thrombosis. No erythema or inflammaiton        Assessment & Plan     ASSESSMENT/PLAN:      ICD-10-CM    1. External hemorrhoids  K64.4    2. Renal cyst needs ct 6/15  N28.1 US Renal Limited   3. Special screening for malignant neoplasms, colon  Z12.11 GASTROENTEROLOGY ADULT REF PROCEDURE ONLY   4. CHARITY (Obstructive Sleep Apnea)-severe ()  G47.33    5. Screening for HIV (human immunodeficiency virus)  Z11.4 **HIV Antigen Antibody Combo FUTURE anytime   6. Screening for lipoid disorders  Z13.220 Lipid panel reflex to direct LDL Fasting   7. Elevated glucose  R73.09 **A1C FUTURE 1yr   8. Moderate depressed bipolar I disorder (H)  F31.32 **Comprehensive metabolic panel FUTURE 1yr   9. Need for prophylactic vaccination and " inoculation against influenza  Z23 INFLUENZA QUAD, RECOMBINANT, P-FREE (RIV4) (FLUBLOCK) [03049]     Vaccine Administration, Initial [43808]     Hemorrhoids: improved, doing much better. Did recommend colonoscopy for screening  Discussed other preventive care, scheduled physical with PCP     Patient Instructions   Colonoscopy: If you have not heard from the scheduling office within 2 business days, please call [phone].    Follow up for your physical and fasting labwork    For constipation:   1. Consume at least 8 glasses of water per day   2. Exercise for at least 30 minutes every day. Regular exercise helps to keep your digestive system active and and healthy and may help with constipation.   3. Increase dietary fiber. Goal of 25 grams per day for women, 35 grams per day for men. If unable to consume 25-35 grams through diet alone consider OTC supplements such as Benefiber, FiberCon, Metamucil, or Citrucel.   4. Recommend taking Miralax (17 grams = 1 scoop). Take once daily, can mix with anything. If you experience increasing bowel movements and diarrhea, decrease to every other day or every 3rd day. Miralax is an osmotic laxative that increases the amount of water secreted by the intestines resulting in softer and easier to pass stools.     Return in about 10 days (around 9/28/2020) for Physical Exam.    Cynthia Lake PA-C  [facility]

## 2020-09-24 DIAGNOSIS — Z11.59 ENCOUNTER FOR SCREENING FOR OTHER VIRAL DISEASES: Primary | ICD-10-CM

## 2020-09-28 ENCOUNTER — OFFICE VISIT (OUTPATIENT)
Dept: FAMILY MEDICINE | Facility: CLINIC | Age: 50
End: 2020-09-28
Payer: COMMERCIAL

## 2020-09-28 VITALS
OXYGEN SATURATION: 97 % | WEIGHT: 315 LBS | SYSTOLIC BLOOD PRESSURE: 136 MMHG | BODY MASS INDEX: 54.19 KG/M2 | TEMPERATURE: 97.7 F | RESPIRATION RATE: 22 BRPM | DIASTOLIC BLOOD PRESSURE: 78 MMHG | HEART RATE: 86 BPM

## 2020-09-28 DIAGNOSIS — F25.9 SCHIZOAFFECTIVE DISORDER, CHRONIC CONDITION (H): Primary | ICD-10-CM

## 2020-09-28 DIAGNOSIS — Z00.00 ROUTINE GENERAL MEDICAL EXAMINATION AT A HEALTH CARE FACILITY: ICD-10-CM

## 2020-09-28 DIAGNOSIS — Z12.5 SCREENING FOR PROSTATE CANCER: ICD-10-CM

## 2020-09-28 DIAGNOSIS — Z00.01 ENCOUNTER FOR ROUTINE ADULT MEDICAL EXAM WITH ABNORMAL FINDINGS: ICD-10-CM

## 2020-09-28 DIAGNOSIS — R73.09 ELEVATED GLUCOSE: ICD-10-CM

## 2020-09-28 LAB
HBA1C MFR BLD: 5.6 % (ref 0–5.6)
HGB BLD-MCNC: 14.8 G/DL (ref 13.3–17.7)

## 2020-09-28 PROCEDURE — 36415 COLL VENOUS BLD VENIPUNCTURE: CPT | Performed by: FAMILY MEDICINE

## 2020-09-28 PROCEDURE — 99396 PREV VISIT EST AGE 40-64: CPT | Performed by: FAMILY MEDICINE

## 2020-09-28 PROCEDURE — 83036 HEMOGLOBIN GLYCOSYLATED A1C: CPT | Performed by: FAMILY MEDICINE

## 2020-09-28 PROCEDURE — 80053 COMPREHEN METABOLIC PANEL: CPT | Performed by: FAMILY MEDICINE

## 2020-09-28 PROCEDURE — G0103 PSA SCREENING: HCPCS | Performed by: FAMILY MEDICINE

## 2020-09-28 PROCEDURE — 85018 HEMOGLOBIN: CPT | Performed by: FAMILY MEDICINE

## 2020-09-28 ASSESSMENT — PAIN SCALES - GENERAL: PAINLEVEL: SEVERE PAIN (6)

## 2020-09-28 NOTE — PROGRESS NOTES
SUBJECTIVE:   CC: Ciro Monet is an 50 year old male who presents for preventive health visit.       Patient has been advised of split billing requirements and indicates understanding: Yes  Healthy Habits:    Do you get at least three servings of calcium containing foods daily (dairy, green leafy vegetables, etc.)? yes    Amount of exercise or daily activities, outside of work: none    Problems taking medications regularly No    Medication side effects: No    Have you had an eye exam in the past two years? yes    Do you see a dentist twice per year? yes    Do you have sleep apnea, excessive snoring or daytime drowsiness?yes, sleep apnea    Today's PHQ-2 Score:   PHQ-2 ( 1999 Pfizer) 9/28/2020 9/18/2020   Q1: Little interest or pleasure in doing things 0 0   Q2: Feeling down, depressed or hopeless 0 0   PHQ-2 Score 0 0     Abuse: Current or Past(Physical, Sexual or Emotional)- No  Do you feel safe in your environment? Yes    Social History     Tobacco Use     Smoking status: Never Smoker     Smokeless tobacco: Never Used   Substance Use Topics     Alcohol use: No     Alcohol/week: 0.0 standard drinks     If you drink alcohol do you typically have >3 drinks per day or >7 drinks per week? No                      Last PSA: No results found for: PSA    Reviewed orders with patient. Reviewed health maintenance and updated orders accordingly - Yes  Labs reviewed in EPIC  BP Readings from Last 3 Encounters:   09/28/20 136/78   09/18/20 130/84   09/11/20 (!) 173/96    Wt Readings from Last 3 Encounters:   09/28/20 (!) 186.3 kg (410 lb 11.2 oz)   09/18/20 (!) 184.3 kg (406 lb 6.4 oz)   09/11/20 (!) 181.4 kg (400 lb)             Patient Active Problem List   Diagnosis     Moderate depressed bipolar I disorder (H)     Esophageal reflux     CHARITY (Obstructive Sleep Apnea)-severe ()     HYPERLIPIDEMIA LDL GOAL <160     Morbid obesity (H)     Localized superficial swelling, mass, or lump     Renal cyst needs ct 6/15      Incarcerated hernia     Umbilical hernia, incarcerated     Schizoaffective disorder, chronic condition (H)     Past Surgical History:   Procedure Laterality Date     HC REPAIR EPIGASTRIC HERNIA,REDUC  11/23/07     HC TOOTH EXTRACTION W/FORCEP       HEMORRHOID INJECTION SCLEROSING SOLUTION       HERNIORRHAPHY UMBILICAL N/A 10/29/2016    Procedure: HERNIORRHAPHY UMBILICAL;  Surgeon: Lori Barron MD;  Location:  OR     [facility]  ?2005?       Social History     Tobacco Use     Smoking status: Never Smoker     Smokeless tobacco: Never Used   Substance Use Topics     Alcohol use: No     Alcohol/week: 0.0 standard drinks     Family History   Problem Relation Age of Onset     Hypertension Father      Genitourinary Problems Father         kidney stones     C.A.D. Maternal Grandfather      Colon Cancer Paternal Grandfather         ?unsure     Alcoholism Paternal Grandfather      Genitourinary Problems Brother         kidney stones     Genitourinary Problems Brother         kidney stones         Current Outpatient Medications   Medication Sig Dispense Refill     ABILIFY 15 MG tablet Take 1 tablet by mouth daily       INVEGA SUSTENNA 234 MG/1.5ML PIETER INJECT 1 SYRINGE INTRAMUSCULARLY EVERY FOUR WEEKS TO BE ADMINISTERED BY PHARMACIST ON 9/10/20       metFORMIN (GLUCOPHAGE-XR) 500 MG 24 hr tablet Takes four tablets in am  3     order for DME Equipment being ordered: BIPAP  Patient Ciro Monet received a [employer] REMstar BiPap (60 Series) Bilevel. Pressures were set at 21/12 cm H2O.       albuterol (PROAIR HFA) 108 (90 Base) MCG/ACT inhaler 2 puffs at least TID and 2 puffs Q 4 hours prn. (Patient not taking: Reported on 9/18/2020) 1 Inhaler 1     hydrocortisone, Perianal, (ANUSOL-HC) 2.5 % cream Apply a small amount of cream to the external hemorrhoid 2-3 times daily until pain improves. (Patient not taking: Reported on 9/18/2020) 1 g 0       Reviewed and updated as needed this visit by clinical staff  Tobacco   Allergies  Meds  Med Hx  Surg Hx  Fam Hx  Soc Hx        Reviewed and updated as needed this   visit by Provider            ROS:  CONSTITUTIONAL: NEGATIVE for fever, chills, change in weight  INTEGUMENTARY/SKIN: NEGATIVE for worrisome rashes, moles or lesions  EYES: NEGATIVE for vision changes or irritation  ENT: NEGATIVE for ear, mouth and throat problems  RESP: NEGATIVE for significant cough or SOB  CV: NEGATIVE for chest pain, palpitations or peripheral edema  GI: NEGATIVE for nausea, abdominal pain, heartburn, or change in bowel habits   male: negative for dysuria, hematuria, decreased urinary stream, erectile dysfunction, urethral discharge  MUSCULOSKELETAL: NEGATIVE for significant arthralgias or myalgia  NEURO: NEGATIVE for weakness, dizziness or paresthesias  PSYCHIATRIC: NEGATIVE for changes in mood or affect    OBJECTIVE:   /78   Pulse 86   Temp 97.7  F (36.5  C) (Tympanic)   Resp 22   Wt (!) 186.3 kg (410 lb 11.2 oz)   SpO2 97%   BMI 54.19 kg/m       EXAM:  GENERAL: healthy, alert and no distress  NECK: no adenopathy, no asymmetry, masses, or scars and thyroid normal to palpation  RESP: lungs clear to auscultation - no rales, rhonchi or wheezes  CV: regular rate and rhythm, normal S1 S2, no S3 or S4, no murmur, click or rub, no peripheral edema and peripheral pulses strong  ABDOMEN: soft, nontender, no hepatosplenomegaly, no masses and bowel sounds normal  MS: no gross musculoskeletal defects noted, no edema    Diagnostic Test Results:  Labs reviewed in Epic    ASSESSMENT/PLAN:   1. Routine general medical examination at a health care facility      2. Encounter for routine adult medical exam with abnormal findings      3. Schizoaffective disorder, chronic condition (H)  Good control.  Continue currant medications, continue to monito   - Comprehensive metabolic panel (BMP + Alb, Alk Phos, ALT, AST, Total. Bili, TP)  - Hemoglobin    4. Elevated glucose    - **A1C FUTURE anytime    5.  "Screening for prostate cancer  - PSA, screen    Sleep apnea using cpap regular with good effect.  Patient has been advised of split billing requirements and indicates understanding: Yes  COUNSELING:  Reviewed preventive health counseling, as reflected in patient instructions       Regular exercise       Vision screening       Hearing screening       Colon cancer screening       Prostate cancer screening    Estimated body mass index is 54.19 kg/m  as calculated from the following:    Height as of 9/18/20: 1.854 m (6' 1\").    Weight as of this encounter: 186.3 kg (410 lb 11.2 oz).    Weight management plan: Discussed healthy diet and exercise guidelines    He reports that he has never smoked. He has never used smokeless tobacco.      Counseling Resources:  ATP IV Guidelines  Pooled Cohorts Equation Calculator  FRAX Risk Assessment  ICSI Preventive Guidelines  Dietary Guidelines for Americans, 2010  USDA's MyPlate  ASA Prophylaxis  Lung CA Screening    Keegan Morgan MD  [facility]  "

## 2020-09-29 LAB
ALBUMIN SERPL-MCNC: 4 G/DL (ref 3.4–5)
ALP SERPL-CCNC: 78 U/L (ref 40–150)
ALT SERPL W P-5'-P-CCNC: 35 U/L (ref 0–70)
ANION GAP SERPL CALCULATED.3IONS-SCNC: 5 MMOL/L (ref 3–14)
AST SERPL W P-5'-P-CCNC: 27 U/L (ref 0–45)
BILIRUB SERPL-MCNC: 0.3 MG/DL (ref 0.2–1.3)
BUN SERPL-MCNC: 18 MG/DL (ref 7–30)
CALCIUM SERPL-MCNC: 9.4 MG/DL (ref 8.5–10.1)
CHLORIDE SERPL-SCNC: 102 MMOL/L (ref 94–109)
CO2 SERPL-SCNC: 27 MMOL/L (ref 20–32)
CREAT SERPL-MCNC: 0.91 MG/DL (ref 0.66–1.25)
GFR SERPL CREATININE-BSD FRML MDRD: >90 ML/MIN/{1.73_M2}
GLUCOSE SERPL-MCNC: 80 MG/DL (ref 70–99)
POTASSIUM SERPL-SCNC: 4.1 MMOL/L (ref 3.4–5.3)
PROT SERPL-MCNC: 7.7 G/DL (ref 6.8–8.8)
PSA SERPL-ACNC: 4.38 UG/L (ref 0–4)
SODIUM SERPL-SCNC: 134 MMOL/L (ref 133–144)

## 2020-10-19 ENCOUNTER — ANESTHESIA EVENT (OUTPATIENT)
Dept: GASTROENTEROLOGY | Facility: CLINIC | Age: 50
End: 2020-10-19
Payer: COMMERCIAL

## 2020-10-19 ENCOUNTER — HOSPITAL ENCOUNTER (OUTPATIENT)
Dept: ULTRASOUND IMAGING | Facility: CLINIC | Age: 50
Discharge: HOME | End: 2020-10-19
Attending: FAMILY MEDICINE | Admitting: FAMILY MEDICINE
Payer: COMMERCIAL

## 2020-10-19 DIAGNOSIS — N28.1 RENAL CYST: ICD-10-CM

## 2020-10-19 PROCEDURE — 76775 US EXAM ABDO BACK WALL LIM: CPT

## 2020-10-19 NOTE — ANESTHESIA PREPROCEDURE EVALUATION
Anesthesia Pre-Procedure Evaluation    Patient: Ciro Monet   MRN: [id number] : 1970          Preoperative Diagnosis: Special screening for malignant neoplasms, colon [Z12.11]    Procedure(s):  COLONOSCOPY    Past Medical History:   Diagnosis Date     Hiatal hernia      Morbid obesity with BMI of 45.0-49.9, adult (H)      CHARITY (obstructive sleep apnea) 2010      CPAP 15     Past Surgical History:   Procedure Laterality Date     HC REPAIR EPIGASTRIC HERNIA,REDUC  07     HC TOOTH EXTRACTION W/FORCEP       HEMORRHOID INJECTION SCLEROSING SOLUTION       HERNIORRHAPHY UMBILICAL N/A 10/29/2016    Procedure: HERNIORRHAPHY UMBILICAL;  Surgeon: Lori Barron MD;  Location:  OR     [facility]  ??       Anesthesia Evaluation     . Pt has had prior anesthetic. Type: General and MAC    No history of anesthetic complications          ROS/MED HX    ENT/Pulmonary:     (+)sleep apnea, uses CPAP , . .    Neurologic:  - neg neurologic ROS     Cardiovascular:     (+) Dyslipidemia, ----. : . . . :. . Previous cardiac testing date:results:date: results:ECG reviewed date:10/29/16 results:SR date: results:          METS/Exercise Tolerance:     Hematologic:  - neg hematologic  ROS       Musculoskeletal:  - neg musculoskeletal ROS       GI/Hepatic: Comment: Umbilical hernia  Increased risk of aspiration due to GERD    (+) GERD Asymptomatic on medication, hiatal hernia, bowel prep,       Renal/Genitourinary:      Chronic renal disease: renal cyst.   Endo:     (+) Obesity (morbid), .      Psychiatric:     (+) psychiatric history anxiety, bipolar, depression and other (comment) (schizoaffective disorder )      Infectious Disease:  - neg infectious disease ROS       Malignancy:      - no malignancy   Other:                          Physical Exam  Normal systems: cardiovascular, pulmonary and dental    Airway   Mallampati: II  TM distance: >3 FB  Neck ROM: full    Dental     Cardiovascular       Pulmonary  "            Lab Results   Component Value Date    WBC 8.2 11/03/2016    HGB 14.8 09/28/2020    HCT 37.9 (L) 11/03/2016     11/03/2016     09/28/2020    POTASSIUM 4.1 09/28/2020    CHLORIDE 102 09/28/2020    CO2 27 09/28/2020    BUN 18 09/28/2020    CR 0.91 09/28/2020    GLC 80 09/28/2020    MAURY 9.4 09/28/2020    MAG 2.2 10/30/2016    ALBUMIN 4.0 09/28/2020    PROTTOTAL 7.7 09/28/2020    ALT 35 09/28/2020    AST 27 09/28/2020    ALKPHOS 78 09/28/2020    BILITOTAL 0.3 09/28/2020    LIPASE 145 10/28/2016    TSH 1.61 10/06/2012    T4 0.91 03/11/2005       Preop Vitals  BP Readings from Last 3 Encounters:   09/28/20 136/78   09/18/20 130/84   09/11/20 (!) 173/96    Pulse Readings from Last 3 Encounters:   09/28/20 86   09/18/20 89   09/11/20 98      Resp Readings from Last 3 Encounters:   09/28/20 22   09/18/20 19   09/10/20 18    SpO2 Readings from Last 3 Encounters:   09/28/20 97%   09/18/20 95%   09/11/20 96%      Temp Readings from Last 1 Encounters:   09/28/20 36.5  C (97.7  F) (Tympanic)    Ht Readings from Last 1 Encounters:   09/18/20 1.854 m (6' 1\")      Wt Readings from Last 1 Encounters:   09/28/20 (!) 186.3 kg (410 lb 11.2 oz)    Estimated body mass index is 54.19 kg/m  as calculated from the following:    Height as of 9/18/20: 1.854 m (6' 1\").    Weight as of 9/28/20: 186.3 kg (410 lb 11.2 oz).       Anesthesia Plan      History & Physical Review  History and physical reviewed and following examination; no interval change.    ASA Status:  3 .    NPO Status:  > 4 hours    Plan for General and MAC Reason for MAC:  Deep or markedly invasive procedure (G8)           Postoperative Care      Consents  Anesthetic plan, risks, benefits and alternatives discussed with:  Patient..                 Brianna Langford APRN CRNA  "

## 2020-10-20 DIAGNOSIS — Z11.59 ENCOUNTER FOR SCREENING FOR OTHER VIRAL DISEASES: ICD-10-CM

## 2020-10-20 PROCEDURE — U0003 INFECTIOUS AGENT DETECTION BY NUCLEIC ACID (DNA OR RNA); SEVERE ACUTE RESPIRATORY SYNDROME CORONAVIRUS 2 (SARS-COV-2) (CORONAVIRUS DISEASE [COVID-19]), AMPLIFIED PROBE TECHNIQUE, MAKING USE OF HIGH THROUGHPUT TECHNOLOGIES AS DESCRIBED BY CMS-2020-01-R: HCPCS | Performed by: SURGERY

## 2020-10-21 ENCOUNTER — TELEPHONE (OUTPATIENT)
Dept: FAMILY MEDICINE | Facility: CLINIC | Age: 50
End: 2020-10-21

## 2020-10-21 DIAGNOSIS — R30.0 DYSURIA: Primary | ICD-10-CM

## 2020-10-21 LAB
SARS-COV-2 RNA SPEC QL NAA+PROBE: NOT DETECTED
SPECIMEN SOURCE: NORMAL

## 2020-10-21 NOTE — TELEPHONE ENCOUNTER
Ordered ua, and urology consult. He had psa elevaged, on first screen, had no symptoms but with this new symptoms I want urologies input.     A

## 2020-10-21 NOTE — TELEPHONE ENCOUNTER
Message left on patient's home answering machine to call the [facility] RN back.  Julio Velásquez RN

## 2020-10-21 NOTE — TELEPHONE ENCOUNTER
"Ciro reports that he noticed blood when he urinates; \"just a little bit.\" Never happened before. Denies lower abodominal pain.Denies fever. No new back or side pain. Colonoscopy on Friday.   "

## 2020-10-22 NOTE — TELEPHONE ENCOUNTER
Call placed to patient.  Relayed message per dalia Arguelles and consult placed.  Clinic RN call back number given.  Brianna Claudio RN

## 2020-10-23 ENCOUNTER — HOSPITAL ENCOUNTER (OUTPATIENT)
Facility: CLINIC | Age: 50
Discharge: HOME | End: 2020-10-23
Attending: SURGERY | Admitting: SURGERY
Payer: COMMERCIAL

## 2020-10-23 ENCOUNTER — ANESTHESIA (OUTPATIENT)
Dept: GASTROENTEROLOGY | Facility: CLINIC | Age: 50
End: 2020-10-23
Payer: COMMERCIAL

## 2020-10-23 VITALS
HEIGHT: 73 IN | TEMPERATURE: 98.4 F | OXYGEN SATURATION: 96 % | SYSTOLIC BLOOD PRESSURE: 116 MMHG | DIASTOLIC BLOOD PRESSURE: 80 MMHG | BODY MASS INDEX: 41.75 KG/M2 | WEIGHT: 315 LBS | RESPIRATION RATE: 16 BRPM | HEART RATE: 89 BPM

## 2020-10-23 LAB — COLONOSCOPY: NORMAL

## 2020-10-23 PROCEDURE — 45385 COLONOSCOPY W/LESION REMOVAL: CPT | Performed by: SURGERY

## 2020-10-23 PROCEDURE — 88305 TISSUE EXAM BY PATHOLOGIST: CPT | Mod: TC | Performed by: SURGERY

## 2020-10-23 PROCEDURE — 45385 COLONOSCOPY W/LESION REMOVAL: CPT | Mod: PT | Performed by: SURGERY

## 2020-10-23 PROCEDURE — 258N000003 HC RX IP 258 OP 636: Performed by: SURGERY

## 2020-10-23 PROCEDURE — 370N000002 HC ANESTHESIA TECHNICAL FEE, EACH ADDTL 15 MIN: Performed by: SURGERY

## 2020-10-23 PROCEDURE — 250N000009 HC RX 250: Performed by: NURSE ANESTHETIST, CERTIFIED REGISTERED

## 2020-10-23 PROCEDURE — 250N000009 HC RX 250: Performed by: SURGERY

## 2020-10-23 PROCEDURE — 370N000001 HC ANESTHESIA TECHNICAL FEE, 1ST 30 MIN: Performed by: SURGERY

## 2020-10-23 PROCEDURE — 88305 TISSUE EXAM BY PATHOLOGIST: CPT | Mod: 26 | Performed by: PATHOLOGY

## 2020-10-23 PROCEDURE — 250N000011 HC RX IP 250 OP 636: Performed by: NURSE ANESTHETIST, CERTIFIED REGISTERED

## 2020-10-23 RX ORDER — SODIUM CHLORIDE, SODIUM LACTATE, POTASSIUM CHLORIDE, CALCIUM CHLORIDE 600; 310; 30; 20 MG/100ML; MG/100ML; MG/100ML; MG/100ML
INJECTION, SOLUTION INTRAVENOUS CONTINUOUS
Status: DISCONTINUED | OUTPATIENT
Start: 2020-10-23 | End: 2020-10-23 | Stop reason: HOSPADM

## 2020-10-23 RX ORDER — LIDOCAINE HYDROCHLORIDE 10 MG/ML
INJECTION, SOLUTION INFILTRATION; PERINEURAL PRN
Status: DISCONTINUED | OUTPATIENT
Start: 2020-10-23 | End: 2020-10-23

## 2020-10-23 RX ORDER — GLYCOPYRROLATE 0.2 MG/ML
INJECTION, SOLUTION INTRAMUSCULAR; INTRAVENOUS PRN
Status: DISCONTINUED | OUTPATIENT
Start: 2020-10-23 | End: 2020-10-23

## 2020-10-23 RX ORDER — PROPOFOL 10 MG/ML
INJECTION, EMULSION INTRAVENOUS CONTINUOUS PRN
Status: DISCONTINUED | OUTPATIENT
Start: 2020-10-23 | End: 2020-10-23

## 2020-10-23 RX ORDER — LIDOCAINE 40 MG/G
CREAM TOPICAL
Status: DISCONTINUED | OUTPATIENT
Start: 2020-10-23 | End: 2020-10-23 | Stop reason: HOSPADM

## 2020-10-23 RX ORDER — ONDANSETRON 2 MG/ML
4 INJECTION INTRAMUSCULAR; INTRAVENOUS
Status: DISCONTINUED | OUTPATIENT
Start: 2020-10-23 | End: 2020-10-23 | Stop reason: HOSPADM

## 2020-10-23 RX ORDER — PROPOFOL 10 MG/ML
INJECTION, EMULSION INTRAVENOUS PRN
Status: DISCONTINUED | OUTPATIENT
Start: 2020-10-23 | End: 2020-10-23

## 2020-10-23 RX ADMIN — PROPOFOL 50 MG: 10 INJECTION, EMULSION INTRAVENOUS at 08:57

## 2020-10-23 RX ADMIN — PROPOFOL 200 MCG/KG/MIN: 10 INJECTION, EMULSION INTRAVENOUS at 08:55

## 2020-10-23 RX ADMIN — LIDOCAINE HYDROCHLORIDE 50 MG: 10 INJECTION, SOLUTION INFILTRATION; PERINEURAL at 08:55

## 2020-10-23 RX ADMIN — PROPOFOL 100 MG: 10 INJECTION, EMULSION INTRAVENOUS at 08:55

## 2020-10-23 RX ADMIN — GLYCOPYRROLATE 0.2 MG: 0.2 INJECTION, SOLUTION INTRAMUSCULAR; INTRAVENOUS at 08:55

## 2020-10-23 RX ADMIN — LIDOCAINE HYDROCHLORIDE: 10 INJECTION, SOLUTION EPIDURAL; INFILTRATION; INTRACAUDAL; PERINEURAL at 08:09

## 2020-10-23 RX ADMIN — SODIUM CHLORIDE, POTASSIUM CHLORIDE, SODIUM LACTATE AND CALCIUM CHLORIDE: 600; 310; 30; 20 INJECTION, SOLUTION INTRAVENOUS at 08:10

## 2020-10-23 ASSESSMENT — MIFFLIN-ST. JEOR: SCORE: 2773.63

## 2020-10-23 NOTE — ANESTHESIA POSTPROCEDURE EVALUATION
Patient: Ciro Monet    Procedure(s):  COLONOSCOPY, FLEXIBLE, WITH LESION REMOVAL USING SNARE    Diagnosis:Special screening for malignant neoplasms, colon [Z12.11]  Diagnosis Additional Information: No value filed.    Anesthesia Type:  No value filed.    Note:  Anesthesia Post Evaluation    Patient location during evaluation: Phase 2 and Bedside  Patient participation: Able to fully participate in evaluation  Level of consciousness: awake and alert  Pain management: adequate  Airway patency: patent  Cardiovascular status: acceptable  Respiratory status: acceptable  Hydration status: acceptable  PONV: none     Anesthetic complications: None          Last vitals:  Vitals:    10/23/20 0938 10/23/20 0945 10/23/20 1000   BP: 121/84 116/80    Pulse: 80 89    Resp: 16 16    Temp:      SpO2: (!) 88% 93% 96%         Electronically Signed By: MARIA INES Donis CRNA  October 23, 2020  11:02 AM

## 2020-10-23 NOTE — ANESTHESIA CARE TRANSFER NOTE
Patient: Ciro Monet    Procedure(s):  COLONOSCOPY, FLEXIBLE, WITH LESION REMOVAL USING SNARE    Diagnosis: Special screening for malignant neoplasms, colon [Z12.11]  Diagnosis Additional Information: No value filed.    Anesthesia Type:   No value filed.     Note:  Airway :Nasal Cannula  Patient transferred to:Phase II  Comments: Patient's VSS. Spontaneous respirations. Patient awake and oriented. IV patent. Report to RN.Handoff Report: Identifed the Patient, Identified the Reponsible Provider, Reviewed the pertinent medical history, Discussed the surgical course, Reviewed Intra-OP anesthesia mangement and issues during anesthesia, Set expectations for post-procedure period and Allowed opportunity for questions and acknowledgement of understanding      Vitals: (Last set prior to Anesthesia Care Transfer)    CRNA VITALS  10/23/2020 0901 - 10/23/2020 0931      10/23/2020             Resp Rate (observed):  22    EKG:  NSR                Electronically Signed By: MARIA INES Donis CRNA  October 23, 2020  9:31 AM

## 2020-10-23 NOTE — BRIEF OP NOTE
[facility]    Brief Operative Note    Pre-operative diagnosis: Special screening for malignant neoplasms, colon [Z12.11]   Post-operative diagnosis diverticulosis, two polyps     Procedure: Procedure(s):  COLONOSCOPY, FLEXIBLE, WITH LESION REMOVAL USING SNARE   Surgeon(s): Surgeon(s) and Role:     * Mervin Landry MD - Primary   Estimated blood loss: * No values recorded between 10/23/2020  8:59 AM and 10/23/2020  9:33 AM *    Specimens: ID Type Source Tests Collected by Time Destination   A :  Polyp Large Intestine, Cecum SURGICAL PATHOLOGY EXAM Mervin Landry MD 10/23/2020  9:27 AM    B : 25cm Polyp Colon SURGICAL PATHOLOGY EXAM Mervin Landry MD 10/23/2020  9:28 AM       Findings: 1. Sigmoid diverticulosis - moderate  2. Two polyps as outlined  3. Small thrombosed hemorrhoid

## 2020-10-23 NOTE — H&P
50 year old year old male here for colonoscopy for screening.    Patient Active Problem List   Diagnosis     Moderate depressed bipolar I disorder (H)     Esophageal reflux     CHARITY (Obstructive Sleep Apnea)-severe ()     HYPERLIPIDEMIA LDL GOAL <160     Morbid obesity (H)     Localized superficial swelling, mass, or lump     Renal cyst needs ct 6/15     Incarcerated hernia     Umbilical hernia, incarcerated     Schizoaffective disorder, chronic condition (H)       Past Medical History:   Diagnosis Date     Hiatal hernia      Morbid obesity with BMI of 45.0-49.9, adult (H)      CHARITY (obstructive sleep apnea) 4/2010      CPAP 15       Past Surgical History:   Procedure Laterality Date     HC REPAIR EPIGASTRIC HERNIA,REDUC  11/23/07     HC TOOTH EXTRACTION W/FORCEP       HEMORRHOID INJECTION SCLEROSING SOLUTION       HERNIORRHAPHY UMBILICAL N/A 10/29/2016    Procedure: HERNIORRHAPHY UMBILICAL;  Surgeon: Lori Barron MD;  Location:  OR     LASIK  ?2005?       @[employer]@    No current outpatient medications on file.       Allergies   Allergen Reactions     Adhesive Tape Rash     3M Plastic adhesive transpore tape per dad. Blisters and rash.     Pine Swelling and Difficulty breathing     Eyes swelling & difficultly breathing.       Pt reports that he has never smoked. He has never used smokeless tobacco. He reports that he does not drink alcohol or use drugs.    Exam:  There were no vitals taken for this visit.    Awake, Alert OX3  Lungs - CTA bilaterally  CV - RRR, no murmurs, distal pulses intact  Abd - soft, non-distended, non-tender, +BS  Extr - No cyanosis or edema    A/P 50 year old year old male in need of colonoscopy for screening. Risks, benefits, alternatives, and complications were discussed including the possibility of perforation and the patient agreed to proceed    Mervin Landry MD

## 2020-10-25 ENCOUNTER — HOSPITAL ENCOUNTER (OUTPATIENT)
Dept: LAB | Facility: CLINIC | Age: 50
End: 2020-10-25
Attending: FAMILY MEDICINE
Payer: COMMERCIAL

## 2020-10-26 LAB — COPATH REPORT: NORMAL

## 2020-11-16 ENCOUNTER — VIRTUAL VISIT (OUTPATIENT)
Dept: UROLOGY | Facility: CLINIC | Age: 50
End: 2020-11-16
Payer: COMMERCIAL

## 2020-11-16 DIAGNOSIS — R97.20 ELEVATED PROSTATE SPECIFIC ANTIGEN (PSA): Primary | ICD-10-CM

## 2020-11-16 PROCEDURE — 99201 PR OFFICE/OUTPT VISIT, NEW, LEVEL I: CPT | Mod: TEL | Performed by: UROLOGY

## 2020-11-16 NOTE — PROGRESS NOTES
Telephone visit    50-year-old male with a prostate-specific antigen of 4.38 ng/mL.  This is his first PSA.    Also reports one episode of gross hematuria in September.    Has also had some difficulty with hemorrhoids but is confident that the blood he saw was from his urine.    It was a single episode of hematuria with no recurrences.    He does not smoke and he has no family history of prostate cancer.    Impression: Elevated PSA and history of one episode of gross hematuria.    Plan: We will schedule him for cystoscopy now and he will repeat his PSA in January or February.    Total time 10 minutes

## 2020-11-18 ENCOUNTER — TELEPHONE (OUTPATIENT)
Dept: UROLOGY | Facility: CLINIC | Age: 50
End: 2020-11-18

## 2020-11-18 NOTE — TELEPHONE ENCOUNTER
Pt needs to be scheduled for a cystoscopy in clinic in the next few weeks per Dr. Rosario.    Pt can be scheduled 12/07 @ 11am.    Aaliyah KAY CMA

## 2020-11-18 NOTE — TELEPHONE ENCOUNTER
----- Message from GREG Rosario MD sent at 11/16/2020  3:33 PM CST -----  Regarding: Needs cystoscopy in the next couple weeks (gross hematuria)

## 2020-12-07 ENCOUNTER — OFFICE VISIT (OUTPATIENT)
Dept: UROLOGY | Facility: CLINIC | Age: 50
End: 2020-12-07
Payer: COMMERCIAL

## 2020-12-07 VITALS — RESPIRATION RATE: 20 BRPM | SYSTOLIC BLOOD PRESSURE: 157 MMHG | DIASTOLIC BLOOD PRESSURE: 101 MMHG | HEART RATE: 85 BPM

## 2020-12-07 DIAGNOSIS — N30.01 ACUTE CYSTITIS WITH HEMATURIA: Primary | ICD-10-CM

## 2020-12-07 LAB
ALBUMIN UR-MCNC: NEGATIVE MG/DL
APPEARANCE UR: CLEAR
BILIRUB UR QL STRIP: NEGATIVE
COLOR UR AUTO: YELLOW
GLUCOSE UR STRIP-MCNC: NEGATIVE MG/DL
HGB UR QL STRIP: NEGATIVE
KETONES UR STRIP-MCNC: NEGATIVE MG/DL
LEUKOCYTE ESTERASE UR QL STRIP: NEGATIVE
NITRATE UR QL: NEGATIVE
PH UR STRIP: 5.5 PH (ref 5–7)
RBC #/AREA URNS AUTO: NORMAL /HPF
SOURCE: NORMAL
SP GR UR STRIP: 1.01 (ref 1–1.03)
UROBILINOGEN UR STRIP-ACNC: 0.2 EU/DL (ref 0.2–1)
WBC #/AREA URNS AUTO: NORMAL /HPF

## 2020-12-07 PROCEDURE — 51798 US URINE CAPACITY MEASURE: CPT | Performed by: UROLOGY

## 2020-12-07 PROCEDURE — 52000 CYSTOURETHROSCOPY: CPT | Performed by: UROLOGY

## 2020-12-07 PROCEDURE — 81001 URINALYSIS AUTO W/SCOPE: CPT | Performed by: UROLOGY

## 2020-12-07 PROCEDURE — 87086 URINE CULTURE/COLONY COUNT: CPT | Performed by: UROLOGY

## 2020-12-07 NOTE — NURSING NOTE
Pt brought back to the procedure room for a cystoscopy per Dr. Rosario. Informed consent obtained, pt prepped in a sterile manner and a uro jet was used.      [facility] Single-Use Flexible Cystoscope    EXP: 06/30/2023   LOT: [id number]    Aaliyah KAY CMA

## 2020-12-07 NOTE — NURSING NOTE
"Chief Complaint   Patient presents with     Cystoscopy     Hematuria       Initial BP (!) 157/101 (BP Location: Right arm, Patient Position: Chair, Cuff Size: Adult Large)   Pulse 85   Resp 20  Estimated body mass index is 54.09 kg/m  as calculated from the following:    Height as of 10/23/20: 1.854 m (6' 1\").    Weight as of 10/23/20: 186 kg (410 lb).  BP completed using cuff size: large   Medications and allergies reviewed.    Urology Review    Urine Frequency   Yes   Urine Urgency   Sometimes    Urine Incontinence    No   Nocturia    No   Hematuria   Yes, x1    Retention/Incomplete Bladder Emptying   No PVR: 26ml    BPH   No   Dysuria   No   Hx of UTI   No   Bladder Spasms   No   Testicular Pain/Swelling   Yes   Elevated PSA   Yes    Family Hx of Prostate Ca/Bladder Ca    No   Other    Yes: Cyst on kidney          Aaliyah KAY CMA     "

## 2020-12-07 NOTE — PROGRESS NOTES
Appointment source: New Patient  Patient name: Ciro Monet  Urology Staff: Rishabh Rosario MD    Subjective: This is a 50 year old year old male complaining of one episode of gross hematuria.    He has previously been seen in urology for evaluation of a left renal cyst.  A recent renal ultrasound revealed that the cyst was unchanged in size.    Since his initial visit regarding his elevated PSA and gross hematuria he reports no additional episodes of gross hematuria.    He denies any voiding problems whatsoever.    He was initially seen for a mildly elevated PSA of 4.38 ng/mL obtained in September of this year.    A repeat PSA has been ordered but has not yet been done.    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    Objective: Obese male in no acute distress.    External genitalia are unremarkable.    Rectal examination was essentially normal but limited by body habitus.    Cystourethroscopy was performed for further evaluation of the gross hematuria.    The urethra and bladder were normal in appearance with no evidence of any neoplasm as a source for his hematuria.    Urinalysis today was completely normal.    Assessment: Elevated PSA (currently being monitored) and an episode of gross hematuria without evidence of neoplasm.    Plan: The ultrasound of the kidney should suffice at this time for the evaluation of his hematuria.  If he does have recurrence of the gross hematuria a contrast CAT scan of the abdomen and pelvis will be ordered.    I will review his repeat PSA when it is available.  We will call him and remind him of this study and of the importance to return should additional hematuria be identified.

## 2020-12-08 LAB
BACTERIA SPEC CULT: NORMAL
Lab: NORMAL
SPECIMEN SOURCE: NORMAL

## 2021-02-15 DIAGNOSIS — R97.20 ELEVATED PROSTATE SPECIFIC ANTIGEN (PSA): ICD-10-CM

## 2021-02-15 LAB — PSA SERPL-ACNC: 4.37 UG/L (ref 0–4)

## 2021-02-15 PROCEDURE — 36415 COLL VENOUS BLD VENIPUNCTURE: CPT | Performed by: UROLOGY

## 2021-02-15 PROCEDURE — G0103 PSA SCREENING: HCPCS | Performed by: UROLOGY

## 2021-02-16 DIAGNOSIS — R97.20 ELEVATED PROSTATE SPECIFIC ANTIGEN (PSA): Primary | ICD-10-CM

## 2021-02-16 NOTE — PROGRESS NOTES
Left a message on the patient's phone to schedule an MRI of the prostate view of his continuously elevated PSA.

## 2021-04-01 ENCOUNTER — ANCILLARY PROCEDURE (OUTPATIENT)
Dept: MRI IMAGING | Facility: CLINIC | Age: 51
End: 2021-04-01
Attending: UROLOGY
Payer: COMMERCIAL

## 2021-04-01 DIAGNOSIS — R97.20 ELEVATED PROSTATE SPECIFIC ANTIGEN (PSA): ICD-10-CM

## 2021-04-01 PROCEDURE — A9585 GADOBUTROL INJECTION: HCPCS | Performed by: RADIOLOGY

## 2021-04-01 PROCEDURE — 72197 MRI PELVIS W/O & W/DYE: CPT | Performed by: RADIOLOGY

## 2021-04-01 RX ORDER — GADOBUTROL 604.72 MG/ML
15 INJECTION INTRAVENOUS ONCE
Status: COMPLETED | OUTPATIENT
Start: 2021-04-01 | End: 2021-04-01

## 2021-04-01 RX ADMIN — GADOBUTROL 15 ML: 604.72 INJECTION INTRAVENOUS at 16:45

## 2021-04-01 NOTE — DISCHARGE INSTRUCTIONS
MRI Contrast Discharge Instructions    The IV contrast you received today will pass out of your body in your  urine. This will happen in the next 24 hours. You will not feel this process.  Your urine will not change color.    Drink at least 4 extra glasses of water or juice today (unless your doctor  has restricted your fluids). This reduces the stress on your kidneys.  You may take your regular medicines.    If you are on dialysis: It is best to have dialysis today.    If you have a reaction: Most reactions happen right away. If you have  any new symptoms after leaving the hospital (such as hives or swelling),  call your hospital at the correct number below. Or call your family doctor.  If you have breathing distress or wheezing, call 911.    Special instructions: ***    I have read and understand the above information.    Signature:______________________________________ Date:___________    Staff:__________________________________________ Date:___________     Time:__________    [facility] Departments:    ___[place]: [phone]  ___[facility]: [phone]  ___[place]: [phone] ___[facility]: [phone]  ___[facility]: [phone]  ___[facility]: [phone]  ___[phone]  ___[facility]: [phone]  ___[phone]

## 2021-05-05 ENCOUNTER — OFFICE VISIT (OUTPATIENT)
Dept: FAMILY MEDICINE | Facility: CLINIC | Age: 51
End: 2021-05-05
Payer: COMMERCIAL

## 2021-05-05 VITALS
HEART RATE: 95 BPM | HEIGHT: 73 IN | WEIGHT: 315 LBS | RESPIRATION RATE: 22 BRPM | BODY MASS INDEX: 41.75 KG/M2 | SYSTOLIC BLOOD PRESSURE: 126 MMHG | TEMPERATURE: 99.2 F | DIASTOLIC BLOOD PRESSURE: 85 MMHG

## 2021-05-05 DIAGNOSIS — R73.09 ELEVATED GLUCOSE: ICD-10-CM

## 2021-05-05 DIAGNOSIS — L03.116 CELLULITIS OF LEFT LOWER EXTREMITY: Primary | ICD-10-CM

## 2021-05-05 LAB — HBA1C MFR BLD: 5.7 % (ref 0–5.6)

## 2021-05-05 PROCEDURE — 99213 OFFICE O/P EST LOW 20 MIN: CPT | Performed by: FAMILY MEDICINE

## 2021-05-05 PROCEDURE — 83036 HEMOGLOBIN GLYCOSYLATED A1C: CPT | Performed by: FAMILY MEDICINE

## 2021-05-05 PROCEDURE — 36415 COLL VENOUS BLD VENIPUNCTURE: CPT | Performed by: FAMILY MEDICINE

## 2021-05-05 RX ORDER — CEPHALEXIN 500 MG/1
500 CAPSULE ORAL 4 TIMES DAILY
Qty: 28 CAPSULE | Refills: 0 | Status: SHIPPED | OUTPATIENT
Start: 2021-05-05 | End: 2021-09-29

## 2021-05-05 ASSESSMENT — MIFFLIN-ST. JEOR: SCORE: 2782.7

## 2021-05-05 ASSESSMENT — PAIN SCALES - GENERAL: PAINLEVEL: NO PAIN (0)
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
## 2021-05-25 ENCOUNTER — HOSPITAL ENCOUNTER (OUTPATIENT)
Dept: WOUND CARE | Facility: CLINIC | Age: 51
Discharge: HOME | End: 2021-05-25
Attending: SURGERY | Admitting: SURGERY
Payer: COMMERCIAL

## 2021-05-25 DIAGNOSIS — L03.116 CELLULITIS OF LEFT LOWER EXTREMITY: ICD-10-CM

## 2021-05-25 PROCEDURE — 29581 APPL MULTLAYER CMPRN SYS LEG: CPT

## 2021-05-25 PROCEDURE — G0463 HOSPITAL OUTPT CLINIC VISIT: HCPCS | Mod: 25
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
## 2021-05-25 NOTE — DISCHARGE INSTRUCTIONS
Leave Coflex wrap in place until follow-up appointment on Tuesday.     Don t get your Coflex wrap wet. Cover the wrap completely with a plastic bag or wrap before taking a shower.     Remove your Coflex wrap ONLY IF you experience one or more of the following:  - New tingling or numbness in leg or foot  - Significant pain that cannot be relieved  - A fever of 100.4 F (38 C) or higher  - New coldness, or blue-gray color in the toes    If you need to remove your wrap you can leave the bandage in place over the wound (unless it is leaking drainage, then you should remove it).      Please call with any questions or concerns    Maribell Oh RN, CWOCN  [phone]

## 2021-06-01 ENCOUNTER — HOSPITAL ENCOUNTER (OUTPATIENT)
Dept: WOUND CARE | Facility: CLINIC | Age: 51
Discharge: HOME | End: 2021-06-01
Attending: FAMILY MEDICINE | Admitting: FAMILY MEDICINE
Payer: COMMERCIAL

## 2021-06-01 PROCEDURE — 29581 APPL MULTLAYER CMPRN SYS LEG: CPT
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
## 2021-06-08 ENCOUNTER — HOSPITAL ENCOUNTER (OUTPATIENT)
Dept: WOUND CARE | Facility: CLINIC | Age: 51
Discharge: HOME | End: 2021-06-08
Attending: SURGERY | Admitting: SURGERY
Payer: COMMERCIAL

## 2021-06-08 PROCEDURE — 29581 APPL MULTLAYER CMPRN SYS LEG: CPT | Mod: LT
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
## 2021-06-15 ENCOUNTER — HOSPITAL ENCOUNTER (OUTPATIENT)
Dept: WOUND CARE | Facility: CLINIC | Age: 51
Discharge: HOME | End: 2021-06-15
Attending: SURGERY | Admitting: SURGERY
Payer: COMMERCIAL

## 2021-06-15 DIAGNOSIS — R60.0 VENOUS STASIS ULCER OF LEFT LOWER LEG WITH EDEMA OF LEFT LOWER LEG (H): Primary | ICD-10-CM

## 2021-06-15 DIAGNOSIS — L97.929 VENOUS STASIS ULCER OF LEFT LOWER LEG WITH EDEMA OF LEFT LOWER LEG (H): Primary | ICD-10-CM

## 2021-06-15 DIAGNOSIS — I83.892 VENOUS STASIS ULCER OF LEFT LOWER LEG WITH EDEMA OF LEFT LOWER LEG (H): Primary | ICD-10-CM

## 2021-06-15 DIAGNOSIS — I83.029 VENOUS STASIS ULCER OF LEFT LOWER LEG WITH EDEMA OF LEFT LOWER LEG (H): Primary | ICD-10-CM

## 2021-06-15 PROCEDURE — 29581 APPL MULTLAYER CMPRN SYS LEG: CPT
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
## 2021-06-23 ENCOUNTER — HOSPITAL ENCOUNTER (OUTPATIENT)
Dept: WOUND CARE | Facility: CLINIC | Age: 51
Discharge: HOME | End: 2021-06-23
Attending: SURGERY | Admitting: SURGERY
Payer: COMMERCIAL

## 2021-06-23 PROCEDURE — 29581 APPL MULTLAYER CMPRN SYS LEG: CPT
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
## 2021-06-29 ENCOUNTER — HOSPITAL ENCOUNTER (OUTPATIENT)
Dept: WOUND CARE | Facility: CLINIC | Age: 51
Discharge: HOME | End: 2021-06-29
Attending: SURGERY | Admitting: SURGERY
Payer: COMMERCIAL

## 2021-06-29 PROCEDURE — G0463 HOSPITAL OUTPT CLINIC VISIT: HCPCS

## 2021-06-29 NOTE — DISCHARGE INSTRUCTIONS
Left leg Dressing Change  Wash your hands before and after the dressing change.     1.) Cleanse wound with spray wound cleanser and wipe with gauze to dry well  2.) apply a small amount of gel to wound using Q tip  3.) Cover with Mepilex border 3x3 bandage provided, or alternate over the counter bandage of your choice (waterproof if leaving for days at a time, if not waterproof then change daily with shower)     Change dressing every 1-4 days depending on the drainage (goal is to not get too wet/soggy looking but have a small amount of moisture in wound, if it seems too wet then stop using the gel).    Wear the Spandagrip size F tubular support garment that was provided in a double layer as shown, this should be applied first thing in the morning and removed at night.  Must wear this daily to prevent wound from worsening and enable healing.    Expect to receive a compression wrap in the mail (I will call you as soon as I hear about this).  Contact me if you need help learning how to use this.  I expect you need to always wear this, (during the day only) to prevent new wounds.  You will need to replace this every 6-12 months as it wears out.    Follow up as needed    Signs and symptoms of infection:  Bright green drainage  Foul odor  Increasing pain in area  New redness or swelling at the site of the wound or streaks up from wound  New warmth to touch around wound accompanied by redness and swelling  Fever    Need to be seen as soon as possible for infection concerns.    Please call with any questions or concerns.    Maribell Oh RN, CWOCN   [phone]
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
## 2021-08-18 ENCOUNTER — TRANSFERRED RECORDS (OUTPATIENT)
Dept: HEALTH INFORMATION MANAGEMENT | Facility: CLINIC | Age: 51
End: 2021-08-18

## 2021-09-05 ENCOUNTER — HEALTH MAINTENANCE LETTER (OUTPATIENT)
Age: 51
End: 2021-09-05

## 2021-09-29 ENCOUNTER — OFFICE VISIT (OUTPATIENT)
Dept: FAMILY MEDICINE | Facility: CLINIC | Age: 51
End: 2021-09-29
Payer: COMMERCIAL

## 2021-09-29 VITALS
TEMPERATURE: 98.7 F | HEART RATE: 90 BPM | WEIGHT: 315 LBS | SYSTOLIC BLOOD PRESSURE: 133 MMHG | BODY MASS INDEX: 55.02 KG/M2 | DIASTOLIC BLOOD PRESSURE: 84 MMHG

## 2021-09-29 DIAGNOSIS — F25.9 SCHIZOAFFECTIVE DISORDER, CHRONIC CONDITION (H): ICD-10-CM

## 2021-09-29 DIAGNOSIS — Z12.5 SCREENING FOR PROSTATE CANCER: ICD-10-CM

## 2021-09-29 DIAGNOSIS — G47.33 OBSTRUCTIVE SLEEP APNEA SYNDROME: ICD-10-CM

## 2021-09-29 DIAGNOSIS — E66.813 CLASS 3 SEVERE OBESITY DUE TO EXCESS CALORIES WITH SERIOUS COMORBIDITY AND BODY MASS INDEX (BMI) OF 50.0 TO 59.9 IN ADULT (H): ICD-10-CM

## 2021-09-29 DIAGNOSIS — Z23 NEED FOR PROPHYLACTIC VACCINATION AND INOCULATION AGAINST INFLUENZA: Primary | ICD-10-CM

## 2021-09-29 DIAGNOSIS — E66.01 CLASS 3 SEVERE OBESITY DUE TO EXCESS CALORIES WITH SERIOUS COMORBIDITY AND BODY MASS INDEX (BMI) OF 50.0 TO 59.9 IN ADULT (H): ICD-10-CM

## 2021-09-29 DIAGNOSIS — R97.20 ELEVATED PROSTATE SPECIFIC ANTIGEN (PSA): ICD-10-CM

## 2021-09-29 LAB
ALBUMIN SERPL-MCNC: 3.8 G/DL (ref 3.4–5)
ALP SERPL-CCNC: 81 U/L (ref 40–150)
ALT SERPL W P-5'-P-CCNC: 29 U/L (ref 0–70)
ANION GAP SERPL CALCULATED.3IONS-SCNC: 8 MMOL/L (ref 3–14)
AST SERPL W P-5'-P-CCNC: 29 U/L (ref 0–45)
BILIRUB SERPL-MCNC: 0.7 MG/DL (ref 0.2–1.3)
BUN SERPL-MCNC: 16 MG/DL (ref 7–30)
CALCIUM SERPL-MCNC: 8.7 MG/DL (ref 8.5–10.1)
CHLORIDE BLD-SCNC: 106 MMOL/L (ref 94–109)
CO2 SERPL-SCNC: 22 MMOL/L (ref 20–32)
CREAT SERPL-MCNC: 0.76 MG/DL (ref 0.66–1.25)
ERYTHROCYTE [DISTWIDTH] IN BLOOD BY AUTOMATED COUNT: 13 % (ref 10–15)
GFR SERPL CREATININE-BSD FRML MDRD: >90 ML/MIN/1.73M2
GLUCOSE BLD-MCNC: 76 MG/DL (ref 70–99)
HCT VFR BLD AUTO: 41.9 % (ref 40–53)
HGB BLD-MCNC: 14.3 G/DL (ref 13.3–17.7)
MCH RBC QN AUTO: 31 PG (ref 26.5–33)
MCHC RBC AUTO-ENTMCNC: 34.1 G/DL (ref 31.5–36.5)
MCV RBC AUTO: 91 FL (ref 78–100)
PLATELET # BLD AUTO: 224 10E3/UL (ref 150–450)
POTASSIUM BLD-SCNC: 4 MMOL/L (ref 3.4–5.3)
PROT SERPL-MCNC: 7.5 G/DL (ref 6.8–8.8)
PSA SERPL-MCNC: 4.28 UG/L (ref 0–4)
RBC # BLD AUTO: 4.62 10E6/UL (ref 4.4–5.9)
SODIUM SERPL-SCNC: 136 MMOL/L (ref 133–144)
WBC # BLD AUTO: 8.9 10E3/UL (ref 4–11)

## 2021-09-29 PROCEDURE — 85027 COMPLETE CBC AUTOMATED: CPT | Performed by: FAMILY MEDICINE

## 2021-09-29 PROCEDURE — 80053 COMPREHEN METABOLIC PANEL: CPT | Performed by: FAMILY MEDICINE

## 2021-09-29 PROCEDURE — G0103 PSA SCREENING: HCPCS | Performed by: FAMILY MEDICINE

## 2021-09-29 PROCEDURE — 36415 COLL VENOUS BLD VENIPUNCTURE: CPT | Performed by: FAMILY MEDICINE

## 2021-09-29 PROCEDURE — 90682 RIV4 VACC RECOMBINANT DNA IM: CPT | Performed by: FAMILY MEDICINE

## 2021-09-29 PROCEDURE — 99396 PREV VISIT EST AGE 40-64: CPT | Mod: 25 | Performed by: FAMILY MEDICINE

## 2021-09-29 PROCEDURE — 90471 IMMUNIZATION ADMIN: CPT | Performed by: FAMILY MEDICINE

## 2021-09-29 ASSESSMENT — ENCOUNTER SYMPTOMS
PARESTHESIAS: 0
HEADACHES: 0
FEVER: 0
DYSURIA: 0
ARTHRALGIAS: 1
COUGH: 0
NAUSEA: 0
SHORTNESS OF BREATH: 0
SORE THROAT: 0
JOINT SWELLING: 0
NERVOUS/ANXIOUS: 0
FREQUENCY: 0
EYE PAIN: 0
DIARRHEA: 0
DIZZINESS: 0
HEARTBURN: 0
ABDOMINAL PAIN: 0
HEMATOCHEZIA: 0
CHILLS: 0
PALPITATIONS: 0
WEAKNESS: 0
HEMATURIA: 0
CONSTIPATION: 0
MYALGIAS: 1

## 2021-09-29 NOTE — PROGRESS NOTES
SUBJECTIVE:   CC: Ciro Monet is an 51 year old male who presents for preventative health visit.     Chief Complaint   Patient presents with     Physical     Blood Draw     Fasting for labs      Imm/Inj     Flu Shot         Wt Readings from Last 10 Encounters:   09/29/21 (!) 189.1 kg (417 lb)   05/05/21 (!) 186.9 kg (412 lb)   10/23/20 (!) 186 kg (410 lb)   09/28/20 (!) 186.3 kg (410 lb 11.2 oz)   09/18/20 (!) 184.3 kg (406 lb 6.4 oz)   09/11/20 (!) 181.4 kg (400 lb)   09/10/20 (!) 181.4 kg (400 lb)   09/08/20 (!) 181.4 kg (400 lb)   08/05/20 (!) 181.4 kg (400 lb)   05/18/20 (!) 181.4 kg (400 lb)       Patient has been advised of split billing requirements and indicates understanding: Yes  Healthy Habits:     Getting at least 3 servings of Calcium per day:  Yes    Bi-annual eye exam:  Yes    Dental care twice a year:  Yes    Sleep apnea or symptoms of sleep apnea:  Sleep apnea    Diet:  Regular (no restrictions)    Frequency of exercise:  1 day/week    Duration of exercise:  15-30 minutes    Taking medications regularly:  No    Medication side effects:  Muscle aches    PHQ-2 Total Score: 0    Additional concerns today:  No            Today's PHQ-2 Score:   PHQ-2 ( 1999 Pf[employer]) 9/29/2021   Q1: Little interest or pleasure in doing things 0   Q2: Feeling down, depressed or hopeless 0   PHQ-2 Score 0   Q1: Little interest or pleasure in doing things Not at all   Q2: Feeling down, depressed or hopeless Not at all   PHQ-2 Score 0       Abuse: Current or Past(Physical, Sexual or Emotional)- No  Do you feel safe in your environment? Yes    Have you ever done Advance Care Planning? (For example, a Health Directive, POLST, or a discussion with a medical provider or your loved ones about your wishes): No, advance care planning information given to patient to review.  Patient declined advance care planning discussion at this time.    Social History     Tobacco Use     Smoking status: Never Smoker     Smokeless tobacco: Never  Used   Substance Use Topics     Alcohol use: No     Alcohol/week: 0.0 standard drinks     If you drink alcohol do you typically have >3 drinks per day or >7 drinks per week? Not applicable    Alcohol Use 9/29/2021   Prescreen: >3 drinks/day or >7 drinks/week? Not Applicable   Prescreen: >3 drinks/day or >7 drinks/week? -       Last PSA:   PSA   Date Value Ref Range Status   02/15/2021 4.37 (H) 0 - 4 ug/L Final     Comment:     Assay Method:  Chemiluminescence using Siemens Vista analyzer       Reviewed orders with patient. Reviewed health maintenance and updated orders accordingly - Yes  BP Readings from Last 3 Encounters:   09/29/21 133/84   05/05/21 126/85   12/07/20 (!) 157/101    Wt Readings from Last 3 Encounters:   09/29/21 (!) 189.1 kg (417 lb)   05/05/21 (!) 186.9 kg (412 lb)   10/23/20 (!) 186 kg (410 lb)                  Patient Active Problem List   Diagnosis     Moderate depressed bipolar I disorder (H)     Esophageal reflux     CHARITY (Obstructive Sleep Apnea)-severe ()     HYPERLIPIDEMIA LDL GOAL <160     Morbid obesity (H)     Localized superficial swelling, mass, or lump     Renal cyst needs ct 6/15     Incarcerated hernia     Umbilical hernia, incarcerated     Schizoaffective disorder, chronic condition (H)     Past Surgical History:   Procedure Laterality Date     HC REPAIR EPIGASTRIC HERNIA,REDUC  11/23/07     HC TOOTH EXTRACTION W/FORCEP       HEMORRHOID INJECTION SCLEROSING SOLUTION       HERNIORRHAPHY UMBILICAL N/A 10/29/2016    Procedure: HERNIORRHAPHY UMBILICAL;  Surgeon: Lori Barron MD;  Location:  OR     [facility]  ?2005?       Social History     Tobacco Use     Smoking status: Never Smoker     Smokeless tobacco: Never Used   Substance Use Topics     Alcohol use: No     Alcohol/week: 0.0 standard drinks     Family History   Problem Relation Age of Onset     Hypertension Father      Genitourinary Problems Father         kidney stones     C.A.D. Maternal Grandfather      Colon  Cancer Paternal Grandfather         ?unsure     Alcoholism Paternal Grandfather      Genitourinary Problems Brother         kidney stones     Genitourinary Problems Brother         kidney stones         Current Outpatient Medications   Medication Sig Dispense Refill     ABILIFY 15 MG tablet Take 1 tablet by mouth daily       INVEGA SUSTENNA 234 MG/1.5ML PIETER INJECT 1 SYRINGE INTRAMUSCULARLY EVERY FOUR WEEKS TO BE ADMINISTERED BY PHARMACIST ON 9/10/20       metFORMIN (GLUCOPHAGE-XR) 500 MG 24 hr tablet Takes four tablets in am  3     albuterol (PROAIR HFA) 108 (90 Base) MCG/ACT inhaler 2 puffs at least TID and 2 puffs Q 4 hours prn. (Patient not taking: Reported on 5/5/2021) 1 Inhaler 1     order for DME Equipment being ordered: BIPAP  Patient Ciro Monet received a [employer] BiPap (60 Series) Bilevel. Pressures were set at 21/12 cm H2O.         Reviewed and updated as needed this visit by clinical staff  Tobacco  Allergies  Meds   Med Hx  Surg Hx  Fam Hx  Soc Hx        Reviewed and updated as needed this visit by Provider                    Review of Systems  CONSTITUTIONAL: NEGATIVE for fever, chills, change in weight  INTEGUMENTARY/SKIN: NEGATIVE for worrisome rashes, moles or lesions  EYES: NEGATIVE for vision changes or irritation  ENT: NEGATIVE for ear, mouth and throat problems  RESP: NEGATIVE for significant cough or SOB  CV: NEGATIVE for chest pain, palpitations or peripheral edema  GI: NEGATIVE for nausea, abdominal pain, heartburn, or change in bowel habits   male: negative for dysuria, hematuria, decreased urinary stream, erectile dysfunction, urethral discharge  MUSCULOSKELETAL: NEGATIVE for significant arthralgias or myalgia  NEURO: NEGATIVE for weakness, dizziness or paresthesias  PSYCHIATRIC: NEGATIVE for changes in mood or affect    OBJECTIVE:   /84 (BP Location: Right arm, Patient Position: Sitting, Cuff Size: Adult Large)   Pulse 90   Temp 98.7  F (37.1  C)  (Tympanic)   Wt (!) 189.1 kg (417 lb)   BMI 55.02 kg/m      Physical Exam  GENERAL: healthy, alert and no distress  NECK: no adenopathy, no asymmetry, masses, or scars and thyroid normal to palpation  RESP: lungs clear to auscultation - no rales, rhonchi or wheezes  CV: regular rate and rhythm, normal S1 S2, no S3 or S4, no murmur, click or rub, no peripheral edema and peripheral pulses strong  ABDOMEN: soft, nontender, no hepatosplenomegaly, no masses and bowel sounds normal  MS: no gross musculoskeletal defects noted, no edema    Diagnostic Test Results:  Labs reviewed in Epic    ASSESSMENT/PLAN:   (Z23) Need for prophylactic vaccination and inoculation against influenza  (primary encounter diagnosis)  Plan: INFLUENZA QUAD, RECOMBINANT, P-FREE (RIV4)         (FLUBLOK)         (G47.33) Obstructive sleep apnea syndrome  Plan: CPAP Order for DME - ONLY FOR DME, CBC with         platelets, CANCELED: Basic metabolic panel          (Ca, Cl, CO2, Creat, Gluc, K, Na, BUN)         (R97.20) Elevated prostate specific antigen (PSA)  Plan: PSA, screen      (E66.01,  Z68.43) Class 3 severe obesity due to excess calories with serious comorbidity and body mass index (BMI) of 50.0 to 59.9 in adult (H)  Plan: Peds Weight/Bariatric Referral    (Z12.5) Screening for prostate cancer  Plan: PSA, screen         (F25.9) Schizoaffective disorder, chronic condition (H)  Good control.  Continue currant medications, continue to monitor.  [facility] insight.   Plan: Comprehensive metabolic panel (BMP + Alb, Alk         Phos, ALT, AST, Total. Bili, TP)          Patient has been advised of split billing requirements and indicates understanding: Yes  COUNSELING:   Reviewed preventive health counseling, as reflected in patient instructions       Regular exercise       Healthy diet/nutrition       Vision screening       Hearing screening       Colon cancer screening       Prostate cancer screening    Estimated body mass index is 55.02 kg/m  as  "calculated from the following:    Height as of 5/5/21: 1.854 m (6' 1\").    Weight as of this encounter: 189.1 kg (417 lb).     Weight management plan: Discussed healthy diet and exercise guidelines    He reports that he has never smoked. He has never used smokeless tobacco.      Counseling Resources:  ATP IV Guidelines  Pooled Cohorts Equation Calculator  FRAX Risk Assessment  ICSI Preventive Guidelines  Dietary Guidelines for Americans, 2010  USDA's MyPlate  ASA Prophylaxis  Lung CA Screening    Keegan oMrgan MD  [facility]  "

## 2021-10-14 ENCOUNTER — TRANSFERRED RECORDS (OUTPATIENT)
Dept: HEALTH INFORMATION MANAGEMENT | Facility: CLINIC | Age: 51
End: 2021-10-14
Payer: COMMERCIAL

## 2021-10-26 ENCOUNTER — HOSPITAL ENCOUNTER (OUTPATIENT)
Dept: WOUND CARE | Facility: CLINIC | Age: 51
Discharge: HOME | End: 2021-10-26
Attending: FAMILY MEDICINE | Admitting: FAMILY MEDICINE
Payer: COMMERCIAL

## 2021-10-26 PROCEDURE — G0463 HOSPITAL OUTPT CLINIC VISIT: HCPCS

## 2021-10-26 PROCEDURE — G0463 HOSPITAL OUTPT CLINIC VISIT: HCPCS | Mod: 25

## 2021-10-26 PROCEDURE — 29581 APPL MULTLAYER CMPRN SYS LEG: CPT
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
## 2021-10-26 NOTE — DISCHARGE INSTRUCTIONS
Leave Coflex wrap in place until follow-up appointment on Tuesday.     Don t get your Coflex wrap wet. Cover the wrap completely with a plastic bag or wrap before taking a shower.     Remove your Coflex wrap ONLY IF you experience one or more of the following:  - New tingling or numbness in leg or foot  - Significant pain that cannot be relieved  - A fever of 100.4 F (38 C) or higher  - New coldness, or blue-gray color in the toes    Please call with any questions or concerns      Maribell Oh RN, CWOCN   [phone]

## 2021-11-02 ENCOUNTER — HOSPITAL ENCOUNTER (OUTPATIENT)
Dept: WOUND CARE | Facility: CLINIC | Age: 51
End: 2021-11-02
Attending: FAMILY MEDICINE
Payer: COMMERCIAL

## 2021-11-02 NOTE — DISCHARGE INSTRUCTIONS
"I used an unna boot today, this is a different wrap with calamine in it to help with the itching. Keep this wrap on and dry over the next week.  Do not use the velcro wrap \"brace\" over this, this will take the place of that.    If this falls down your leg and digs in or you are itching terribly then remove the wrap, keep your wound covered with a clean bandage changed daily and wear your velcro wrap \"brace\" every day during the day only, remove it at night.    Bring your velcro wrap \"brace\" for your appointment next week.  We may consider using this instead but I want to check the fit.    Maribell Oh RN, CWOCN [phone]      "
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
## 2021-11-08 ENCOUNTER — HOSPITAL ENCOUNTER (OUTPATIENT)
Dept: WOUND CARE | Facility: CLINIC | Age: 51
Discharge: HOME | End: 2021-11-08
Attending: FAMILY MEDICINE | Admitting: FAMILY MEDICINE
Payer: COMMERCIAL

## 2021-11-08 PROCEDURE — 29580 STRAPPING UNNA BOOT: CPT
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
## 2021-11-16 ENCOUNTER — HOSPITAL ENCOUNTER (OUTPATIENT)
Dept: WOUND CARE | Facility: CLINIC | Age: 51
Discharge: HOME | End: 2021-11-16
Attending: FAMILY MEDICINE | Admitting: FAMILY MEDICINE
Payer: COMMERCIAL

## 2021-11-16 PROCEDURE — 29580 STRAPPING UNNA BOOT: CPT
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
## 2021-11-22 DIAGNOSIS — Z11.59 ENCOUNTER FOR SCREENING FOR OTHER VIRAL DISEASES: ICD-10-CM

## 2021-11-23 ENCOUNTER — HOSPITAL ENCOUNTER (OUTPATIENT)
Dept: WOUND CARE | Facility: CLINIC | Age: 51
Discharge: HOME | End: 2021-11-23
Attending: FAMILY MEDICINE | Admitting: FAMILY MEDICINE
Payer: COMMERCIAL

## 2021-11-23 PROCEDURE — 29580 STRAPPING UNNA BOOT: CPT
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
## 2021-11-30 ENCOUNTER — HOSPITAL ENCOUNTER (OUTPATIENT)
Dept: WOUND CARE | Facility: CLINIC | Age: 51
Discharge: HOME | End: 2021-11-30
Attending: FAMILY MEDICINE | Admitting: FAMILY MEDICINE
Payer: COMMERCIAL

## 2021-11-30 PROCEDURE — 29580 STRAPPING UNNA BOOT: CPT
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
## 2021-12-02 ENCOUNTER — LAB (OUTPATIENT)
Dept: LAB | Facility: CLINIC | Age: 51
End: 2021-12-02
Attending: ORTHOPAEDIC SURGERY
Payer: COMMERCIAL

## 2021-12-02 DIAGNOSIS — Z11.59 ENCOUNTER FOR SCREENING FOR OTHER VIRAL DISEASES: ICD-10-CM

## 2021-12-02 PROCEDURE — U0003 INFECTIOUS AGENT DETECTION BY NUCLEIC ACID (DNA OR RNA); SEVERE ACUTE RESPIRATORY SYNDROME CORONAVIRUS 2 (SARS-COV-2) (CORONAVIRUS DISEASE [COVID-19]), AMPLIFIED PROBE TECHNIQUE, MAKING USE OF HIGH THROUGHPUT TECHNOLOGIES AS DESCRIBED BY CMS-2020-01-R: HCPCS

## 2021-12-02 PROCEDURE — U0005 INFEC AGEN DETEC AMPLI PROBE: HCPCS

## 2021-12-03 LAB — SARS-COV-2 RNA RESP QL NAA+PROBE: NEGATIVE

## 2021-12-06 ENCOUNTER — HOSPITAL ENCOUNTER (OUTPATIENT)
Dept: GENERAL RADIOLOGY | Facility: CLINIC | Age: 51
End: 2021-12-06
Attending: ORTHOPAEDIC SURGERY
Payer: COMMERCIAL

## 2021-12-06 ENCOUNTER — HOSPITAL ENCOUNTER (OUTPATIENT)
Dept: MRI IMAGING | Facility: CLINIC | Age: 51
End: 2021-12-06
Attending: ORTHOPAEDIC SURGERY
Payer: COMMERCIAL

## 2021-12-06 DIAGNOSIS — M25.511 RIGHT SHOULDER PAIN: ICD-10-CM

## 2021-12-06 PROCEDURE — 77002 NEEDLE LOCALIZATION BY XRAY: CPT

## 2021-12-06 PROCEDURE — 73222 MRI JOINT UPR EXTREM W/DYE: CPT | Mod: RT

## 2021-12-06 PROCEDURE — 250N000009 HC RX 250: Performed by: ORTHOPAEDIC SURGERY

## 2021-12-06 PROCEDURE — 96372 THER/PROPH/DIAG INJ SC/IM: CPT | Performed by: ORTHOPAEDIC SURGERY

## 2021-12-06 PROCEDURE — 23350 INJECTION FOR SHOULDER X-RAY: CPT

## 2021-12-06 PROCEDURE — 255N000002 HC RX 255 OP 636: Performed by: ORTHOPAEDIC SURGERY

## 2021-12-06 PROCEDURE — 250N000011 HC RX IP 250 OP 636: Performed by: ORTHOPAEDIC SURGERY

## 2021-12-06 PROCEDURE — A9585 GADOBUTROL INJECTION: HCPCS | Performed by: ORTHOPAEDIC SURGERY

## 2021-12-06 RX ORDER — LIDOCAINE HYDROCHLORIDE 10 MG/ML
30 INJECTION, SOLUTION EPIDURAL; INFILTRATION; INTRACAUDAL; PERINEURAL ONCE
Status: COMPLETED | OUTPATIENT
Start: 2021-12-06 | End: 2021-12-06

## 2021-12-06 RX ORDER — IOPAMIDOL 408 MG/ML
20 INJECTION, SOLUTION INTRATHECAL ONCE
Status: COMPLETED | OUTPATIENT
Start: 2021-12-06 | End: 2021-12-06

## 2021-12-06 RX ORDER — GADOBUTROL 604.72 MG/ML
0.1 INJECTION INTRAVENOUS ONCE
Status: COMPLETED | OUTPATIENT
Start: 2021-12-06 | End: 2021-12-06

## 2021-12-06 RX ORDER — EPINEPHRINE 1 MG/ML
0.1 INJECTION, SOLUTION, CONCENTRATE INTRAVENOUS ONCE
Status: COMPLETED | OUTPATIENT
Start: 2021-12-06 | End: 2021-12-06

## 2021-12-06 RX ADMIN — IOPAMIDOL 10 ML: 408 INJECTION, SOLUTION INTRATHECAL at 10:35

## 2021-12-06 RX ADMIN — EPINEPHRINE 1 MG: 1 INJECTION, SOLUTION, CONCENTRATE INTRAVENOUS at 10:35

## 2021-12-06 RX ADMIN — LIDOCAINE HYDROCHLORIDE 5 ML: 10 INJECTION, SOLUTION EPIDURAL; INFILTRATION; INTRACAUDAL; PERINEURAL at 10:34

## 2021-12-06 RX ADMIN — GADOBUTROL 2 ML: 604.72 INJECTION INTRAVENOUS at 10:34

## 2021-12-06 NOTE — PROGRESS NOTES
RADIOLOGY PROCEDURE NOTE  Patient name: Ciro Monet  MRN: [id number]  : 1970    Pre-procedure diagnosis: Pain.  Post-procedure diagnosis: Same    Procedure Date/Time: 2021  10:35 AM  Procedure: Right shoulder intraarticular GILMAR injection for MRI to follow.  Estimated blood loss: None  Specimen(s) collected with description: None.  The patient tolerated the procedure well with no immediate complications.    See imaging dictation for procedural details.    Provider name: Jonathan Baca MD  Assistant(s):None

## 2021-12-07 ENCOUNTER — HOSPITAL ENCOUNTER (OUTPATIENT)
Dept: WOUND CARE | Facility: CLINIC | Age: 51
Discharge: HOME | End: 2021-12-07
Attending: FAMILY MEDICINE | Admitting: FAMILY MEDICINE
Payer: COMMERCIAL

## 2021-12-07 PROCEDURE — 29580 STRAPPING UNNA BOOT: CPT
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
## 2021-12-14 ENCOUNTER — HOSPITAL ENCOUNTER (OUTPATIENT)
Dept: WOUND CARE | Facility: CLINIC | Age: 51
Discharge: HOME | End: 2021-12-14
Attending: FAMILY MEDICINE | Admitting: FAMILY MEDICINE
Payer: COMMERCIAL

## 2021-12-14 PROCEDURE — G0463 HOSPITAL OUTPT CLINIC VISIT: HCPCS

## 2021-12-14 NOTE — DISCHARGE INSTRUCTIONS
some cortisone cream and use on the skin rash below your wound once daily.  Cover with bandage if there is drainage and change daily.    Wear the tan liner I provided with your compression wrap (pulled tightly) every day.  Put it on in the morning and remove at night.  May wash the tan liner and air dry it.     Follow-up in one week.    Maribell Oh RN, CWOCN [phone]

## 2021-12-16 ENCOUNTER — TRANSFERRED RECORDS (OUTPATIENT)
Dept: HEALTH INFORMATION MANAGEMENT | Facility: CLINIC | Age: 51
End: 2021-12-16
Payer: COMMERCIAL

## 2021-12-21 ENCOUNTER — HOSPITAL ENCOUNTER (OUTPATIENT)
Dept: WOUND CARE | Facility: CLINIC | Age: 51
Discharge: HOME | End: 2021-12-21
Attending: FAMILY MEDICINE | Admitting: FAMILY MEDICINE
Payer: COMMERCIAL

## 2021-12-21 PROCEDURE — 29580 STRAPPING UNNA BOOT: CPT
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
## 2021-12-28 ENCOUNTER — HOSPITAL ENCOUNTER (OUTPATIENT)
Dept: WOUND CARE | Facility: CLINIC | Age: 51
Discharge: HOME | End: 2021-12-28
Attending: FAMILY MEDICINE | Admitting: FAMILY MEDICINE
Payer: COMMERCIAL

## 2021-12-28 DIAGNOSIS — I83.002: ICD-10-CM

## 2021-12-28 DIAGNOSIS — I83.009 VENOUS STASIS ULCER (H): Primary | ICD-10-CM

## 2021-12-28 DIAGNOSIS — L97.202: ICD-10-CM

## 2021-12-28 DIAGNOSIS — L97.909 VENOUS STASIS ULCER (H): Primary | ICD-10-CM

## 2021-12-28 PROCEDURE — 29580 STRAPPING UNNA BOOT: CPT
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
## 2021-12-30 NOTE — ADDENDUM NOTE
Encounter addended by: Maribell Oh on: 12/30/2021 5:42 PM   Actions taken: Visit diagnoses modified, Charge Capture section accepted

## 2022-01-05 ENCOUNTER — HOSPITAL ENCOUNTER (OUTPATIENT)
Dept: WOUND CARE | Facility: CLINIC | Age: 52
Discharge: HOME | End: 2022-01-05
Attending: SURGERY | Admitting: SURGERY
Payer: COMMERCIAL

## 2022-01-05 PROCEDURE — 29580 STRAPPING UNNA BOOT: CPT
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
## 2022-01-11 ENCOUNTER — HOSPITAL ENCOUNTER (OUTPATIENT)
Dept: WOUND CARE | Facility: CLINIC | Age: 52
Discharge: HOME | End: 2022-01-11
Attending: FAMILY MEDICINE | Admitting: FAMILY MEDICINE
Payer: COMMERCIAL

## 2022-01-11 PROCEDURE — G0463 HOSPITAL OUTPT CLINIC VISIT: HCPCS

## 2022-01-11 NOTE — DISCHARGE INSTRUCTIONS
Purchase some Cortisone cream from the pharmacy and start applying this to the rash on your ankle once daily.     Wear your velcro compression wrap on your calf.  This needs to be applied first thing in the morning when you wake up and removed at night when you go to bed.  Pull it as tightly as you can, it should fit snugly.  Do not use a liner under it.  Wear a stocking that does not dig into your ankle (needs to be loose on your ankle).    Appointment Monday Jan 24th at 4pm at [facility] in [place] to be fit with a new garment    [facility] [place] [address]  [address]  Call [phone] if questions      Follow-up in 2 weeks, (Jan 25th, after the appointment with Orthotics).    Maribell Oh RN, CWOCN [phone]

## 2022-01-13 NOTE — PROGRESS NOTES
"Reason For Visit: Ciro Monet, 51 year old  here for follow-up on his left lower leg wound.  Referred earlier this year by Dr. Morgan following an appointment on 5/5/21 for cellulitis left LE.  Denies concerns.    At prior visit, pt states he heard back from Orthotist in [place], and they need to get a larger table as they don't have one to accommodate his size. Then they will be able to assess him for a wrap, per the patient.    In prior episode, he was treated with multilayer compression wrapping until mostly healed then referred to be fit for velcro compression wrap for maintenance care.  Pt states he did get this wrap and was wearing sometimes but not consistently, when asked why states \"I just didn't\".  Father called for appointment when he noted wound was coming back.  Pt states there was a scab on the area that he picked off yesterday.  Is not clear if this ever healed since last seen in June.       Pertinent Medical/Surgical History: Pt has had prior consultation with Dr. Gleason in Feb of '20 at which time was noted to have venous incompetence.  A compression pump was recommended though pt states no one ever contacted him to set this up.  Pt states initial etiology of this wound was a burn and it has been present for a long time.    Pt with comorbidities including bipolar disorder, CHARITY, obesity, hyperlipidemia, schizoaffective disorder.     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 His father helps him with some household things but he is otherwise independent.    Objective:  Unna boot compression wrap is in place, this has come down some.    Wound #: Left lower leg Medial/anterior  Open area is healed with new epithelium. Patch of dermatitis is noted distal to healed area, this is slightly moist appearing.    Limb assessment  Stasis changes:  telangiectasis/corona phlebatica/ankle flare, varicose veins, hemosiderin staining/pigmentation noted around wound, "   Edema: resolved- ankle measures 31cm where his ankle indents, 37cm just above this in upper ankle, 47cm lower calf, and 54cm mid calf.  Measurements from heel to top of calf are 48cm.  DP Pulse  palpable: yes doppler: triphasic  PT Pulse  palpable: yes doppler: triphasic  Hair growth:   Capillary Refill: <2 seconds  Feet/toes color: pink  Feet/toes temperature: warm  Sensation:   Nails:   Ankle mobility:     Prior pertinent imaging: prior venous competency studies on file - he has known incompetence of left common femoral vein and focal incompetence of greater saphenous vein    Diet: not addressed this visit    Smoking: no    Mobility: no concerns    Activity: ambulatory, no limits     Other callusing/areas of concern: no    Assessment:   Healed wound in patient with venous insufficiency    Pt needs alternate compression garment    Plan:    At this time in discussion with pt made decision to hold unna boot, this will all for treatment of area of dermatitis.  Wound is fully healed.  Historically his leg does not swell considerably so even if some swelling occurs do not feel it will impact garment fitting. Did obtain a set of measurements today.     Writer did call  [facility] in [place] and was able schedule an appointment for pt for a fitting on 1/24/22 at 4pm. Pt provided with this information regarding appointment, states he will go.    For now, pt was directed to apply and wear his current velcro compression garment with no liner, and apply snuggly and waer all day, removing at night for bed.     Pt directed to purchase and use OTC cortisone cream on his ankle daily.     See AVS for printout provided and reviewed with pt.    Return visit: one week    Approximately 25 min spent face to face  Procedure: none    Maribell Oh RN, CWOCN   [phone]

## 2022-01-24 ENCOUNTER — DOCUMENTATION ONLY (OUTPATIENT)
Dept: ORTHOPEDICS | Facility: CLINIC | Age: 52
End: 2022-01-24
Payer: COMMERCIAL

## 2022-01-24 NOTE — PROGRESS NOTES
S: Patient was seen at the [facility] today, to be measured for . Rx on file from Dr. Morgna is current and written on 12/31/21 for velcro compression garments, CCL3.     O: Goal is to evaluate and measure patient for a compression garment that will help control the swelling and ultimately help to maintain circulation to the patient's wound to promote healing. The patient presented at the office wearing his compression wraps that were received more than 6 months ago. The wraps that he had on were tattered from constant wear and had slipped down his leg - indicating a change in the volume of fluid present in the limb. Upon removing the garment, observations showed swelling present around the leg, as well as a significantly uneven disbursement of fluid in the leg. The wound the the patient has on the left lower limb is roughly 6cm wide x 6cm long and is evidently lacking proper blood flow. The expected outcome with compliant use of the new compression wraps is to reduce swelling and improve the patient s condition by increasing blood flow throughout the affected (left) lower limb.     A: Measurements were taken for [employer] Juxtafit Premium Circaids.  The garment is made of a flat-knit material versus the typical circular knitting and helps maintain the reduced volume and shape of the limb. It also helps inhibit further fluid accumulation. The juxtafit premium circaid wrap will help prevent roping that was found in the last pair because they have a built-in compression measurement system that the patient will easily be able to reference. An order will be placed for fabrication of the custom garment to [employer] today.     P: The patient has been scheduled for a follow-up appointment next week, at which we will review donning and doffing, use and care, and a wearing schedule for the garments. The plan is to ensure that the garments fit the patient properly and that the patient understands the previously mentioned  information before leaving the office with the garments.      This note has been electronically signed by SALAS Mensah.

## 2022-01-26 ENCOUNTER — OFFICE VISIT (OUTPATIENT)
Dept: FAMILY MEDICINE | Facility: CLINIC | Age: 52
End: 2022-01-26
Payer: COMMERCIAL

## 2022-01-26 VITALS
WEIGHT: 315 LBS | BODY MASS INDEX: 41.75 KG/M2 | HEIGHT: 73 IN | RESPIRATION RATE: 22 BRPM | SYSTOLIC BLOOD PRESSURE: 119 MMHG | TEMPERATURE: 98.8 F | HEART RATE: 102 BPM | DIASTOLIC BLOOD PRESSURE: 85 MMHG

## 2022-01-26 DIAGNOSIS — E88.810 METABOLIC SYNDROME X: ICD-10-CM

## 2022-01-26 DIAGNOSIS — M19.011 OSTEOARTHRITIS OF RIGHT SHOULDER, UNSPECIFIED OSTEOARTHRITIS TYPE: ICD-10-CM

## 2022-01-26 DIAGNOSIS — F25.9 SCHIZOAFFECTIVE DISORDER, CHRONIC CONDITION (H): ICD-10-CM

## 2022-01-26 DIAGNOSIS — Z01.818 PREOP GENERAL PHYSICAL EXAM: Primary | ICD-10-CM

## 2022-01-26 DIAGNOSIS — G47.33 OBSTRUCTIVE SLEEP APNEA SYNDROME: ICD-10-CM

## 2022-01-26 LAB
ANION GAP SERPL CALCULATED.3IONS-SCNC: 1 MMOL/L (ref 3–14)
BUN SERPL-MCNC: 20 MG/DL (ref 7–30)
CALCIUM SERPL-MCNC: 9.6 MG/DL (ref 8.5–10.1)
CHLORIDE BLD-SCNC: 107 MMOL/L (ref 94–109)
CO2 SERPL-SCNC: 33 MMOL/L (ref 20–32)
CREAT SERPL-MCNC: 0.73 MG/DL (ref 0.66–1.25)
GFR SERPL CREATININE-BSD FRML MDRD: >90 ML/MIN/1.73M2
GLUCOSE BLD-MCNC: 123 MG/DL (ref 70–99)
HBA1C MFR BLD: 5.9 % (ref 0–5.6)
POTASSIUM BLD-SCNC: 4.1 MMOL/L (ref 3.4–5.3)
SODIUM SERPL-SCNC: 141 MMOL/L (ref 133–144)

## 2022-01-26 PROCEDURE — 93000 ELECTROCARDIOGRAM COMPLETE: CPT | Performed by: FAMILY MEDICINE

## 2022-01-26 PROCEDURE — 99214 OFFICE O/P EST MOD 30 MIN: CPT | Performed by: FAMILY MEDICINE

## 2022-01-26 PROCEDURE — 83036 HEMOGLOBIN GLYCOSYLATED A1C: CPT | Performed by: FAMILY MEDICINE

## 2022-01-26 PROCEDURE — 80048 BASIC METABOLIC PNL TOTAL CA: CPT | Performed by: FAMILY MEDICINE

## 2022-01-26 PROCEDURE — 36415 COLL VENOUS BLD VENIPUNCTURE: CPT | Performed by: FAMILY MEDICINE

## 2022-01-26 ASSESSMENT — PAIN SCALES - GENERAL: PAINLEVEL: SEVERE PAIN (6)

## 2022-01-26 ASSESSMENT — MIFFLIN-ST. JEOR: SCORE: 2848.01

## 2022-01-26 NOTE — PATIENT INSTRUCTIONS
Preparing for Your Surgery  Getting started  A nurse will call you to review your health history and instructions. They will give you an arrival time based on your scheduled surgery time. Please be ready to share:    Your doctor's clinic name and phone number    Your medical, surgical and anesthesia history    A list of allergies and sensitivities    A list of medicines, including herbal treatments and over-the-counter drugs    Whether the patient has a legal guardian (ask how to send us the papers in advance)  Please tell us if you're pregnant--or if there's any chance you might be pregnant. Some surgeries may injure a fetus (unborn baby), so they require a pregnancy test. Surgeries that are safe for a fetus don't always need a test, and you can choose whether to have one.   If you have a child who's having surgery, please ask for a copy of Preparing for Your Child's Surgery.    Preparing for surgery    Within 30 days of surgery: Have a pre-op exam (sometimes called an H&P, or History and Physical). This can be done at a clinic or pre-operative center.  ? If you're having a , you may not need this exam. Talk to your care team.    At your pre-op exam, talk to your care team about all medicines you take. If you need to stop any medicines before surgery, ask when to start taking them again.  ? We do this for your safety. Many medicines can make you bleed too much during surgery. Some change how well surgery (anesthesia) drugs work.    Call your insurance company to let them know you're having surgery. (If you don't have insurance, call [phone].)    Call your clinic if there's any change in your health. This includes signs of a cold or flu (sore throat, runny nose, cough, rash, fever). It also includes a scrape or scratch near the surgery site.    If you have questions on the day of surgery, call your hospital or surgery center.  COVID testing  You may need to be tested for COVID-19 before having  surgery. If so, your surgical team will give you instructions for scheduling this test, separate from your preoperative history and physical.  Eating and drinking guidelines  For your safety: Unless your surgeon tells you otherwise, follow the guidelines below.    Eat and drink as usual until 8 hours before surgery. After that, no food or milk.    Drink clear liquids until 2 hours before surgery. These are liquids you can see through, like water, Gatorade and Propel Water. You may also have black coffee and tea (no cream or milk).    Nothing by mouth within 2 hours of surgery. This includes gum, candy and breath mints.    If you drink alcohol: Stop drinking it the night before surgery.    If your care team tells you to take medicine on the morning of surgery, it's okay to take it with a sip of water.  Preventing infection    Shower or bathe the night before and morning of your surgery. Follow the instructions your clinic gave you. (If no instructions, use regular soap.)    Don't shave or clip hair near your surgery site. We'll remove the hair if needed.    Don't smoke or vape the morning of surgery. You may chew nicotine gum up to 2 hours before surgery. A nicotine patch is okay.  ? Note: Some surgeries require you to completely quit smoking and nicotine. Check with your surgeon.    Your care team will make every effort to keep you safe from infection. We will:  ? Clean our hands often with soap and water (or an alcohol-based hand rub).  ? Clean the skin at your surgery site with a special soap that kills germs.  ? Give you a special gown to keep you warm. (Cold raises the risk of infection.)  ? Wear special hair covers, masks, gowns and gloves during surgery.  ? Give antibiotic medicine, if prescribed. Not all surgeries need antibiotics.  What to bring on the day of surgery    Photo ID and insurance card    Copy of your health care directive, if you have one    Glasses and hearing aides (bring cases)  ? You can't  wear contacts during surgery    Inhaler and eye drops, if you use them (tell us about these when you arrive)    CPAP machine or breathing device, if you use them    A few personal items, if spending the night    If you have . . .  ? A pacemaker, ICD (cardiac defibrillator) or other implant: Bring the ID card.  ? An implanted stimulator: Bring the remote control.  ? A legal guardian: Bring a copy of the certified (court-stamped) guardianship papers.  Please remove any jewelry, including body piercings. Leave jewelry and other valuables at home.  If you're going home the day of surgery    You must have a responsible adult drive you home. They should stay with you overnight as well.    If you don't have someone to stay with you, and you aren't safe to go home alone, we may keep you overnight. Insurance often won't pay for this.  After surgery  If it's hard to control your pain or you need more pain medicine, please call your surgeon's office.  Questions?   If you have any questions for your care team, list them here: _________________________________________________________________________________________________________________________________________________________________________ ____________________________________ ____________________________________ ____________________________________  For informational purposes only. Not to replace the advice of your health care provider. Copyright   2003, 2019 [facility]. All rights reserved. Clinically reviewed by Zaira Meraz MD. [employer] [id number] - REV 07/21.

## 2022-01-26 NOTE — PROGRESS NOTES
[facility]  [address]  [facility] [address]  Phone: [phone]  Primary Provider: Beni Morgan  Pre-op Performing Provider: BENI MORGAN      PREOPERATIVE EVALUATION:  Today's date: 1/26/2022    Ciro Monet is a 51 year old male who presents for a preoperative evaluation.    Surgical Information:  Surgery/Procedure: right  Shoulder Arthroscopy Acromioclavicular Resection,Biceps Tenodesis,Rotator Cuff Repair  Surgery Location: [facility]  Surgeon: Stevan Nieves MD  Surgery Date: 02/17/2022  Time of Surgery: 7:30 am  Where patient plans to recover: At home with family  Fax number for surgical facility: Note does not need to be faxed, will be available electronically in Epic.    Type of Anesthesia Anticipated: Choice    Assessment & Plan     The proposed surgical procedure is considered INTERMEDIATE risk.    Preop general physical exam    (M19.011) Osteoarthritis of right shoulder, unspecified osteoarthritis type  Okay for surgery.     (G47.33) Obstructive sleep apnea syndrome  Will bring his cpap along    (F25.9) Schizoaffective disorder, chronic condition (H)  Good control.  Continue currant medications, continue to monitor.       (E88.81) Metabolic syndrome X  Good control.  Continue currant medications, continue to monitor.           Risks and Recommendations:  The patient has the following additional risks and recommendations for perioperative complications:   - Morbid obesity (BMI >40)    Medication Instructions:   - metformin: HOLD day of surgery.    RECOMMENDATION:  APPROVAL GIVEN to proceed with proposed procedure, without further diagnostic evaluation.      Subjective     HPI related to upcoming procedure: has long standing right shoulder pain.     Preop Questions 1/26/2022   1. Have you ever had a heart attack or stroke? No   2. Have you ever had surgery on your heart or blood vessels, such as a stent placement, a coronary artery bypass, or  surgery on an artery in your head, neck, heart, or legs? No   3. Do you have chest pain with activity? No   4. Do you have a history of  heart failure? No   5. Do you currently have a cold, bronchitis or symptoms of other infection? No   6. Do you have a cough, shortness of breath, or wheezing? No   7. Do you or anyone in your family have previous history of blood clots? No   8. Do you or does anyone in your family have a serious bleeding problem such as prolonged bleeding following surgeries or cuts? No   9. Have you ever had problems with anemia or been told to take iron pills? No   10. Have you had any abnormal blood loss such as black, tarry or bloody stools? YES -    11. Have you ever had a blood transfusion? No   12. Are you willing to have a blood transfusion if it is medically needed before, during, or after your surgery? Yes   13. Have you or any of your relatives ever had problems with anesthesia? No   14. Do you have sleep apnea, excessive snoring or daytime drowsiness? YES - will bring his cpap   14a. Do you have a CPAP machine? Yes   15. Do you have any artifical heart valves or other implanted medical devices like a pacemaker, defibrillator, or continuous glucose monitor? No   16. Do you have artificial joints? No   17. Are you allergic to latex? No       Health Care Directive:  Patient does not have a Health Care Directive or Living Will: Discussed advance care planning with patient; however, patient declined at this time.    Preoperative Review of :   reviewed - no record of controlled substances prescribed.      Status of Chronic Conditions:  See problem list for active medical problems.  Problems all longstanding and stable, except as noted/documented.  See ROS for pertinent symptoms related to these conditions.      Review of Systems  CONSTITUTIONAL: NEGATIVE for fever, chills, change in weight  ENT/MOUTH: NEGATIVE for ear, mouth and throat problems  RESP: NEGATIVE for significant cough or  SOB  CV: NEGATIVE for chest pain, palpitations or peripheral edema    Patient Active Problem List    Diagnosis Date Noted     Schizoaffective disorder, chronic condition (H) 12/11/2017     Priority: Medium     Incarcerated hernia 10/29/2016     Priority: Medium     Umbilical hernia, incarcerated 10/29/2016     Priority: Medium     Renal cyst needs ct 6/15 11/04/2014     Priority: Medium     Localized superficial swelling, mass, or lump 06/19/2014     Priority: Medium     Morbid obesity (H) 06/30/2011     Priority: Medium     HYPERLIPIDEMIA LDL GOAL <160 10/31/2010     Priority: Medium     CHARITY (Obstructive Sleep Apnea)-severe () 02/09/2010     Priority: Medium     Esophageal reflux 03/05/2007     Priority: Medium     Moderate depressed bipolar I disorder (H) 05/08/2006     Priority: Medium     Problem list name updated by automated process. Provider to review        Past Medical History:   Diagnosis Date     Hiatal hernia      Morbid obesity with BMI of 45.0-49.9, adult (H)      CHARITY (obstructive sleep apnea) 4/2010      CPAP 15     Past Surgical History:   Procedure Laterality Date     HC REPAIR EPIGASTRIC HERNIA,REDUC  11/23/07     HC TOOTH EXTRACTION W/FORCEP       HEMORRHOID INJECTION SCLEROSING SOLUTION       HERNIORRHAPHY UMBILICAL N/A 10/29/2016    Procedure: HERNIORRHAPHY UMBILICAL;  Surgeon: Lori Barron MD;  Location:  OR     [facility]  ?2005?     Current Outpatient Medications   Medication Sig Dispense Refill     ABILIFY 15 MG tablet Take 1 tablet by mouth daily       albuterol (PROAIR HFA) 108 (90 Base) MCG/ACT inhaler 2 puffs at least TID and 2 puffs Q 4 hours prn. (Patient not taking: Reported on 5/5/2021) 1 Inhaler 1     INVEGA SUSTENNA 234 MG/1.5ML PIETER INJECT 1 SYRINGE INTRAMUSCULARLY EVERY FOUR WEEKS TO BE ADMINISTERED BY PHARMACIST ON 9/10/20       metFORMIN (GLUCOPHAGE-XR) 500 MG 24 hr tablet Takes four tablets in am  3     order for DME Equipment being ordered: BIPAP  Patient  Ciro Monet received a [employer] REMstar BiPap (60 Series) Bilevel. Pressures were set at 21/12 cm H2O.         Allergies   Allergen Reactions     Adhesive Tape Rash     3M Plastic adhesive transpore tape per dad. Blisters and rash.     Pine Swelling and Difficulty breathing     Eyes swelling & difficultly breathing.        Social History     Tobacco Use     Smoking status: Never Smoker     Smokeless tobacco: Never Used   Substance Use Topics     Alcohol use: No     Alcohol/week: 0.0 standard drinks     Family History   Problem Relation Age of Onset     Hypertension Father      Genitourinary Problems Father         kidney stones     C.A.D. Maternal Grandfather      Colon Cancer Paternal Grandfather         ?unsure     Alcoholism Paternal Grandfather      Genitourinary Problems Brother         kidney stones     Genitourinary Problems Brother         kidney stones     History   Drug Use No         Objective     There were no vitals taken for this visit.    Physical Exam  GENERAL APPEARANCE: healthy, alert and no distress  HENT: ear canals and TM's normal and nose and mouth without ulcers or lesions  RESP: lungs clear to auscultation - no rales, rhonchi or wheezes  CV: regular rate and rhythm, normal S1 S2, no S3 or S4 and no murmur, click or rub   ABDOMEN: soft, nontender, no HSM or masses and bowel sounds normal  NEURO: Normal strength and tone, sensory exam grossly normal, mentation intact and speech normal    Recent Labs   Lab Test 09/29/21  1548 05/05/21  1518 09/28/20  1658   HGB 14.3  --  14.8     --   --      --  134   POTASSIUM 4.0  --  4.1   CR 0.76  --  0.91   A1C  --  5.7* 5.6        Diagnostics:  Labs pending at this time.  Results will be reviewed when available.   EKG: appears normal, NSR, normal axis, normal intervals, no acute ST/T changes c/w ischemia, no LVH by voltage criteria, unchanged from previous tracings    Revised Cardiac Risk Index (RCRI):  The patient has the  following serious cardiovascular risks for perioperative complications:   - No serious cardiac risks = 0 points     RCRI Interpretation: 0 points: Class I (very low risk - 0.4% complication rate)           Signed Electronically by: Keegan Morgan MD  Copy of this evaluation report is provided to requesting physician.

## 2022-02-10 ENCOUNTER — ANESTHESIA EVENT (OUTPATIENT)
Dept: SURGERY | Facility: CLINIC | Age: 52
End: 2022-02-10
Payer: COMMERCIAL

## 2022-02-10 RX ORDER — MAGNESIUM SULFATE HEPTAHYDRATE 40 MG/ML
2 INJECTION, SOLUTION INTRAVENOUS ONCE
Status: CANCELLED | OUTPATIENT
Start: 2022-02-10 | End: 2022-02-10

## 2022-02-14 ENCOUNTER — LAB (OUTPATIENT)
Dept: LAB | Facility: CLINIC | Age: 52
End: 2022-02-14
Attending: FAMILY MEDICINE
Payer: COMMERCIAL

## 2022-02-14 DIAGNOSIS — Z01.818 PREOP GENERAL PHYSICAL EXAM: ICD-10-CM

## 2022-02-14 PROCEDURE — U0005 INFEC AGEN DETEC AMPLI PROBE: HCPCS

## 2022-02-14 PROCEDURE — U0003 INFECTIOUS AGENT DETECTION BY NUCLEIC ACID (DNA OR RNA); SEVERE ACUTE RESPIRATORY SYNDROME CORONAVIRUS 2 (SARS-COV-2) (CORONAVIRUS DISEASE [COVID-19]), AMPLIFIED PROBE TECHNIQUE, MAKING USE OF HIGH THROUGHPUT TECHNOLOGIES AS DESCRIBED BY CMS-2020-01-R: HCPCS

## 2022-02-15 LAB — SARS-COV-2 RNA RESP QL NAA+PROBE: NEGATIVE

## 2022-02-17 ENCOUNTER — ANESTHESIA (OUTPATIENT)
Dept: SURGERY | Facility: CLINIC | Age: 52
End: 2022-02-17
Payer: COMMERCIAL

## 2022-02-17 ENCOUNTER — HOSPITAL ENCOUNTER (OUTPATIENT)
Facility: CLINIC | Age: 52
Discharge: HOME HEALTH | End: 2022-02-18
Attending: ORTHOPAEDIC SURGERY | Admitting: ORTHOPAEDIC SURGERY
Payer: COMMERCIAL

## 2022-02-17 DIAGNOSIS — M75.121 NONTRAUMATIC COMPLETE TEAR OF RIGHT ROTATOR CUFF: Primary | ICD-10-CM

## 2022-02-17 LAB
CREAT SERPL-MCNC: 0.73 MG/DL (ref 0.66–1.25)
ERYTHROCYTE [DISTWIDTH] IN BLOOD BY AUTOMATED COUNT: 12.8 % (ref 10–15)
GFR SERPL CREATININE-BSD FRML MDRD: >90 ML/MIN/1.73M2
GLUCOSE BLDC GLUCOMTR-MCNC: 112 MG/DL (ref 70–99)
GLUCOSE BLDC GLUCOMTR-MCNC: 145 MG/DL (ref 70–99)
HCT VFR BLD AUTO: 44.6 % (ref 40–53)
HGB BLD-MCNC: 14.6 G/DL (ref 13.3–17.7)
INR PPP: 0.97 (ref 0.85–1.15)
MCH RBC QN AUTO: 30.1 PG (ref 26.5–33)
MCHC RBC AUTO-ENTMCNC: 32.7 G/DL (ref 31.5–36.5)
MCV RBC AUTO: 92 FL (ref 78–100)
PLATELET # BLD AUTO: 227 10E3/UL (ref 150–450)
POTASSIUM BLD-SCNC: 4.5 MMOL/L (ref 3.4–5.3)
RBC # BLD AUTO: 4.85 10E6/UL (ref 4.4–5.9)
WBC # BLD AUTO: 7.8 10E3/UL (ref 4–11)

## 2022-02-17 PROCEDURE — 85027 COMPLETE CBC AUTOMATED: CPT | Performed by: PHYSICIAN ASSISTANT

## 2022-02-17 PROCEDURE — 250N000011 HC RX IP 250 OP 636: Performed by: ORTHOPAEDIC SURGERY

## 2022-02-17 PROCEDURE — 36415 COLL VENOUS BLD VENIPUNCTURE: CPT | Performed by: PHYSICIAN ASSISTANT

## 2022-02-17 PROCEDURE — 999N000141 HC STATISTIC PRE-PROCEDURE NURSING ASSESSMENT: Performed by: ORTHOPAEDIC SURGERY

## 2022-02-17 PROCEDURE — 250N000013 HC RX MED GY IP 250 OP 250 PS 637: Performed by: PHYSICIAN ASSISTANT

## 2022-02-17 PROCEDURE — 271N000001 HC OR GENERAL SUPPLY NON-STERILE: Performed by: ORTHOPAEDIC SURGERY

## 2022-02-17 PROCEDURE — 258N000003 HC RX IP 258 OP 636: Performed by: NURSE ANESTHETIST, CERTIFIED REGISTERED

## 2022-02-17 PROCEDURE — 360N000077 HC SURGERY LEVEL 4, PER MIN: Performed by: ORTHOPAEDIC SURGERY

## 2022-02-17 PROCEDURE — 258N000003 HC RX IP 258 OP 636: Performed by: ORTHOPAEDIC SURGERY

## 2022-02-17 PROCEDURE — 250N000011 HC RX IP 250 OP 636: Performed by: NURSE ANESTHETIST, CERTIFIED REGISTERED

## 2022-02-17 PROCEDURE — 85610 PROTHROMBIN TIME: CPT | Performed by: PHYSICIAN ASSISTANT

## 2022-02-17 PROCEDURE — 250N000013 HC RX MED GY IP 250 OP 250 PS 637: Performed by: ORTHOPAEDIC SURGERY

## 2022-02-17 PROCEDURE — 84132 ASSAY OF SERUM POTASSIUM: CPT | Performed by: PHYSICIAN ASSISTANT

## 2022-02-17 PROCEDURE — 250N000012 HC RX MED GY IP 250 OP 636 PS 637: Performed by: PHYSICIAN ASSISTANT

## 2022-02-17 PROCEDURE — 82962 GLUCOSE BLOOD TEST: CPT | Mod: 91

## 2022-02-17 PROCEDURE — 82565 ASSAY OF CREATININE: CPT | Performed by: PHYSICIAN ASSISTANT

## 2022-02-17 PROCEDURE — 258N000001 HC RX 258: Performed by: ORTHOPAEDIC SURGERY

## 2022-02-17 PROCEDURE — 272N000001 HC OR GENERAL SUPPLY STERILE: Performed by: ORTHOPAEDIC SURGERY

## 2022-02-17 PROCEDURE — 250N000009 HC RX 250: Performed by: NURSE ANESTHETIST, CERTIFIED REGISTERED

## 2022-02-17 PROCEDURE — 370N000017 HC ANESTHESIA TECHNICAL FEE, PER MIN: Performed by: ORTHOPAEDIC SURGERY

## 2022-02-17 PROCEDURE — 94640 AIRWAY INHALATION TREATMENT: CPT

## 2022-02-17 PROCEDURE — 250N000025 HC SEVOFLURANE, PER MIN: Performed by: ORTHOPAEDIC SURGERY

## 2022-02-17 PROCEDURE — 250N000009 HC RX 250: Performed by: ORTHOPAEDIC SURGERY

## 2022-02-17 PROCEDURE — C1713 ANCHOR/SCREW BN/BN,TIS/BN: HCPCS | Performed by: ORTHOPAEDIC SURGERY

## 2022-02-17 PROCEDURE — 250N000013 HC RX MED GY IP 250 OP 250 PS 637: Performed by: NURSE ANESTHETIST, CERTIFIED REGISTERED

## 2022-02-17 PROCEDURE — 250N000011 HC RX IP 250 OP 636: Performed by: PHYSICIAN ASSISTANT

## 2022-02-17 PROCEDURE — 710N000010 HC RECOVERY PHASE 1, LEVEL 2, PER MIN: Performed by: ORTHOPAEDIC SURGERY

## 2022-02-17 PROCEDURE — 96372 THER/PROPH/DIAG INJ SC/IM: CPT | Performed by: PHYSICIAN ASSISTANT

## 2022-02-17 DEVICE — IMP ANCHOR ARTHREX BIO-SWIVELOCK 4.75X19.1MM AR-2324BCC: Type: IMPLANTABLE DEVICE | Site: SHOULDER | Status: FUNCTIONAL

## 2022-02-17 DEVICE — IMPLANTABLE DEVICE: Type: IMPLANTABLE DEVICE | Site: SHOULDER | Status: FUNCTIONAL

## 2022-02-17 DEVICE — IMP SU ANCHOR ARTHREX REPAIR 1.3MM FIBERTAK AR-3671: Type: IMPLANTABLE DEVICE | Site: SHOULDER | Status: FUNCTIONAL

## 2022-02-17 RX ORDER — ACETAMINOPHEN 325 MG/1
975 TABLET ORAL ONCE
Status: COMPLETED | OUTPATIENT
Start: 2022-02-17 | End: 2022-02-17

## 2022-02-17 RX ORDER — HYDROMORPHONE HCL IN WATER/PF 6 MG/30 ML
0.4 PATIENT CONTROLLED ANALGESIA SYRINGE INTRAVENOUS EVERY 5 MIN PRN
Status: DISCONTINUED | OUTPATIENT
Start: 2022-02-17 | End: 2022-02-17 | Stop reason: HOSPADM

## 2022-02-17 RX ORDER — SODIUM CHLORIDE, SODIUM LACTATE, POTASSIUM CHLORIDE, CALCIUM CHLORIDE 600; 310; 30; 20 MG/100ML; MG/100ML; MG/100ML; MG/100ML
INJECTION, SOLUTION INTRAVENOUS CONTINUOUS
Status: DISCONTINUED | OUTPATIENT
Start: 2022-02-17 | End: 2022-02-17 | Stop reason: HOSPADM

## 2022-02-17 RX ORDER — ALBUTEROL SULFATE 0.83 MG/ML
2.5 SOLUTION RESPIRATORY (INHALATION) EVERY 6 HOURS PRN
Status: DISCONTINUED | OUTPATIENT
Start: 2022-02-17 | End: 2022-02-18 | Stop reason: HOSPADM

## 2022-02-17 RX ORDER — OXYCODONE HYDROCHLORIDE 5 MG/1
10 TABLET ORAL EVERY 4 HOURS PRN
Status: DISCONTINUED | OUTPATIENT
Start: 2022-02-17 | End: 2022-02-18 | Stop reason: HOSPADM

## 2022-02-17 RX ORDER — KETOROLAC TROMETHAMINE 30 MG/ML
INJECTION, SOLUTION INTRAMUSCULAR; INTRAVENOUS PRN
Status: DISCONTINUED | OUTPATIENT
Start: 2022-02-17 | End: 2022-02-17

## 2022-02-17 RX ORDER — NALOXONE HYDROCHLORIDE 0.4 MG/ML
0.4 INJECTION, SOLUTION INTRAMUSCULAR; INTRAVENOUS; SUBCUTANEOUS
Status: DISCONTINUED | OUTPATIENT
Start: 2022-02-17 | End: 2022-02-18 | Stop reason: HOSPADM

## 2022-02-17 RX ORDER — LIDOCAINE 40 MG/G
CREAM TOPICAL
Status: DISCONTINUED | OUTPATIENT
Start: 2022-02-17 | End: 2022-02-18 | Stop reason: HOSPADM

## 2022-02-17 RX ORDER — PROPOFOL 10 MG/ML
INJECTION, EMULSION INTRAVENOUS PRN
Status: DISCONTINUED | OUTPATIENT
Start: 2022-02-17 | End: 2022-02-17

## 2022-02-17 RX ORDER — OXYCODONE HYDROCHLORIDE 5 MG/1
5 TABLET ORAL EVERY 4 HOURS PRN
Status: DISCONTINUED | OUTPATIENT
Start: 2022-02-17 | End: 2022-02-18 | Stop reason: HOSPADM

## 2022-02-17 RX ORDER — NALOXONE HYDROCHLORIDE 0.4 MG/ML
0.2 INJECTION, SOLUTION INTRAMUSCULAR; INTRAVENOUS; SUBCUTANEOUS
Status: DISCONTINUED | OUTPATIENT
Start: 2022-02-17 | End: 2022-02-18 | Stop reason: HOSPADM

## 2022-02-17 RX ORDER — CYCLOBENZAPRINE HCL 10 MG
10 TABLET ORAL 3 TIMES DAILY PRN
Qty: 30 TABLET | Refills: 1 | Status: SHIPPED | OUTPATIENT
Start: 2022-02-17 | End: 2022-04-06

## 2022-02-17 RX ORDER — POLYETHYLENE GLYCOL 3350 17 G/17G
17 POWDER, FOR SOLUTION ORAL DAILY
Status: DISCONTINUED | OUTPATIENT
Start: 2022-02-18 | End: 2022-02-18 | Stop reason: HOSPADM

## 2022-02-17 RX ORDER — ONDANSETRON 2 MG/ML
4 INJECTION INTRAMUSCULAR; INTRAVENOUS EVERY 6 HOURS PRN
Status: DISCONTINUED | OUTPATIENT
Start: 2022-02-17 | End: 2022-02-18 | Stop reason: HOSPADM

## 2022-02-17 RX ORDER — CEFAZOLIN SODIUM IN 0.9 % NACL 3 G/100 ML
3 INTRAVENOUS SOLUTION, PIGGYBACK (ML) INTRAVENOUS
Status: COMPLETED | OUTPATIENT
Start: 2022-02-17 | End: 2022-02-17

## 2022-02-17 RX ORDER — ONDANSETRON 4 MG/1
4 TABLET, ORALLY DISINTEGRATING ORAL EVERY 6 HOURS PRN
Status: DISCONTINUED | OUTPATIENT
Start: 2022-02-17 | End: 2022-02-18 | Stop reason: HOSPADM

## 2022-02-17 RX ORDER — HYDROMORPHONE HCL IN WATER/PF 6 MG/30 ML
0.2 PATIENT CONTROLLED ANALGESIA SYRINGE INTRAVENOUS
Status: DISCONTINUED | OUTPATIENT
Start: 2022-02-17 | End: 2022-02-18 | Stop reason: HOSPADM

## 2022-02-17 RX ORDER — DEXAMETHASONE SODIUM PHOSPHATE 4 MG/ML
INJECTION, SOLUTION INTRA-ARTICULAR; INTRALESIONAL; INTRAMUSCULAR; INTRAVENOUS; SOFT TISSUE PRN
Status: DISCONTINUED | OUTPATIENT
Start: 2022-02-17 | End: 2022-02-17

## 2022-02-17 RX ORDER — FAMOTIDINE 20 MG/1
20 TABLET, FILM COATED ORAL 2 TIMES DAILY
Status: DISCONTINUED | OUTPATIENT
Start: 2022-02-17 | End: 2022-02-18 | Stop reason: HOSPADM

## 2022-02-17 RX ORDER — ACETAMINOPHEN 325 MG/1
1000 TABLET ORAL 3 TIMES DAILY
Refills: 0
Start: 2022-02-17 | End: 2023-06-02

## 2022-02-17 RX ORDER — AMOXICILLIN 250 MG
1-2 CAPSULE ORAL 2 TIMES DAILY
Qty: 30 TABLET | Refills: 0 | Status: SHIPPED | OUTPATIENT
Start: 2022-02-17 | End: 2022-03-16

## 2022-02-17 RX ORDER — ONDANSETRON 2 MG/ML
INJECTION INTRAMUSCULAR; INTRAVENOUS PRN
Status: DISCONTINUED | OUTPATIENT
Start: 2022-02-17 | End: 2022-02-17

## 2022-02-17 RX ORDER — CEFAZOLIN SODIUM 2 G/100ML
2 INJECTION, SOLUTION INTRAVENOUS EVERY 8 HOURS
Status: COMPLETED | OUTPATIENT
Start: 2022-02-17 | End: 2022-02-18

## 2022-02-17 RX ORDER — GABAPENTIN 300 MG/1
300 CAPSULE ORAL
Status: COMPLETED | OUTPATIENT
Start: 2022-02-17 | End: 2022-02-17

## 2022-02-17 RX ORDER — FENTANYL CITRATE 50 UG/ML
50 INJECTION, SOLUTION INTRAMUSCULAR; INTRAVENOUS EVERY 5 MIN PRN
Status: DISCONTINUED | OUTPATIENT
Start: 2022-02-17 | End: 2022-02-17 | Stop reason: HOSPADM

## 2022-02-17 RX ORDER — EPHEDRINE SULFATE 50 MG/ML
INJECTION, SOLUTION INTRAVENOUS PRN
Status: DISCONTINUED | OUTPATIENT
Start: 2022-02-17 | End: 2022-02-17

## 2022-02-17 RX ORDER — OXYCODONE HYDROCHLORIDE 5 MG/1
5 TABLET ORAL EVERY 4 HOURS PRN
Status: DISCONTINUED | OUTPATIENT
Start: 2022-02-17 | End: 2022-02-17 | Stop reason: HOSPADM

## 2022-02-17 RX ORDER — AMOXICILLIN 250 MG
1-2 CAPSULE ORAL 2 TIMES DAILY
Qty: 30 TABLET | Refills: 0
Start: 2022-02-17 | End: 2023-06-02

## 2022-02-17 RX ORDER — PHENYLEPHRINE HYDROCHLORIDE 10 MG/ML
INJECTION INTRAVENOUS PRN
Status: DISCONTINUED | OUTPATIENT
Start: 2022-02-17 | End: 2022-02-17

## 2022-02-17 RX ORDER — DIMENHYDRINATE 50 MG/ML
25 INJECTION, SOLUTION INTRAMUSCULAR; INTRAVENOUS
Status: DISCONTINUED | OUTPATIENT
Start: 2022-02-17 | End: 2022-02-17 | Stop reason: HOSPADM

## 2022-02-17 RX ORDER — HYDROXYZINE HYDROCHLORIDE 25 MG/1
25 TABLET, FILM COATED ORAL EVERY 6 HOURS PRN
Status: DISCONTINUED | OUTPATIENT
Start: 2022-02-17 | End: 2022-02-18 | Stop reason: HOSPADM

## 2022-02-17 RX ORDER — ARIPIPRAZOLE 15 MG/1
15 TABLET ORAL DAILY
Status: DISCONTINUED | OUTPATIENT
Start: 2022-02-17 | End: 2022-02-18 | Stop reason: HOSPADM

## 2022-02-17 RX ORDER — MAGNESIUM SULFATE HEPTAHYDRATE 40 MG/ML
INJECTION, SOLUTION INTRAVENOUS PRN
Status: DISCONTINUED | OUTPATIENT
Start: 2022-02-17 | End: 2022-02-17

## 2022-02-17 RX ORDER — KETOROLAC TROMETHAMINE 15 MG/ML
15 INJECTION, SOLUTION INTRAMUSCULAR; INTRAVENOUS EVERY 6 HOURS
Status: COMPLETED | OUTPATIENT
Start: 2022-02-17 | End: 2022-02-18

## 2022-02-17 RX ORDER — OXYCODONE HYDROCHLORIDE 5 MG/1
5 TABLET ORAL ONCE
Status: COMPLETED | OUTPATIENT
Start: 2022-02-17 | End: 2022-02-17

## 2022-02-17 RX ORDER — ONDANSETRON 2 MG/ML
4 INJECTION INTRAMUSCULAR; INTRAVENOUS EVERY 30 MIN PRN
Status: DISCONTINUED | OUTPATIENT
Start: 2022-02-17 | End: 2022-02-17 | Stop reason: HOSPADM

## 2022-02-17 RX ORDER — GLYCOPYRROLATE 0.2 MG/ML
INJECTION, SOLUTION INTRAMUSCULAR; INTRAVENOUS PRN
Status: DISCONTINUED | OUTPATIENT
Start: 2022-02-17 | End: 2022-02-17

## 2022-02-17 RX ORDER — LIDOCAINE 40 MG/G
CREAM TOPICAL
Status: DISCONTINUED | OUTPATIENT
Start: 2022-02-17 | End: 2022-02-17 | Stop reason: HOSPADM

## 2022-02-17 RX ORDER — IPRATROPIUM BROMIDE AND ALBUTEROL SULFATE 2.5; .5 MG/3ML; MG/3ML
3 SOLUTION RESPIRATORY (INHALATION) ONCE
Status: COMPLETED | OUTPATIENT
Start: 2022-02-17 | End: 2022-02-17

## 2022-02-17 RX ORDER — AMOXICILLIN 250 MG
1 CAPSULE ORAL 2 TIMES DAILY
Status: DISCONTINUED | OUTPATIENT
Start: 2022-02-17 | End: 2022-02-18 | Stop reason: HOSPADM

## 2022-02-17 RX ORDER — DEXTROSE MONOHYDRATE 25 G/50ML
25-50 INJECTION, SOLUTION INTRAVENOUS
Status: DISCONTINUED | OUTPATIENT
Start: 2022-02-17 | End: 2022-02-18 | Stop reason: HOSPADM

## 2022-02-17 RX ORDER — BISACODYL 10 MG
10 SUPPOSITORY, RECTAL RECTAL DAILY PRN
Status: DISCONTINUED | OUTPATIENT
Start: 2022-02-17 | End: 2022-02-18 | Stop reason: HOSPADM

## 2022-02-17 RX ORDER — IBUPROFEN 600 MG/1
600 TABLET, FILM COATED ORAL EVERY 6 HOURS PRN
Refills: 0
Start: 2022-02-17 | End: 2022-04-06

## 2022-02-17 RX ORDER — ACETAMINOPHEN 325 MG/1
975 TABLET ORAL ONCE
Status: DISCONTINUED | OUTPATIENT
Start: 2022-02-17 | End: 2022-02-17

## 2022-02-17 RX ORDER — LIDOCAINE HYDROCHLORIDE 10 MG/ML
INJECTION, SOLUTION INFILTRATION; PERINEURAL PRN
Status: DISCONTINUED | OUTPATIENT
Start: 2022-02-17 | End: 2022-02-17

## 2022-02-17 RX ORDER — OXYCODONE HYDROCHLORIDE 5 MG/1
5-10 TABLET ORAL EVERY 4 HOURS PRN
Qty: 30 TABLET | Refills: 0 | Status: SHIPPED | OUTPATIENT
Start: 2022-02-17 | End: 2022-07-21

## 2022-02-17 RX ORDER — PROCHLORPERAZINE MALEATE 5 MG
10 TABLET ORAL EVERY 6 HOURS PRN
Status: DISCONTINUED | OUTPATIENT
Start: 2022-02-17 | End: 2022-02-18 | Stop reason: HOSPADM

## 2022-02-17 RX ORDER — NICOTINE POLACRILEX 4 MG
15-30 LOZENGE BUCCAL
Status: DISCONTINUED | OUTPATIENT
Start: 2022-02-17 | End: 2022-02-18 | Stop reason: HOSPADM

## 2022-02-17 RX ORDER — ONDANSETRON 4 MG/1
4 TABLET, ORALLY DISINTEGRATING ORAL EVERY 30 MIN PRN
Status: DISCONTINUED | OUTPATIENT
Start: 2022-02-17 | End: 2022-02-17 | Stop reason: HOSPADM

## 2022-02-17 RX ORDER — METFORMIN HCL 500 MG
2000 TABLET, EXTENDED RELEASE 24 HR ORAL
Status: DISCONTINUED | OUTPATIENT
Start: 2022-02-18 | End: 2022-02-18 | Stop reason: HOSPADM

## 2022-02-17 RX ORDER — ACETAMINOPHEN 325 MG/1
650 TABLET ORAL EVERY 4 HOURS PRN
Status: DISCONTINUED | OUTPATIENT
Start: 2022-02-20 | End: 2022-02-18 | Stop reason: HOSPADM

## 2022-02-17 RX ORDER — ACETAMINOPHEN 325 MG/1
975 TABLET ORAL EVERY 8 HOURS
Status: DISCONTINUED | OUTPATIENT
Start: 2022-02-17 | End: 2022-02-18 | Stop reason: HOSPADM

## 2022-02-17 RX ORDER — SODIUM CHLORIDE, SODIUM LACTATE, POTASSIUM CHLORIDE, CALCIUM CHLORIDE 600; 310; 30; 20 MG/100ML; MG/100ML; MG/100ML; MG/100ML
INJECTION, SOLUTION INTRAVENOUS CONTINUOUS
Status: DISCONTINUED | OUTPATIENT
Start: 2022-02-17 | End: 2022-02-18 | Stop reason: HOSPADM

## 2022-02-17 RX ORDER — KETAMINE HYDROCHLORIDE 10 MG/ML
INJECTION, SOLUTION INTRAMUSCULAR; INTRAVENOUS PRN
Status: DISCONTINUED | OUTPATIENT
Start: 2022-02-17 | End: 2022-02-17

## 2022-02-17 RX ORDER — METHOCARBAMOL 750 MG/1
750 TABLET, FILM COATED ORAL EVERY 6 HOURS PRN
Status: DISCONTINUED | OUTPATIENT
Start: 2022-02-17 | End: 2022-02-18 | Stop reason: HOSPADM

## 2022-02-17 RX ORDER — FENTANYL CITRATE 50 UG/ML
INJECTION, SOLUTION INTRAMUSCULAR; INTRAVENOUS PRN
Status: DISCONTINUED | OUTPATIENT
Start: 2022-02-17 | End: 2022-02-17

## 2022-02-17 RX ORDER — HYDROMORPHONE HCL IN WATER/PF 6 MG/30 ML
0.4 PATIENT CONTROLLED ANALGESIA SYRINGE INTRAVENOUS
Status: DISCONTINUED | OUTPATIENT
Start: 2022-02-17 | End: 2022-02-18 | Stop reason: HOSPADM

## 2022-02-17 RX ORDER — CEFAZOLIN SODIUM IN 0.9 % NACL 3 G/100 ML
3 INTRAVENOUS SOLUTION, PIGGYBACK (ML) INTRAVENOUS SEE ADMIN INSTRUCTIONS
Status: DISCONTINUED | OUTPATIENT
Start: 2022-02-17 | End: 2022-02-17 | Stop reason: HOSPADM

## 2022-02-17 RX ADMIN — PHENYLEPHRINE HYDROCHLORIDE 200 MCG: 10 INJECTION INTRAVENOUS at 08:25

## 2022-02-17 RX ADMIN — KETOROLAC TROMETHAMINE 15 MG: 15 INJECTION, SOLUTION INTRAMUSCULAR; INTRAVENOUS at 16:04

## 2022-02-17 RX ADMIN — ROCURONIUM BROMIDE 50 MG: 50 INJECTION, SOLUTION INTRAVENOUS at 08:17

## 2022-02-17 RX ADMIN — PHENYLEPHRINE HYDROCHLORIDE 200 MCG: 10 INJECTION INTRAVENOUS at 07:50

## 2022-02-17 RX ADMIN — Medication 8 MCG: at 09:55

## 2022-02-17 RX ADMIN — PHENYLEPHRINE HYDROCHLORIDE 100 MCG: 10 INJECTION INTRAVENOUS at 08:05

## 2022-02-17 RX ADMIN — INSULIN ASPART 1 UNITS: 100 INJECTION, SOLUTION INTRAVENOUS; SUBCUTANEOUS at 18:17

## 2022-02-17 RX ADMIN — PHENYLEPHRINE HYDROCHLORIDE 100 MCG: 10 INJECTION INTRAVENOUS at 07:56

## 2022-02-17 RX ADMIN — Medication 12 MCG: at 09:42

## 2022-02-17 RX ADMIN — LIDOCAINE HYDROCHLORIDE 0.1 ML: 10 INJECTION, SOLUTION EPIDURAL; INFILTRATION; INTRACAUDAL; PERINEURAL at 06:38

## 2022-02-17 RX ADMIN — KETAMINE HYDROCHLORIDE 0.1 MG/KG/HR: 10 INJECTION INTRAMUSCULAR; INTRAVENOUS at 08:25

## 2022-02-17 RX ADMIN — EPHEDRINE SULFATE 5 MG: 50 INJECTION, SOLUTION INTRAVENOUS at 07:51

## 2022-02-17 RX ADMIN — SUGAMMADEX 400 MG: 100 INJECTION, SOLUTION INTRAVENOUS at 10:16

## 2022-02-17 RX ADMIN — PHENYLEPHRINE HYDROCHLORIDE 100 MCG: 10 INJECTION INTRAVENOUS at 08:03

## 2022-02-17 RX ADMIN — ROCURONIUM BROMIDE 20 MG: 50 INJECTION, SOLUTION INTRAVENOUS at 09:26

## 2022-02-17 RX ADMIN — Medication 12 MCG: at 09:53

## 2022-02-17 RX ADMIN — ACETAMINOPHEN 975 MG: 325 TABLET, FILM COATED ORAL at 06:39

## 2022-02-17 RX ADMIN — Medication 8 MCG: at 09:48

## 2022-02-17 RX ADMIN — Medication 3 G: at 07:41

## 2022-02-17 RX ADMIN — KETAMINE HYDROCHLORIDE 0.1 MG: 10 INJECTION INTRAMUSCULAR; INTRAVENOUS at 09:57

## 2022-02-17 RX ADMIN — PROPOFOL 50 MG: 10 INJECTION, EMULSION INTRAVENOUS at 08:17

## 2022-02-17 RX ADMIN — SODIUM CHLORIDE, POTASSIUM CHLORIDE, SODIUM LACTATE AND CALCIUM CHLORIDE: 600; 310; 30; 20 INJECTION, SOLUTION INTRAVENOUS at 08:44

## 2022-02-17 RX ADMIN — EPHEDRINE SULFATE 10 MG: 50 INJECTION, SOLUTION INTRAVENOUS at 08:03

## 2022-02-17 RX ADMIN — GABAPENTIN 300 MG: 300 CAPSULE ORAL at 06:39

## 2022-02-17 RX ADMIN — ACETAMINOPHEN 975 MG: 325 TABLET, FILM COATED ORAL at 14:55

## 2022-02-17 RX ADMIN — DEXAMETHASONE SODIUM PHOSPHATE 10 MG: 4 INJECTION, SOLUTION INTRA-ARTICULAR; INTRALESIONAL; INTRAMUSCULAR; INTRAVENOUS; SOFT TISSUE at 08:05

## 2022-02-17 RX ADMIN — OXYCODONE HYDROCHLORIDE 5 MG: 5 TABLET ORAL at 20:12

## 2022-02-17 RX ADMIN — SENNOSIDES AND DOCUSATE SODIUM 1 TABLET: 50; 8.6 TABLET ORAL at 20:12

## 2022-02-17 RX ADMIN — PHENYLEPHRINE HYDROCHLORIDE 200 MCG: 10 INJECTION INTRAVENOUS at 07:59

## 2022-02-17 RX ADMIN — MAGNESIUM SULFATE HEPTAHYDRATE 2 G: 40 INJECTION, SOLUTION INTRAVENOUS at 09:02

## 2022-02-17 RX ADMIN — ACETAMINOPHEN 975 MG: 325 TABLET, FILM COATED ORAL at 22:36

## 2022-02-17 RX ADMIN — GLYCOPYRROLATE 0.1 MG: 0.2 INJECTION, SOLUTION INTRAMUSCULAR; INTRAVENOUS at 08:25

## 2022-02-17 RX ADMIN — PHENYLEPHRINE HYDROCHLORIDE 200 MCG: 10 INJECTION INTRAVENOUS at 08:13

## 2022-02-17 RX ADMIN — KETAMINE HYDROCHLORIDE 50 MG: 10 INJECTION INTRAMUSCULAR; INTRAVENOUS at 07:43

## 2022-02-17 RX ADMIN — HYDROMORPHONE HYDROCHLORIDE 0.4 MG: 0.2 INJECTION, SOLUTION INTRAMUSCULAR; INTRAVENOUS; SUBCUTANEOUS at 11:08

## 2022-02-17 RX ADMIN — ARIPIPRAZOLE 15 MG: 15 TABLET ORAL at 17:42

## 2022-02-17 RX ADMIN — PROPOFOL 200 MG: 10 INJECTION, EMULSION INTRAVENOUS at 07:43

## 2022-02-17 RX ADMIN — MIDAZOLAM HYDROCHLORIDE 5 MG: 1 INJECTION, SOLUTION INTRAMUSCULAR; INTRAVENOUS at 07:41

## 2022-02-17 RX ADMIN — HYDROMORPHONE HYDROCHLORIDE 0.5 MG: 1 INJECTION, SOLUTION INTRAMUSCULAR; INTRAVENOUS; SUBCUTANEOUS at 09:34

## 2022-02-17 RX ADMIN — FENTANYL CITRATE 100 MCG: 50 INJECTION, SOLUTION INTRAMUSCULAR; INTRAVENOUS at 08:17

## 2022-02-17 RX ADMIN — ROCURONIUM BROMIDE 30 MG: 50 INJECTION, SOLUTION INTRAVENOUS at 07:43

## 2022-02-17 RX ADMIN — PHENYLEPHRINE HYDROCHLORIDE 200 MCG: 10 INJECTION INTRAVENOUS at 07:53

## 2022-02-17 RX ADMIN — HYDROMORPHONE HYDROCHLORIDE 0.5 MG: 1 INJECTION, SOLUTION INTRAMUSCULAR; INTRAVENOUS; SUBCUTANEOUS at 08:33

## 2022-02-17 RX ADMIN — ONDANSETRON 4 MG: 2 INJECTION INTRAMUSCULAR; INTRAVENOUS at 10:00

## 2022-02-17 RX ADMIN — EPHEDRINE SULFATE 10 MG: 50 INJECTION, SOLUTION INTRAVENOUS at 07:57

## 2022-02-17 RX ADMIN — LIDOCAINE HYDROCHLORIDE 100 MG: 10 INJECTION, SOLUTION INFILTRATION; PERINEURAL at 07:43

## 2022-02-17 RX ADMIN — CEFAZOLIN SODIUM 2 G: 10 INJECTION, POWDER, FOR SOLUTION INTRAVENOUS at 16:05

## 2022-02-17 RX ADMIN — OXYCODONE HYDROCHLORIDE 5 MG: 5 TABLET ORAL at 11:08

## 2022-02-17 RX ADMIN — PHENYLEPHRINE HYDROCHLORIDE 100 MCG: 10 INJECTION INTRAVENOUS at 08:01

## 2022-02-17 RX ADMIN — SODIUM CHLORIDE, POTASSIUM CHLORIDE, SODIUM LACTATE AND CALCIUM CHLORIDE: 600; 310; 30; 20 INJECTION, SOLUTION INTRAVENOUS at 06:37

## 2022-02-17 RX ADMIN — KETOROLAC TROMETHAMINE 30 MG: 30 INJECTION, SOLUTION INTRAMUSCULAR at 10:10

## 2022-02-17 RX ADMIN — FAMOTIDINE 20 MG: 20 TABLET ORAL at 20:11

## 2022-02-17 RX ADMIN — IPRATROPIUM BROMIDE AND ALBUTEROL SULFATE 3 ML: .5; 3 SOLUTION RESPIRATORY (INHALATION) at 06:43

## 2022-02-17 RX ADMIN — KETOROLAC TROMETHAMINE 15 MG: 15 INJECTION, SOLUTION INTRAMUSCULAR; INTRAVENOUS at 22:37

## 2022-02-17 RX ADMIN — ASPIRIN 325 MG: 325 TABLET ORAL at 17:04

## 2022-02-17 RX ADMIN — FENTANYL CITRATE 150 MCG: 50 INJECTION, SOLUTION INTRAMUSCULAR; INTRAVENOUS at 07:43

## 2022-02-17 RX ADMIN — KETAMINE HYDROCHLORIDE 20 MG: 10 INJECTION INTRAMUSCULAR; INTRAVENOUS at 08:25

## 2022-02-17 RX ADMIN — OXYCODONE HYDROCHLORIDE 5 MG: 5 TABLET ORAL at 11:55

## 2022-02-17 ASSESSMENT — ACTIVITIES OF DAILY LIVING (ADL)
DIFFICULTY_EATING/SWALLOWING: NO
FALL_HISTORY_WITHIN_LAST_SIX_MONTHS: NO
HEARING_DIFFICULTY_OR_DEAF: NO
WEAR_GLASSES_OR_BLIND: NO
TOILETING_ISSUES: NO
CONCENTRATING,_REMEMBERING_OR_MAKING_DECISIONS_DIFFICULTY: NO
TRANSFERRING: 0-->INDEPENDENT
EQUIPMENT_CURRENTLY_USED_AT_HOME: GRAB BAR, TUB/SHOWER
DRESSING/BATHING_DIFFICULTY: NO
TRANSFERRING: 0-->INDEPENDENT
DOING_ERRANDS_INDEPENDENTLY_DIFFICULTY: NO
CHANGE_IN_FUNCTIONAL_STATUS_SINCE_ONSET_OF_CURRENT_ILLNESS/INJURY: NO
DIFFICULTY_COMMUNICATING: NO
WALKING_OR_CLIMBING_STAIRS_DIFFICULTY: NO

## 2022-02-17 NOTE — PROGRESS NOTES
"[facility] ADMISSION NOTE    Patient admitted to room 2213 at approximately 1330 via cart from surgery. Patient was accompanied by transport tech.     Verbal SBAR report received from Carlos prior to patient arrival.     Patient ambulated to bed with one assist. Patient alert and oriented X 3. The patient is not having any pain.  . Admission vital signs: Blood pressure 133/76, pulse 77, temperature 98.6  F (37  C), temperature source Oral, resp. rate 22, height 1.855 m (6' 1.03\"), weight (!) 197.2 kg (434 lb 12 oz), SpO2 (!) 5 %. Patient was oriented to plan of care, call light, bed controls, tv, telephone, bathroom and visiting hours.     Risk Assessment    The following safety risks were identified during admission: none. Yellow risk band applied: NO.     Skin Initial Assessment    This writer admitted this patient and completed a full skin assessment and Balbir score in the Adult PCS flowsheet. Appropriate interventions initiated as needed.     Secondary skin check completed by Rojelio.    Balbir Risk Assessment  Sensory Perception: 3-->slightly limited  Moisture: 4-->rarely moist  Activity: 3-->walks occasionally  Mobility: 3-->slightly limited  Nutrition: 3-->adequate  Friction and Shear: 2-->potential problem  Balbir Score: 18  Friction/Shear Interventions: HOB 30 degrees or less  Mattress: Standard Hospital Mattress (Foam)  Bed Frame: Standard width and length    Education    Patient has a [place] to Observation order: Yes  Observation education completed and documented: Yes      Adelso Toledo RN    "

## 2022-02-17 NOTE — DISCHARGE INSTRUCTIONS
ROTATOR CUFF REPAIR    ACTIVITY:   Sleeping may be difficult for a few weeks after surgery. Avoid sleeping on the operative side. You may be more comfortable sleeping in a reclining chair, or propped up with pillows in bed. At night, place a pillow behind your operative arm to keep the arm in front of your body.     Range of motion of operative extremity. Within 2-3 days of your surgical procedure you should begin daily range of motion exercises for your operative arm. Three times a day you should loosen the wrist and elbow straps of the immobilizer and move your fingers, wrist and elbow through a range of motion. This will help limit the swelling and stiffness associated with keeping the arm in the sling after surgery.    To shower, have someone help you remove the immobilizer. Keep your arm at your side as if you re holding a newspaper between your arm and side. It is ok to straighten your elbow as this does not move your shoulder. To clean your armpit, lean forward bending at the waist. As you do this, your arm will fall away from your side enough to allow you to wash. Doing it this way allows you to wash your armpit without using any of the muscles that were repaired during your surgery. Have someone help you get dressed then re-apply the immobilizer over your clothes.    Don t drive until we have seen you for your post-op appointment, as it might be difficult to control the vehicle. You will also need to be off any narcotic medication before driving.     PAIN CONTROL:  You likely received a peripheral nerve block for this procedure.  You usually will be unable to feel or move your shoulder/arm for between 6-12 hours post operatively.  Make sure you start taking your oral pain medications BEFORE you are able to start feeling your shoulder/arm and continue to take your pain medication as scheduled for the first 48 hours to keep ahead of the post operative pain.      Once you get home, put ice on the shoulder for  at least 20 minutes on then 20 minutes off. NEVER  put ice directly on the skin as this may cause frostbite.  Use the ice pretty regularly for the first 2 weeks and then only as needed. The first day or so after surgery, take the pain medicine pretty regularly.  Don t wait until the pain is real bad as this makes pain more difficult to control.     DRESSING MANAGEMENT:  1. A sterile operative dressing has been applied to your shoulder. If possible, do not alter or change it for the first 48 hours following surgery. If it comes loose or is soiled, then it may be changed with new gauze and tape.  2. The shoulder is filled with water to perform your surgery. It is normal for the shoulder to drain/ooze water for the first day or so. This is usually a blood tinged color so don t be alarmed.  3. You may shower 48 hours after surgery if there is no drainage from your incisions.   Take care to limit the movement of your arm as you remove your dressings. After the shower pat the shoulder dry and place new gauze and tape over your incisions.   4. Do not soak the shoulder in bathtub, hot tub, or pool until you are told it is ok.  5. Do not remove steri-strips or sutures as these will be removed at your first post op visit.    PHYSICAL THERAPY:  Although your rotator cuff has been repaired, it is at risk of re-tearing until it is fully healed. It is most vulnerable during the first 6 weeks after surgery. We will give you instructions on your limitations and restrictions at your post op visits. For now, we want very little shoulder motion. It is important, however, that you gently move your elbow, hand and wrist to reduce swelling and prevent stiffness. *Absolutely no pushing, pulling, or lifting with your operative arm. Nothing heavier than a coffee cup should be held in your operative arm.    You may remove the immobilizer to shower, but it should be worn full time  otherwise.   Any concerns after surgery should be directed to  our office M-Th 7:30-5:00, F 7:30-4:30. Evenings and weekend we have an on-call physician at [phone] and [facility] located at:       [address] [place] [address]  [phone]    Any questions, please do not hesitate to call our office.        [address] [place] [id number]  PHONE:  [phone]  FAX:  [phone]

## 2022-02-17 NOTE — PROGRESS NOTES
"Report called and given to Adelso Rn for transfer of care. Dad \"Boni\" and reported poc, pt. Transported via cart on 5L NC, with 2 Perla at [address], RN on 2/17/2022 at 1:14 PM    "

## 2022-02-17 NOTE — ANESTHESIA POSTPROCEDURE EVALUATION
Patient: Ciro Monet    Procedure: Procedure(s):  Right Shoulder Arthroscopy , Intraarticular Debridement, Subachromial Decompression, Biceps Tenodesis, Rotator Cuff Repair       Diagnosis:Rotator cuff tear [M75.100]  Diagnosis Additional Information: No value filed.    Anesthesia Type:  General    Note:  Disposition: Inpatient   Postop Pain Control: Uneventful            Sign Out: Well controlled pain   PONV: No   Neuro/Psych: Uneventful            Sign Out: Acceptable/Baseline neuro status   Airway/Respiratory: Uneventful            Sign Out: Acceptable/Baseline resp. status   CV/Hemodynamics: Uneventful            Sign Out: Acceptable CV status; No obvious hypovolemia; No obvious fluid overload   Other NRE: NONE   DID A NON-ROUTINE EVENT OCCUR? No           Last vitals:  Vitals Value Taken Time   /79 02/17/22 1215   Temp 36.8  C (98.3  F) 02/17/22 1045   Pulse 70 02/17/22 1216   Resp 18 02/17/22 1216   SpO2 93 % 02/17/22 1216   Vitals shown include unvalidated device data.    Electronically Signed By: MARIA INES Camilo CRNA  February 17, 2022  1:58 PM

## 2022-02-17 NOTE — ANESTHESIA CARE TRANSFER NOTE
Patient: Ciro Monet    Procedure: Procedure(s):  Right Shoulder Arthroscopy , Intraarticular Debridement, Subachromial Decompression, Biceps Tenodesis, Rotator Cuff Repair       Diagnosis: Rotator cuff tear [M75.100]  Diagnosis Additional Information: No value filed.    Anesthesia Type:   General     Note:    Oropharynx: oropharynx clear of all foreign objects and spontaneously breathing  Level of Consciousness: drowsy  Oxygen Supplementation: face mask  Level of Supplemental Oxygen (L/min / FiO2): 6  Independent Airway: airway patency satisfactory and stable  Dentition: dentition unchanged  Vital Signs Stable: post-procedure vital signs reviewed and stable  Report to RN Given: handoff report given  Patient transferred to: PACU    Handoff Report: Identifed the Patient, Identified the Reponsible Provider, Reviewed the pertinent medical history, Discussed the surgical course, Reviewed Intra-OP anesthesia mangement and issues during anesthesia, Set expectations for post-procedure period and Allowed opportunity for questions and acknowledgement of understanding      Vitals:  Vitals Value Taken Time   /83 02/17/22 1046   Temp     Pulse 89 02/17/22 1051   Resp 24 02/17/22 1051   SpO2 92 % 02/17/22 1051   Vitals shown include unvalidated device data.    Electronically Signed By: Gil Black  February 17, 2022  10:51 AM

## 2022-02-17 NOTE — PROGRESS NOTES
Ambulated with SBA to bathroom, stood for several minutes unable to void at this time, will try again, urinal is at bedside. Just starting to feeel some discomfort in shoulder, sched pain med just given

## 2022-02-17 NOTE — PLAN OF CARE
Skin affirmation note    Admitting nurse completed full skin assessment, Balbir score and Balbir interventions. This writer agrees with the initial skin assessment findings.

## 2022-02-17 NOTE — ANESTHESIA PROCEDURE NOTES
Airway       Patient location during procedure: OR       Procedure Start/Stop Times: 2/17/2022 7:48 AM  Staff -        CRNA: Diandra Caballero APRN CRNA       Other Anesthesia Staff: Gil Black       Performed By: CRNA and SRNA  Consent for Airway        Urgency: elective  Indications and Patient Condition       Indications for airway management: misael-procedural       Induction type:intravenous       Mask difficulty assessment: 2 - vent by mask + OA or adjuvant +/- NMBA    Final Airway Details       Final airway type: endotracheal airway       Successful airway: ETT - single  Endotracheal Airway Details        ETT size (mm): 8.0       Cuffed: yes       Cuff volume (mL): 7       Successful intubation technique: video laryngoscopy       VL Blade Size: Newberry 4       Grade View of Cords: 1       Adjucts: stylet       Position: Left       Measured from: lips       Secured at (cm): 25       Bite block used: None    Post intubation assessment        Placement verified by: capnometry, equal breath sounds and chest rise        Number of attempts at approach: 1       Number of other approaches attempted: 0       Secured with: silk tape       Ease of procedure: easy       Dentition: Intact and Unchanged

## 2022-02-18 ENCOUNTER — APPOINTMENT (OUTPATIENT)
Dept: OCCUPATIONAL THERAPY | Facility: CLINIC | Age: 52
End: 2022-02-18
Attending: ORTHOPAEDIC SURGERY
Payer: COMMERCIAL

## 2022-02-18 VITALS
HEART RATE: 73 BPM | BODY MASS INDEX: 41.75 KG/M2 | HEIGHT: 73 IN | RESPIRATION RATE: 21 BRPM | SYSTOLIC BLOOD PRESSURE: 116 MMHG | WEIGHT: 315 LBS | TEMPERATURE: 98 F | DIASTOLIC BLOOD PRESSURE: 80 MMHG | OXYGEN SATURATION: 92 %

## 2022-02-18 LAB
ANION GAP SERPL CALCULATED.3IONS-SCNC: 5 MMOL/L (ref 3–14)
BUN SERPL-MCNC: 15 MG/DL (ref 7–30)
CALCIUM SERPL-MCNC: 8.4 MG/DL (ref 8.5–10.1)
CHLORIDE BLD-SCNC: 104 MMOL/L (ref 94–109)
CO2 SERPL-SCNC: 28 MMOL/L (ref 20–32)
CREAT SERPL-MCNC: 0.69 MG/DL (ref 0.66–1.25)
FASTING STATUS PATIENT QL REPORTED: NORMAL
GFR SERPL CREATININE-BSD FRML MDRD: >90 ML/MIN/1.73M2
GLUCOSE BLD-MCNC: 95 MG/DL (ref 70–99)
GLUCOSE BLD-MCNC: 95 MG/DL (ref 70–99)
GLUCOSE BLDC GLUCOMTR-MCNC: 106 MG/DL (ref 70–99)
POTASSIUM BLD-SCNC: 3.7 MMOL/L (ref 3.4–5.3)
SODIUM SERPL-SCNC: 137 MMOL/L (ref 133–144)

## 2022-02-18 PROCEDURE — 80048 BASIC METABOLIC PNL TOTAL CA: CPT | Performed by: PHYSICIAN ASSISTANT

## 2022-02-18 PROCEDURE — 250N000011 HC RX IP 250 OP 636: Performed by: ORTHOPAEDIC SURGERY

## 2022-02-18 PROCEDURE — 97110 THERAPEUTIC EXERCISES: CPT | Mod: GO

## 2022-02-18 PROCEDURE — 250N000013 HC RX MED GY IP 250 OP 250 PS 637: Performed by: ORTHOPAEDIC SURGERY

## 2022-02-18 PROCEDURE — 250N000013 HC RX MED GY IP 250 OP 250 PS 637: Performed by: PHYSICIAN ASSISTANT

## 2022-02-18 PROCEDURE — 36415 COLL VENOUS BLD VENIPUNCTURE: CPT | Performed by: PHYSICIAN ASSISTANT

## 2022-02-18 PROCEDURE — 82962 GLUCOSE BLOOD TEST: CPT

## 2022-02-18 PROCEDURE — 97535 SELF CARE MNGMENT TRAINING: CPT | Mod: GO

## 2022-02-18 PROCEDURE — 97165 OT EVAL LOW COMPLEX 30 MIN: CPT | Mod: GO

## 2022-02-18 RX ADMIN — CEFAZOLIN SODIUM 2 G: 10 INJECTION, POWDER, FOR SOLUTION INTRAVENOUS at 00:01

## 2022-02-18 RX ADMIN — ARIPIPRAZOLE 15 MG: 15 TABLET ORAL at 10:05

## 2022-02-18 RX ADMIN — ASPIRIN 325 MG: 325 TABLET ORAL at 09:45

## 2022-02-18 RX ADMIN — KETOROLAC TROMETHAMINE 15 MG: 15 INJECTION, SOLUTION INTRAMUSCULAR; INTRAVENOUS at 03:52

## 2022-02-18 RX ADMIN — ACETAMINOPHEN 975 MG: 325 TABLET, FILM COATED ORAL at 09:45

## 2022-02-18 RX ADMIN — POLYETHYLENE GLYCOL 3350 17 G: 17 POWDER, FOR SOLUTION ORAL at 09:52

## 2022-02-18 RX ADMIN — METFORMIN HYDROCHLORIDE 2000 MG: 500 TABLET, EXTENDED RELEASE ORAL at 09:44

## 2022-02-18 RX ADMIN — KETOROLAC TROMETHAMINE 15 MG: 15 INJECTION, SOLUTION INTRAMUSCULAR; INTRAVENOUS at 09:45

## 2022-02-18 RX ADMIN — HYDROXYZINE HYDROCHLORIDE 25 MG: 25 TABLET, FILM COATED ORAL at 03:52

## 2022-02-18 RX ADMIN — SENNOSIDES AND DOCUSATE SODIUM 1 TABLET: 50; 8.6 TABLET ORAL at 09:45

## 2022-02-18 RX ADMIN — OXYCODONE HYDROCHLORIDE 5 MG: 5 TABLET ORAL at 03:52

## 2022-02-18 RX ADMIN — FAMOTIDINE 20 MG: 20 TABLET ORAL at 09:45

## 2022-02-18 NOTE — PLAN OF CARE
Goal Outcome Evaluation:    Plan of Care Reviewed With: patient.          Outcome Evaluation:   Patient vital signs are at baseline: Yes  Patient able to ambulate as they were prior to admission or with assist devices provided by therapies during their stay: Pt up with one assist. Shoulder immobilizer on. Moving well in bed.   Patient MUST void prior to discharge:  Yes  Patient able to tolerate oral intake:  Yes  Pain has adequate pain control using Oral analgesics:  Yes. IV Toradol is scheduled.

## 2022-02-18 NOTE — PLAN OF CARE
"Pt. A & O x 4, Ambulated to restroom did well, Last urine of 175 around 1900. Called RT to help adjust CPAP, pt O2 sat from 82% to 96% while sleeping, 5 LPM this shift. Pain has been 4 or less, iced and supported with pillow. No insuline at HS  /69   Pulse 79   Temp 98.7  F (37.1  C) (Oral)   Resp 20   Ht 1.855 m (6' 1.03\")   Wt (!) 197.2 kg (434 lb 12 oz)   SpO2 92%   BMI 57.31 kg/m          "

## 2022-02-18 NOTE — PLAN OF CARE
Occupational Therapy Discharge Summary    Reason for therapy discharge:    All goals and outcomes met, no further needs identified.    Progress towards therapy goal(s). See goals on Care Plan in Epic electronic health record for goal details.  Goals met  Ambulates within room and in hallway with SBA. Min A for upper body and lower body dressing- states parents can assist upon discharge.   Per pt does not need to complete stairs as he can stay on main floor and sleep in recliner.     Therapy recommendation(s):    May benefit from home OT to further increase independence with UB/LB dressing within precautions and trial of AE. Per pt; parents can assist upon discharge.

## 2022-02-18 NOTE — PROGRESS NOTES
02/18/22 1100   Quick Adds   Type of Visit Initial Occupational Therapy Evaluation   Living Environment   Living Environment Comments Pt normally lives alone,however plans to stay with his parents upon discharge. They live in a home. Is able to stay on main floor in recliner initially.    Self-Care   Fall history within last six months no   Activity/Exercise/Self-Care Comment Prior to surgery: independent with ADLs/IADLs    Instrumental Activities of Daily Living (IADL)   IADL Comments states parents can assist upon discharge.    General Information   Onset of Illness/Injury or Date of Surgery 02/17/22   Patient/Family Therapy Goal Statement (OT) to return home today    Additional Occupational Profile Info/Pertinent History of Current Problem Right Shoulder Arthroscopy , Intra articular Debridement, Subacromial Decompression, Biceps Tenodesis, Rotator Cuff Repair   Right Upper Extremity (Weight-bearing Status) non weight-bearing (NWB)   General Observations and Info No ROM of the right shoulder, sling 24/7 except for showering. Ok to do right elbow and hand ROM with shoulder support.   Cognitive Status Examination   Orientation Status orientation to person, place and time   Pain Assessment   Patient Currently in Pain Yes, see Vital Sign flowsheet  (controlled)   Bed Mobility   Comment (Bed Mobility) Min A for supine to sit- pt initially putting weight through R UE.    Transfers   Transfer Comments SBA   Balance   Balance Comments Ambulates within room and out in hallway with SBA    Activities of Daily Living   BADL Assessment/Intervention upper body dressing;lower body dressing   Upper Body Dressing Assessment/Training   [place] Level (Upper Body Dressing) minimum assist (75% patient effort)   Lower Body Dressing Assessment/Training   [place] Level (Lower Body Dressing) minimum assist (75% patient effort)   Clinical Impression   Criteria for Skilled Therapeutic Interventions Met (OT) Yes, treatment  indicated   OT Diagnosis decreased independence with ADLS    OT Problem List-Impairments impacting ADL post-surgical precautions   Assessment of Occupational Performance 1-3 Performance Deficits   Identified Performance Deficits dressing    Planned Therapy Interventions (OT) ADL retraining;ROM   Clinical Decision Making Complexity (OT) low complexity   Risk & Benefits of therapy have been explained evaluation/treatment results reviewed;care plan/treatment goals reviewed;risks/benefits reviewed;current/potential barriers reviewed;participants voiced agreement with care plan;participants included;patient   OT Discharge Planning   OT Discharge Recommendation (DC Rec) home with assist;home with home care occupational therapy   OT Rationale for DC Rec Home OT to advance independence with ADLs within shoulder precautions. Parents can initially assist upon discharge.    Total Evaluation Time (Minutes)   Total Evaluation Time (Minutes) 10

## 2022-02-18 NOTE — PLAN OF CARE
[facility]      OUTPATIENT OCCUPATIONAL THERAPY  EVALUATION  PLAN OF TREATMENT FOR OUTPATIENT REHABILITATION  (COMPLETE FOR INITIAL CLAIMS ONLY)  Patient's Last Name, First Name, M.I.  YOB: 1970  Ciro Monet                          Provider's Name  [facility] Medical Record No.  [id number]                               Onset Date:  02/17/22   Start of Care Date:   2/18/22     Type:     ___PT   _X_OT   ___SLP Medical Diagnosis:                        S/p R TSA   OT Diagnosis:  decreased independence with ADLS    Visits from SOC:  1   _________________________________________________________________________________  Plan of Treatment/Functional Goals    Planned Interventions: ADL retraining, ROM   Goals: See Occupational Therapy Goals on Care Plan in [facility] electronic health record.    Therapy Frequency: One time eval and treatment  Predicted Duration of Therapy Intervention: 02/18/22  _________________________________________________________________________________    I CERTIFY THE NEED FOR THESE SERVICES FURNISHED UNDER        THIS PLAN OF TREATMENT AND WHILE UNDER MY CARE     (Physician co-signature of this document indicates review and certification of the therapy plan).               ,      Referring Physician: Meir Foley PA-C            Initial Assessment        See Occupational Therapy evaluation dated   in Epic electronic health record.

## 2022-02-18 NOTE — PROGRESS NOTES
"[facility] Progress Note      Post-operative Day: 1 Day Post-Op    Procedure(s):  Right Shoulder Arthroscopy , Intra articular Debridement, Subacromial Decompression, Biceps Tenodesis, Rotator Cuff Repair  Subjective:    Pt reports mild pain in the right shoulder, the block wore off and he has full use of the right hand. He understands the plan for limited shoulder motion and OT consult today.     Chest pain, SOB:  No  Nausea, vomiting:  No  Lightheadedness, dizziness:  No  Neuro:  Patient denies new onset numbness or paresthesias      Objective:  Blood pressure 116/80, pulse 73, temperature 98  F (36.7  C), temperature source Oral, resp. rate 21, height 1.855 m (6' 1.03\"), weight (!) 197.2 kg (434 lb 12 oz), SpO2 92 %.    Patient Vitals for the past 24 hrs:   BP Temp Temp src Pulse Resp SpO2 Height Weight   02/18/22 0612 116/80 98  F (36.7  C) Oral 73 21 92 % -- --   02/18/22 0000 -- -- -- -- -- 92 % -- --   02/17/22 2200 112/69 98.7  F (37.1  C) Oral 79 20 -- -- --   02/17/22 1925 106/62 98.2  F (36.8  C) Oral 78 22 92 % -- --   02/17/22 1604 130/80 97.9  F (36.6  C) Oral 78 24 -- -- --   02/17/22 1500 -- 98.6  F (37  C) Oral 77 22 -- -- --   02/17/22 1412 133/76 98.6  F (37  C) Oral 80 24 (!) 5 % 1.855 m (6' 1.03\") (!) 197.2 kg (434 lb 12 oz)   02/17/22 1245 -- -- -- 75 18 92 % -- --   02/17/22 1230 122/82 -- -- 77 22 -- -- --   02/17/22 1215 128/79 -- -- 75 16 -- -- --   02/17/22 1200 121/83 -- -- 75 16 -- -- --   02/17/22 1145 130/75 -- -- 78 19 95 % -- --   02/17/22 1130 131/83 -- -- 77 16 96 % -- --   02/17/22 1115 119/80 -- -- 80 19 97 % -- --   02/17/22 1100 124/77 -- -- 86 20 -- -- --   02/17/22 1045 129/83 98.3  F (36.8  C) Oral 94 20 90 % -- --       Wt Readings from Last 4 Encounters:   02/17/22 (!) 197.2 kg (434 lb 12 oz)   01/26/22 (!) 193.9 kg (427 lb 8 oz)   09/29/21 (!) 189.1 kg (417 lb)   05/05/21 (!) 186.9 kg (412 lb)       Gen: A&O x 3. NAD. Appears comfortable.   Wound status: " Covered, post op gauze dressings are c/d/i.   Circulation, motion and sensation: Hand and wrist ROM intact and equal bilaterally; distal upper extremity sensation is intact and equal bilaterally. Fingers are warm and well perfused.    Swelling: Mild      Pertinent Labs   Lab Results: personally reviewed.     Recent Labs   Lab Test 02/18/22  0504 02/17/22  0614 01/26/22  1747 09/29/21  1548 09/28/20  1658 06/26/17  1535 11/03/16  0703   INR  --  0.97  --   --   --   --   --    HGB  --  14.6  --  14.3 14.8   < > 12.5*   HCT  --  44.6  --  41.9  --   --  37.9*   MCV  --  92  --  91  --   --  92   PLT  --  227  --  224  --   --  282     --  141 136 134   < >  --     < > = values in this interval not displayed.       Plan:   Continue current cares and rehabilitation.  Anticoagulation protocol: ASA 325mg daily x 42  days  Pain medications:  scopainmedication: oxycodone and tylenol  Weight bearing status:  NWB RUE  No ROM of the right shoulder, sling 24/7 except for showering. Ok to do right elbow and hand ROM with shoulder support. OT to see.   Disposition:  Plan for discharge to home when medically stable and pain is controlled, cleared by therapy. Likely later today.             Report completed by:  Meir Foley PA-C  Date: 2/18/2022  Time: 10:00 AM

## 2022-02-18 NOTE — DISCHARGE SUMMARY
[facility] Discharge Summary                                  [facility]     LETY BUSBY [id number]   Age: 51 year old  PCP: Keegan Morgan, [phone] 1970     Date of Admission:  2/17/2022  Date of Discharge::  2/18/2022  Discharge Physician:  Meir Foley PA-C    Code status:  Full Code    Admission Information:  Admission Diagnosis:  Rotator cuff tear [M75.100]  Nontraumatic complete tear of right rotator cuff [M75.121]    Post-Operative Day: 1 Day Post-Op     Reason for admission:  The patient was admitted for the following:Procedure(s) (LRB):  Right Shoulder Arthroscopy , Intra articular Debridement, Subacromial Decompression, Biceps Tenodesis, Rotator Cuff Repair (Right)    Active Problems:    Nontraumatic complete tear of right rotator cuff      Allergies:  Adhesive tape and Pine    Following the procedure noted above the patient was transferred to the post-op floor and started on:    Therapy:  occupational therapy  Anticoagulation Plan: ASA 325mg daily for 42 days  Pain Management: oxycodone and tylenol  Weight bearing status: Non-weight bearing     The patient was followed and co-managed by the hospitalist service during the inpatient treatment course  Complications:  None  Consultations:  None     Pertinent Labs   Lab Results: personally reviewed.     Recent Labs   Lab Test 02/18/22  0504 02/17/22  0614 01/26/22  1747 09/29/21  1548 09/28/20  1658 06/26/17  1535 11/03/16  0703   INR  --  0.97  --   --   --   --   --    HGB  --  14.6  --  14.3 14.8   < > 12.5*   HCT  --  44.6  --  41.9  --   --  37.9*   MCV  --  92  --  91  --   --  92   PLT  --  227  --  224  --   --  282     --  141 136 134   < >  --     < > = values in this interval not displayed.          Discharge Information:  Condition at discharge: Stable  Discharge destination:  Admitted to home care  Medications at discharge:  Current Discharge Medication List      START taking these medications     Details   acetaminophen (TYLENOL) 325 MG tablet Take 3 tablets (975 mg) by mouth 3 times daily  Refills: 0    Associated Diagnoses: Nontraumatic complete tear of right rotator cuff      aspirin (ASA) 325 MG EC tablet Take 1 tablet (325 mg) by mouth daily  Qty: 42 tablet, Refills: 0    Associated Diagnoses: Nontraumatic complete tear of right rotator cuff      cyclobenzaprine (FLEXERIL) 10 MG tablet Take 1 tablet (10 mg) by mouth 3 times daily as needed for muscle spasms  Qty: 30 tablet, Refills: 1    Associated Diagnoses: Nontraumatic complete tear of right rotator cuff      ibuprofen (ADVIL/MOTRIN) 600 MG tablet Take 1 tablet (600 mg) by mouth every 6 hours as needed (mild)  Refills: 0    Associated Diagnoses: Nontraumatic complete tear of right rotator cuff      oxyCODONE (ROXICODONE) 5 MG tablet Take 1-2 tablets (5-10 mg) by mouth every 4 hours as needed for moderate to severe pain  Qty: 30 tablet, Refills: 0    Associated Diagnoses: Nontraumatic complete tear of right rotator cuff      !! senna-docusate (SENOKOT-S/PERICOLACE) 8.6-50 MG tablet Take 1-2 tablets by mouth 2 times daily  Qty: 30 tablet, Refills: 0    Associated Diagnoses: Nontraumatic complete tear of right rotator cuff      !! senna-docusate (SENOKOT-S/PERICOLACE) 8.6-50 MG tablet Take 1-2 tablets by mouth 2 times daily Take while on oral narcotics to prevent or treat constipation.  Qty: 30 tablet, Refills: 0    Comments: While taking narcotics  Associated Diagnoses: Nontraumatic complete tear of right rotator cuff       !! - Potential duplicate medications found. Please discuss with provider.      CONTINUE these medications which have NOT CHANGED    Details   ABILIFY 15 MG tablet Take 1 tablet by mouth daily      albuterol (PROAIR HFA) 108 (90 Base) MCG/ACT inhaler 2 puffs at least TID and 2 puffs Q 4 hours prn.  Qty: 1 Inhaler, Refills: 1    Associated Diagnoses: Cough      INVEGA SUSTENNA 234 MG/1.5ML PIETER Inject 234 mg into the muscle every 28  days       metFORMIN (GLUCOPHAGE-XR) 500 MG 24 hr tablet Take 2,000 mg by mouth daily (with breakfast) Takes four tablets in am  Refills: 3      order for DME Equipment being ordered: BIPAP  Patient Ciro Monet received a Analyze Rear BiPap (60 Series) Bilevel. Pressures were set at 21/12 cm H2O.                        Follow-Up Care:  Patient should be seen in the office in 14 days by the Orthopedic Surgeon/Physician Assistant.  Call [phone] for appointment or questions.  MD Meir Donald PA-C

## 2022-02-18 NOTE — PROGRESS NOTES
[facility] DISCHARGE NOTE    Patient discharged to home at 1:50 PM via wheel chair. Accompanied by father and staff. Discharge instructions reviewed with patient, opportunity offered to ask questions. Prescriptions sent with patient to fill . All belongings sent with patient.    STEVAN HAYS RN

## 2022-02-18 NOTE — PROGRESS NOTES
Pt has been pleasant and cooperative. Ambulated to BR x2. Has very good oral intake. God appetite. Has Cpap in his room. Cap. Continues to run with 5l oxygen and sats in lower 90s to upper 80s. Pain has been manageable thus far.

## 2022-02-18 NOTE — CONSULTS
"Care Management Note:    Care Management team received referral from Ortho team to assist pt with Home Care services post surgical services.    Per IDT rounds, EMR review, and/or discussion with PT/OT staff, it has been determined that pt will discharge to his parent's home in [place] and will benefit from Home Care OT services.    Per OT note, \"Ambulates within room and in hallway with SBA. Min A for upper body and lower body dressing- states parents can assist upon discharge.   Per pt does not need to complete stairs as he can stay on main floor and sleep in recliner.      Therapy recommendation(s):    May benefit from home OT to further increase independence with UB/LB dressing within precautions and trial of AE. Per pt; parents can assist upon discharge.\"    SW discussed Medicare.gov and offered choice of agencies.  Pt engaged with [facility] & has [employer] insurance, wishes to remain in network with [facility].    SW sent referral, informed Orth PA to place order.    Pt's father is in the room & will transport home.    ZAC Rodriguez  Care Transitions   Tele: [phone]    HOME CARE HAND OFF  Patient Name: Ciro Monet  MRN: [id number]  : 1970  Patient Zip Code: [address]-- Going to his parent's home at:  [id number] [place] [address]  Admit Diagnosis: Rotator cuff tear [M75.100];Nontraumatic complete tear of right rotator cuff [M75.121]  Services Pt Needs at Home: RN and OT  Discharge Support: Family/Friend Support  Living Arrangements: With family   or Address Other Than Pt: No  [phone]  Wound Care: No  Anticipate DC Date: 2022               "

## 2022-02-20 ENCOUNTER — MEDICAL CORRESPONDENCE (OUTPATIENT)
Dept: HEALTH INFORMATION MANAGEMENT | Facility: CLINIC | Age: 52
End: 2022-02-20
Payer: COMMERCIAL

## 2022-02-21 ENCOUNTER — TELEPHONE (OUTPATIENT)
Dept: FAMILY MEDICINE | Facility: CLINIC | Age: 52
End: 2022-02-21
Payer: COMMERCIAL

## 2022-02-21 NOTE — OP NOTE
Procedure Date: 02/17/2022    PREOPERATIVE DIAGNOSES:  Right shoulder rotator cuff tear, impingement, and biceps tendinosis.    POSTOPERATIVE DIAGNOSES:  Right shoulder rotator cuff tear, impingement, and biceps tendinosis.    PROCEDURES PERFORMED:  Mini open massive rotator cuff repair with mini open biceps tenodesis and arthroscopic subacromial decompression.    SURGEON:  Stevan Nieves MD    ASSISTANT:  Maged Palacio PA-C    COMPLICATIONS:  None.    DESCRIPTION OF PROCEDURE:  After informed risks, benefits, alternatives, and expected outcomes of the procedure, the patient desired to proceed.  He was brought to the operating suite where he was placed under general anesthesia, positioned in low beach chair position.  Timeout verification was completed.  He received 3 grams of Ancef.  Standard arthroscopic portals were established.  Diagnostic arthroscopy revealed grade 0-1 changes of the glenoid humeral head.  There was degenerative fraying of the biceps anchor with significant biceps tendinosis, partial-thickness tearing of the subscapularis tendon, and full-thickness tearing of the entire supraspinatus and significant portion of the infraspinatus tendon were identified.  The scope was placed in the subacromial space.  A subtotal bursectomy was completed.  An anterior acromioplasty with spur and extensive bursal debridement, as well as inspection of the rotator cuff.  Skin demonstrated a large rotator cuff tear involving the entire supraspinatus tendon and greater than 50% of the infraspinatus tendon.  Decision was made to proceed with a mini open biceps tenodesis and rotator cuff repair.  The lateral portal was extended.  The deltoid was split, and an additional bursectomy was completed.  The rotator cuff was tagged with a #1 Ethibond stitch.  Good mobility was demonstrated.  A bleeding cancellous bed was then developed utilizing an Adson rongeur.  Three proximal row tape-loaded SwiveLocks were then positioned  at the osteochondral border.  Tapes were passed together utilizing the FiberLink suture.  Separate horizontal mattress stitches were then passed using #2 FiberWire.  This was repeated x 3, a transosseous equivalent repair.  A double-row repair was then completed using 3 SwiveLock suture anchors and incorporating the suture tapes and horizontal mattress stitches.  Good repair was felt to have been achieved.  A second-look arthroscopic views demonstrated good approximation of the rotator cuff back to the tuberosity.  Biceps tendon was released arthroscopically, fixed within the bicipital groove using 2-8 FiberTak suture anchors.  The proximal tendon was then resected.  Stable repair was felt to have been achieved.  The joint was copiously irrigated.  The deltoid was repaired using multiple #1 buried figure-of-eight Vicryl stitches.  Subcutaneous was closed with 2-0 Vicryl.  Skin was closed with 3-0 Monocryl, Steri-Strips.  Simple nylon sutures were placed in the portal sites, and the patient was placed in an UltraSling and returned to [facility] in stable condition.    Stevan Nieves MD        D: 2022   T: 2022   MT: GARCÍA    Name:     LETY BUSBYPatti  MRN:      [id number]        Account:        [id number]   :      1970           Procedure Date: 2022     Document: [id number]

## 2022-02-21 NOTE — TELEPHONE ENCOUNTER
SUDARSHAN to Dr. Morgan:  This writer gave verbal OK for the followin homecare orders to [facility] at [phone].  1.PHYSICAL THERAPY evaluation   2.  OT evaluation   3.  Skilled nursing 1x for 1 week for right shoulder  post surgical evaluation and teaching  Julio Velásquez RN

## 2022-02-25 ENCOUNTER — TELEPHONE (OUTPATIENT)
Dept: FAMILY MEDICINE | Facility: CLINIC | Age: 52
End: 2022-02-25
Payer: COMMERCIAL

## 2022-02-25 NOTE — TELEPHONE ENCOUNTER
Reason for Call:  Home Health Care    Radha with [facility] called regarding (reason for call): Patient discharged skilled nursing due to skills needs at this time will continue with PT and OT.     Phone Number Homecare Nurse can be reached at: [phone]    Can we leave a detailed message on this number? YES    Phone number patient can be reached at: Home number on file [phone] (home)    Best Time: any    Call taken on 2/25/2022 at 4:50 PM by Arabella Arcos

## 2022-03-02 ENCOUNTER — TRANSFERRED RECORDS (OUTPATIENT)
Dept: HEALTH INFORMATION MANAGEMENT | Facility: CLINIC | Age: 52
End: 2022-03-02
Payer: COMMERCIAL

## 2022-03-04 ENCOUNTER — HOSPITAL ENCOUNTER (EMERGENCY)
Facility: CLINIC | Age: 52
Discharge: HOME | End: 2022-03-04
Attending: EMERGENCY MEDICINE | Admitting: EMERGENCY MEDICINE
Payer: COMMERCIAL

## 2022-03-04 ENCOUNTER — APPOINTMENT (OUTPATIENT)
Dept: CT IMAGING | Facility: CLINIC | Age: 52
End: 2022-03-04
Attending: EMERGENCY MEDICINE
Payer: COMMERCIAL

## 2022-03-04 ENCOUNTER — TELEPHONE (OUTPATIENT)
Dept: FAMILY MEDICINE | Facility: CLINIC | Age: 52
End: 2022-03-04
Payer: COMMERCIAL

## 2022-03-04 VITALS
BODY MASS INDEX: 55.37 KG/M2 | RESPIRATION RATE: 20 BRPM | OXYGEN SATURATION: 99 % | WEIGHT: 315 LBS | DIASTOLIC BLOOD PRESSURE: 86 MMHG | TEMPERATURE: 97.7 F | HEART RATE: 86 BPM | SYSTOLIC BLOOD PRESSURE: 162 MMHG

## 2022-03-04 DIAGNOSIS — R10.9 LEFT FLANK PAIN: ICD-10-CM

## 2022-03-04 DIAGNOSIS — K43.9 VENTRAL HERNIA WITHOUT OBSTRUCTION OR GANGRENE: ICD-10-CM

## 2022-03-04 DIAGNOSIS — N28.1 RENAL CYST, LEFT: ICD-10-CM

## 2022-03-04 LAB
ALBUMIN UR-MCNC: NEGATIVE MG/DL
ANION GAP SERPL CALCULATED.3IONS-SCNC: 3 MMOL/L (ref 3–14)
APPEARANCE UR: CLEAR
BASOPHILS # BLD AUTO: 0 10E3/UL (ref 0–0.2)
BASOPHILS NFR BLD AUTO: 0 %
BILIRUB UR QL STRIP: NEGATIVE
BUN SERPL-MCNC: 14 MG/DL (ref 7–30)
CALCIUM SERPL-MCNC: 9.3 MG/DL (ref 8.5–10.1)
CHLORIDE BLD-SCNC: 103 MMOL/L (ref 94–109)
CO2 SERPL-SCNC: 32 MMOL/L (ref 20–32)
COLOR UR AUTO: YELLOW
CREAT SERPL-MCNC: 0.74 MG/DL (ref 0.66–1.25)
EOSINOPHIL # BLD AUTO: 0.3 10E3/UL (ref 0–0.7)
EOSINOPHIL NFR BLD AUTO: 3 %
ERYTHROCYTE [DISTWIDTH] IN BLOOD BY AUTOMATED COUNT: 12.4 % (ref 10–15)
GFR SERPL CREATININE-BSD FRML MDRD: >90 ML/MIN/1.73M2
GLUCOSE BLD-MCNC: 95 MG/DL (ref 70–99)
GLUCOSE UR STRIP-MCNC: NEGATIVE MG/DL
HCT VFR BLD AUTO: 46 % (ref 40–53)
HGB BLD-MCNC: 15.1 G/DL (ref 13.3–17.7)
HGB UR QL STRIP: NEGATIVE
HOLD SPECIMEN: NORMAL
IMM GRANULOCYTES # BLD: 0.1 10E3/UL
IMM GRANULOCYTES NFR BLD: 1 %
KETONES UR STRIP-MCNC: NEGATIVE MG/DL
LEUKOCYTE ESTERASE UR QL STRIP: NEGATIVE
LYMPHOCYTES # BLD AUTO: 2.1 10E3/UL (ref 0.8–5.3)
LYMPHOCYTES NFR BLD AUTO: 21 %
MCH RBC QN AUTO: 30.6 PG (ref 26.5–33)
MCHC RBC AUTO-ENTMCNC: 32.8 G/DL (ref 31.5–36.5)
MCV RBC AUTO: 93 FL (ref 78–100)
MONOCYTES # BLD AUTO: 1.1 10E3/UL (ref 0–1.3)
MONOCYTES NFR BLD AUTO: 10 %
NEUTROPHILS # BLD AUTO: 6.7 10E3/UL (ref 1.6–8.3)
NEUTROPHILS NFR BLD AUTO: 65 %
NITRATE UR QL: NEGATIVE
NRBC # BLD AUTO: 0 10E3/UL
NRBC BLD AUTO-RTO: 0 /100
PH UR STRIP: 6 [PH] (ref 5–7)
PLATELET # BLD AUTO: 280 10E3/UL (ref 150–450)
POTASSIUM BLD-SCNC: 3.9 MMOL/L (ref 3.4–5.3)
RBC # BLD AUTO: 4.93 10E6/UL (ref 4.4–5.9)
RBC URINE: <1 /HPF
SODIUM SERPL-SCNC: 138 MMOL/L (ref 133–144)
SP GR UR STRIP: 1.01 (ref 1–1.03)
UROBILINOGEN UR STRIP-MCNC: NORMAL MG/DL
WBC # BLD AUTO: 10.3 10E3/UL (ref 4–11)
WBC URINE: 1 /HPF

## 2022-03-04 PROCEDURE — 74176 CT ABD & PELVIS W/O CONTRAST: CPT

## 2022-03-04 PROCEDURE — 81001 URINALYSIS AUTO W/SCOPE: CPT | Performed by: EMERGENCY MEDICINE

## 2022-03-04 PROCEDURE — 82310 ASSAY OF CALCIUM: CPT | Performed by: EMERGENCY MEDICINE

## 2022-03-04 PROCEDURE — 36415 COLL VENOUS BLD VENIPUNCTURE: CPT | Performed by: EMERGENCY MEDICINE

## 2022-03-04 PROCEDURE — 85025 COMPLETE CBC W/AUTO DIFF WBC: CPT | Performed by: EMERGENCY MEDICINE

## 2022-03-04 PROCEDURE — 99284 EMERGENCY DEPT VISIT MOD MDM: CPT | Mod: 25 | Performed by: EMERGENCY MEDICINE

## 2022-03-04 PROCEDURE — 99282 EMERGENCY DEPT VISIT SF MDM: CPT | Performed by: EMERGENCY MEDICINE

## 2022-03-04 NOTE — TELEPHONE ENCOUNTER
Patient states he is having left side back pain in his kidney area. He is having frequency with urine along with urgency. He denies any blood in his urine or chills. He has not taken his temperature so not sure about a fever. Advised patient to be seen in urgent care. He agrees with this plan.    Chen Earl RN

## 2022-03-04 NOTE — ED PROVIDER NOTES
History     Chief Complaint   Patient presents with     Flank Pain     HPI  Ciro Monet is a 51 year old male with a past medical history significant for bipolar disorder obstructive sleep apnea morbid obesity renal cyst presents emergency department complaining of left flank pain.  Patient states symptoms began about 4 days ago and have worsened.  Dull ache in his left flank that does not radiate.  He has not really had any significant problems pain.  He denies any fevers or chills that he is aware of has not had a headache visual changes denies any neck pain denies any chest pain shortness of breath patient is a dull ache he rates a 4 out of 10.  No anterior abdominal pain.  He has not had any nausea vomiting or diarrhea denies any difficulty urinating or frequency.  Denies any focal numbness weakness any extremity is not any calf pain.    Allergies:  Allergies   Allergen Reactions     Adhesive Tape Rash     3M Plastic adhesive transpore tape per dad. Blisters and rash.     Pine Swelling and Difficulty breathing     Eyes swelling & difficultly breathing.       Problem List:    Patient Active Problem List    Diagnosis Date Noted     Nontraumatic complete tear of right rotator cuff 02/17/2022     Priority: Medium     Schizoaffective disorder, chronic condition (H) 12/11/2017     Priority: Medium     Incarcerated hernia 10/29/2016     Priority: Medium     Umbilical hernia, incarcerated 10/29/2016     Priority: Medium     Renal cyst needs ct 6/15 11/04/2014     Priority: Medium     Localized superficial swelling, mass, or lump 06/19/2014     Priority: Medium     Morbid obesity (H) 06/30/2011     Priority: Medium     HYPERLIPIDEMIA LDL GOAL <160 10/31/2010     Priority: Medium     CHARITY (Obstructive Sleep Apnea)-severe () 02/09/2010     Priority: Medium     Esophageal reflux 03/05/2007     Priority: Medium     Moderate depressed bipolar I disorder (H) 05/08/2006     Priority: Medium     Problem list name  updated by automated process. Provider to review          Past Medical History:    Past Medical History:   Diagnosis Date     Hiatal hernia      Morbid obesity with BMI of 45.0-49.9, adult (H)      CHARITY (obstructive sleep apnea) 4/2010       Past Surgical History:    Past Surgical History:   Procedure Laterality Date     ARTHROSCOPY SHOULDER, OPEN ROTATOR CUFF REPAIR, COMBINED Right 2/17/2022    Procedure: Right Shoulder Arthroscopy , Intra articular Debridement, Subacromial Decompression, Biceps Tenodesis, Rotator Cuff Repair;  Surgeon: Stevan Nieves MD;  Location: [facility] OR      REPAIR EPIGASTRIC HERNIA,REDUC  11/23/07     HC TOOTH EXTRACTION W/FORCEP       HEMORRHOID INJECTION SCLEROSING SOLUTION       HERNIORRHAPHY UMBILICAL N/A 10/29/2016    Procedure: HERNIORRHAPHY UMBILICAL;  Surgeon: Lori Barron MD;  Location:  OR     [facility]  ?2005?       Family History:    Family History   Problem Relation Age of Onset     Hypertension Father      Genitourinary Problems Father         kidney stones     C.A.D. Maternal Grandfather      Colon Cancer Paternal Grandfather         ?unsure     Alcoholism Paternal Grandfather      Genitourinary Problems Brother         kidney stones     Genitourinary Problems Brother         kidney stones       Social History:  Marital Status:  Single [1]  Social History     Tobacco Use     Smoking status: Never Smoker     Smokeless tobacco: Never Used   Substance Use Topics     Alcohol use: No     Alcohol/week: 0.0 standard drinks     Drug use: No        Medications:    ABILIFY 15 MG tablet  acetaminophen (TYLENOL) 325 MG tablet  albuterol (PROAIR HFA) 108 (90 Base) MCG/ACT inhaler  aspirin (ASA) 325 MG EC tablet  cyclobenzaprine (FLEXERIL) 10 MG tablet  ibuprofen (ADVIL/MOTRIN) 600 MG tablet  INVEGA SUSTENNA 234 MG/1.5ML PIETER  metFORMIN (GLUCOPHAGE-XR) 500 MG 24 hr tablet  order for DME  oxyCODONE (ROXICODONE) 5 MG tablet  senna-docusate (SENOKOT-S/PERICOLACE) 8.6-50 MG  tablet  senna-docusate (SENOKOT-S/PERICOLACE) 8.6-50 MG tablet          Review of Systems  All systems reviewed and other than pertinent positives negatives in HPI all other systems are negative.  Physical Exam   BP: (!) 164/99  Pulse: 87  Temp: 97.7  F (36.5  C)  Resp: 20  Weight: (!) 190.5 kg (420 lb)  SpO2: 94 %      Physical Exam  Vitals and nursing note reviewed.   Constitutional:       General: He is not in acute distress.     Appearance: Normal appearance. He is obese. He is not ill-appearing, toxic-appearing or diaphoretic.   HENT:      Head: Normocephalic and atraumatic.      Nose: Nose normal.   Eyes:      Conjunctiva/sclera: Conjunctivae normal.   Cardiovascular:      Rate and Rhythm: Normal rate and regular rhythm.      Pulses: Normal pulses.      Heart sounds: Normal heart sounds. No murmur heard.  Pulmonary:      Effort: Pulmonary effort is normal.      Breath sounds: Normal breath sounds. No wheezing or rhonchi.   Chest:      Chest wall: No tenderness.   Abdominal:      General: Abdomen is flat. Bowel sounds are normal. There is no distension.      Palpations: Abdomen is soft.      Tenderness: There is no abdominal tenderness. There is no right CVA tenderness.      Comments: Tenderness to palpation of left flank region.   Musculoskeletal:         General: No swelling or tenderness. Normal range of motion.      Cervical back: Normal range of motion and neck supple.      Right lower leg: No edema.      Left lower leg: No edema.      Comments: There is no midline back tenderness.  Tenderness to palpation left flank region with no erythema or edema present.   Skin:     General: Skin is warm and dry.      Capillary Refill: Capillary refill takes less than 2 seconds.      Findings: No rash.   Neurological:      General: No focal deficit present.      Mental Status: He is alert and oriented to person, place, and time.      Motor: No weakness.      Coordination: Coordination normal.   Psychiatric:          Mood and Affect: Mood normal.         ED Course                 Procedures              Critical Care time:  none               Results for orders placed or performed during the hospital encounter of 03/04/22 (from the past 24 hour(s))   CBC with platelets differential    Narrative    The following orders were created for panel order CBC with platelets differential.  Procedure                               Abnormality         Status                     ---------                               -----------         ------                     CBC with platelets and d...[[id number]]                      Final result                 Please view results for these tests on the individual orders.   CBC with platelets and differential   Result Value Ref Range    WBC Count 10.3 4.0 - 11.0 10e3/uL    RBC Count 4.93 4.40 - 5.90 10e6/uL    Hemoglobin 15.1 13.3 - 17.7 g/dL    Hematocrit 46.0 40.0 - 53.0 %    MCV 93 78 - 100 fL    MCH 30.6 26.5 - 33.0 pg    MCHC 32.8 31.5 - 36.5 g/dL    RDW 12.4 10.0 - 15.0 %    Platelet Count 280 150 - 450 10e3/uL    % Neutrophils 65 %    % Lymphocytes 21 %    % Monocytes 10 %    % Eosinophils 3 %    % Basophils 0 %    % Immature Granulocytes 1 %    NRBCs per 100 WBC 0 <1 /100    Absolute Neutrophils 6.7 1.6 - 8.3 10e3/uL    Absolute Lymphocytes 2.1 0.8 - 5.3 10e3/uL    Absolute Monocytes 1.1 0.0 - 1.3 10e3/uL    Absolute Eosinophils 0.3 0.0 - 0.7 10e3/uL    Absolute Basophils 0.0 0.0 - 0.2 10e3/uL    Absolute Immature Granulocytes 0.1 <=0.4 10e3/uL    Absolute NRBCs 0.0 10e3/uL   UA with Microscopic reflex to Culture    Specimen: Urine, Clean Catch   Result Value Ref Range    Color Urine Yellow Colorless, Straw, Light Yellow, Yellow    Appearance Urine Clear Clear    Glucose Urine Negative Negative mg/dL    Bilirubin Urine Negative Negative    Ketones Urine Negative Negative mg/dL    Specific Gravity Urine 1.015 1.003 - 1.035    Blood Urine Negative Negative    pH Urine 6.0 5.0 - 7.0    Protein  Albumin Urine Negative Negative mg/dL    Urobilinogen Urine Normal Normal, 2.0 mg/dL    Nitrite Urine Negative Negative    Leukocyte Esterase Urine Negative Negative    RBC Urine <1 <=2 /HPF    WBC Urine 1 <=5 /HPF    Narrative    Urine Culture not indicated       Medications - No data to display  Results for orders placed or performed during the hospital encounter of 03/04/22   Abd/pelvis CT - no contrast - Stone Protocol    Narrative    CT ABDOMEN AND PELVIS WITHOUT CONTRAST 3/4/2022 11:27 AM    CLINICAL HISTORY: Abdominal pain. Flank pain, kidney stone suspected.  TECHNIQUE: CT scan of the abdomen and pelvis was performed without IV  contrast. Multiplanar reformats were obtained. Dose reduction  techniques were used.  CONTRAST: None.    COMPARISON: 10/28/2016    FINDINGS:   LOWER CHEST: Stable tiny left lower lobe nodule. This is compatible  with a benign nodule given its long-term stability. No infiltrates or  effusions.    HEPATOBILIARY: Diffuse hepatic steatosis. No significant mass. No bile  duct dilatation. No calcified gallstones.    PANCREAS: No significant mass, duct dilatation, or inflammatory  change.    SPLEEN: Normal size.    ADRENAL GLANDS: No significant nodules.    KIDNEYS/BLADDER: No significant mass, stones, or hydronephrosis. There  are simple or benign cysts. No follow up is needed.    BOWEL: There is a ventral hernia containing nonobstructed sigmoid  colon. Again noted some stranding and fluid, although this was present  previously. The hernia is incompletely visualized.    VASCULATURE: No abdominal aortic aneurysm.    PELVIC ORGANS: No pelvic masses.    OTHER: No free air or free fluid.    MUSCULOSKELETAL: No suspicious bony lesions.      Impression    IMPRESSION:   1.  No acute process demonstrated.  2.  Ventral hernia containing nonobstructed sigmoid colon.    BENI TIRADO MD         SYSTEM ID:  [id number]       Assessments & Plan (with Medical Decision Making) records were reviewed.  Labs  were obtained.  CBC with platelets was unremarkable.  Basic metabolic panel without abnormality.  Urine analysis was unremarkable.  Due to patient's history and presentation risks and benefits of a CT stone protocol were discussed with patient and father and they are in agreement with obtaining this.  This revealed no acute process demonstrated there is a renal cyst on the left which is unchanged and a ventral hernia containing nonobstructed sigmoid colon.  On reexamination patient is not having any lower abdominal pain.  He is advised that he might want to talk with surgeon about the hernia and getting repair.  He should take ibuprofen and Tylenol for pain and return if symptoms worsen or new symptoms develop.  Patient is in agreement this plan.     I have reviewed the nursing notes.    I have reviewed the findings, diagnosis, plan and need for follow up with the patient.       New Prescriptions    No medications on file       Final diagnoses:   Left flank pain   Renal cyst, left   Ventral hernia without obstruction or gangrene       3/4/2022   [facility] EMERGENCY DEPT     Mervin Esqueda MD  03/06/22 [id number]

## 2022-03-04 NOTE — DISCHARGE INSTRUCTIONS
Treating a Thoracic Aortic Aneurysm (TAA)  Endovascular Graft  What is an aneurysm?  An aneurysm is a weak spot in the wall of an artery (blood vessel). The wall expands, or balloons out. If not treated, in time it may burst. This can cause serious bleeding and sometimes death.   What is a thoracic aortic aneurysm (TAA)?  A TAA is an aneurysm in the upper part of the aorta, the main artery that runs down the center of your body. You may have no symptoms, or you may feel:    Pain or tenderness in the chest, belly or back.    A deep, steady ache that may get better as you change positions.    Coughing, hoarseness, feeling short of breath.     Thoracic aortic aneurysm (TAA) repair  If you have a TAA, your doctor may place a stent inside the weak part of the aorta. A stent is a fabric tube with a metal frame.  1. We will give you medicine to help you sleep. Then, the doctor will make a small cut in your groin area. In some cases there will be two cuts, one on either side.  2. The doctor then inserts pencil-sized tubes through the cuts. He or she uses the tubes to carry the stent to the aneurysm.  3. The doctor places the stent so that it lines the inside of the artery wall. This supports the wall and prevents blood from flowing into the aneurysm.    .  For informational purposes only. Not to replace the advice of your health care provider. Illustrations Illustration copyright (c) [employer]ila[employer]  [employer]. Text copyright   2016 [place] [employer] [facility]. All rights reserved. [employer] [id number] - 1/14/16.  Return if symptoms worsen or new symptoms develop.  Follow-up with your primary care later next week for recheck.  No obvious source of your left flank pain was noted.  No kidney stone or's sign of urinary tract infection was found there was no hydronephrosis.  There is a stable left renal cyst and a ventral hernia with bowel in it which is unobstructed.  If you are having abdominal pain you need to get rechecked and  could still consider talking to a surgeon about having this repaired at some time.  If increased abdominal pain worsening flank pain fevers vomiting decreased urine output or other symptoms present please return for further evaluation and care.

## 2022-03-04 NOTE — TELEPHONE ENCOUNTER
Reason for call:  Patient reporting a symptom    Symptom or request: Pt's dad say Ciro had rotator cuff surgery 2/17 and that is going well. He has been having pain in his back on the left for 3-4 days. Dad doesn't think the pain is severe to be a kidney stone but is asking if someone from Dr Morgan's team could call about possibly getting a urine or blood test.     Duration (how long have symptoms been present): 3-4 days    Phone Number OsielBoni can be reached at:   [phone]    Best Time:  anytime    Can we leave a detailed message on this number:  YES    Call taken on 3/4/2022 at 9:37 AM by Sakina Xiao

## 2022-03-06 DIAGNOSIS — K40.90 NON-RECURRENT UNILATERAL INGUINAL HERNIA WITHOUT OBSTRUCTION OR GANGRENE: Primary | ICD-10-CM

## 2022-03-16 ENCOUNTER — OFFICE VISIT (OUTPATIENT)
Dept: FAMILY MEDICINE | Facility: CLINIC | Age: 52
End: 2022-03-16
Payer: COMMERCIAL

## 2022-03-16 VITALS
TEMPERATURE: 98.8 F | RESPIRATION RATE: 22 BRPM | BODY MASS INDEX: 56.68 KG/M2 | WEIGHT: 315 LBS | DIASTOLIC BLOOD PRESSURE: 84 MMHG | HEART RATE: 82 BPM | SYSTOLIC BLOOD PRESSURE: 136 MMHG

## 2022-03-16 DIAGNOSIS — E66.1 CLASS 3 DRUG-INDUCED OBESITY WITH SERIOUS COMORBIDITY AND BODY MASS INDEX (BMI) OF 50.0 TO 59.9 IN ADULT (H): ICD-10-CM

## 2022-03-16 DIAGNOSIS — I89.0 LYMPHEDEMA OF BOTH LOWER EXTREMITIES: ICD-10-CM

## 2022-03-16 DIAGNOSIS — E66.813 CLASS 3 DRUG-INDUCED OBESITY WITH SERIOUS COMORBIDITY AND BODY MASS INDEX (BMI) OF 50.0 TO 59.9 IN ADULT (H): ICD-10-CM

## 2022-03-16 DIAGNOSIS — E66.813 CLASS 3 SEVERE OBESITY DUE TO EXCESS CALORIES WITH SERIOUS COMORBIDITY AND BODY MASS INDEX (BMI) OF 50.0 TO 59.9 IN ADULT (H): ICD-10-CM

## 2022-03-16 DIAGNOSIS — F25.9 SCHIZOAFFECTIVE DISORDER, CHRONIC CONDITION (H): Primary | ICD-10-CM

## 2022-03-16 DIAGNOSIS — E66.01 CLASS 3 SEVERE OBESITY DUE TO EXCESS CALORIES WITH SERIOUS COMORBIDITY AND BODY MASS INDEX (BMI) OF 50.0 TO 59.9 IN ADULT (H): ICD-10-CM

## 2022-03-16 PROCEDURE — 99214 OFFICE O/P EST MOD 30 MIN: CPT | Performed by: FAMILY MEDICINE

## 2022-03-16 RX ORDER — UBIDECARENONE 100 MG
200 CAPSULE ORAL DAILY
COMMUNITY

## 2022-03-16 RX ORDER — CHOLECALCIFEROL (VITAMIN D3) 25 MCG
2000 CAPSULE ORAL
COMMUNITY

## 2022-03-16 RX ORDER — FUROSEMIDE 20 MG
20 TABLET ORAL DAILY
Qty: 30 TABLET | Refills: 0 | Status: SHIPPED | OUTPATIENT
Start: 2022-03-16 | End: 2022-04-08

## 2022-03-16 ASSESSMENT — PAIN SCALES - GENERAL: PAINLEVEL: NO PAIN (0)

## 2022-03-16 NOTE — PROGRESS NOTES
Assessment & Plan       (F25.9) Schizoaffective disorder, chronic condition (H)  (primary encounter diagnosis)  Has severe obesity  Likely needs to switch meds. he is switchin psychiatirists so will discuss lithium por lamictal with them  Likely he needs pca to help him toake his meds regualrly.      (E66.01,  Z68.43) Class 3 severe obesity due to excess calories with serious comorbidity and body mass index (BMI) of 50.0 to 59.9 in adult (H)  Getting worse.  Discussed obesity specialists.     (I89.0) Lymphedema of both lower extremities  Large pannus make this worse when sitting.  Likely has diastolic failure. Rel;arted to pulmonary hypertensiopn related to his obesity related hypovetulation        Keegan Morgan MD  [facility] ALICIA Tanner is a 51 year old who presents for the following health issues  accompanied by his father.    HPI     ED/UC Followup:    Facility:  [facility]  Date of visit: 03/04/22  Reason for visit: Left flank pain; Left renal cyst; Ventral hernia without obstruction or gangrene  Current Status: history is limited an inaccurate do to his psychoeffective disorder. Has been feeling better since the hospital. States that the pains went away after going to the bathroom.      He cracked his back and leg pain is better.      Has lots of leg edema.     Review of Systems   Constitutional, HEENT, cardiovascular, pulmonary, gi and gu systems are negative, except as otherwise noted.      Objective    /84 (BP Location: Left arm, Patient Position: Chair, Cuff Size: Thigh)   Pulse 82   Temp 98.8  F (37.1  C) (Tympanic)   Resp 22   Wt (!) 195 kg (430 lb)   BMI 56.68 kg/m    Body mass index is 56.68 kg/m .  Physical Exam   GENERAL: healthy, alert and no distress  NECK: no adenopathy, no asymmetry, masses, or scars and thyroid normal to palpation  RESP: lungs clear to auscultation - no rales, rhonchi or wheezes  CV: regular rate and rhythm, normal  S1 S2, no S3 or S4, no murmur, click or rub, no peripheral edema and peripheral pulses strong  ABDOMEN: soft, nontender, no hepatosplenomegaly, no masses and bowel sounds normal  MS: no gross musculoskeletal defects noted, no edema

## 2022-03-16 NOTE — NURSING NOTE
"Initial /84 (BP Location: Left arm, Patient Position: Chair, Cuff Size: Thigh)   Pulse 82   Temp 98.8  F (37.1  C) (Tympanic)   Resp 22   Wt (!) 195 kg (430 lb)   BMI 56.68 kg/m   Estimated body mass index is 56.68 kg/m  as calculated from the following:    Height as of 2/17/22: 1.855 m (6' 1.03\").    Weight as of this encounter: 195 kg (430 lb). .    Gloria Hunter CMA ([facility])    "

## 2022-03-30 ENCOUNTER — TRANSFERRED RECORDS (OUTPATIENT)
Dept: HEALTH INFORMATION MANAGEMENT | Facility: CLINIC | Age: 52
End: 2022-03-30
Payer: COMMERCIAL

## 2022-04-06 ENCOUNTER — OFFICE VISIT (OUTPATIENT)
Dept: FAMILY MEDICINE | Facility: CLINIC | Age: 52
End: 2022-04-06
Payer: COMMERCIAL

## 2022-04-06 VITALS
WEIGHT: 315 LBS | HEART RATE: 89 BPM | SYSTOLIC BLOOD PRESSURE: 134 MMHG | RESPIRATION RATE: 16 BRPM | DIASTOLIC BLOOD PRESSURE: 88 MMHG | TEMPERATURE: 98.5 F | HEIGHT: 73 IN | BODY MASS INDEX: 41.75 KG/M2 | OXYGEN SATURATION: 91 %

## 2022-04-06 DIAGNOSIS — Z11.4 SCREENING FOR HIV (HUMAN IMMUNODEFICIENCY VIRUS): ICD-10-CM

## 2022-04-06 DIAGNOSIS — I10 PRIMARY HYPERTENSION: Primary | ICD-10-CM

## 2022-04-06 DIAGNOSIS — Z11.59 NEED FOR HEPATITIS C SCREENING TEST: ICD-10-CM

## 2022-04-06 LAB
ANION GAP SERPL CALCULATED.3IONS-SCNC: 5 MMOL/L (ref 3–14)
BUN SERPL-MCNC: 15 MG/DL (ref 7–30)
CALCIUM SERPL-MCNC: 9.2 MG/DL (ref 8.5–10.1)
CHLORIDE BLD-SCNC: 104 MMOL/L (ref 94–109)
CO2 SERPL-SCNC: 31 MMOL/L (ref 20–32)
CREAT SERPL-MCNC: 0.9 MG/DL (ref 0.66–1.25)
GFR SERPL CREATININE-BSD FRML MDRD: >90 ML/MIN/1.73M2
GLUCOSE BLD-MCNC: 95 MG/DL (ref 70–99)
POTASSIUM BLD-SCNC: 4.1 MMOL/L (ref 3.4–5.3)
SODIUM SERPL-SCNC: 140 MMOL/L (ref 133–144)

## 2022-04-06 PROCEDURE — 99213 OFFICE O/P EST LOW 20 MIN: CPT | Performed by: FAMILY MEDICINE

## 2022-04-06 PROCEDURE — 80048 BASIC METABOLIC PNL TOTAL CA: CPT | Performed by: FAMILY MEDICINE

## 2022-04-06 PROCEDURE — 36415 COLL VENOUS BLD VENIPUNCTURE: CPT | Performed by: FAMILY MEDICINE

## 2022-04-06 NOTE — PROGRESS NOTES
"  Assessment & Plan         Primary hypertension  Good control.  Continue currant medications, continue to monitor.    - Basic metabolic panel  (Ca, Cl, CO2, Creat, Gluc, K, Na, BUN); Future  - Basic metabolic panel  (Ca, Cl, CO2, Creat, Gluc, K, Na, BUN)       BMI:   Estimated body mass index is 55.47 kg/m  as calculated from the following:    Height as of this encounter: 1.854 m (6' 1\").    Weight as of this encounter: 190.7 kg (420 lb 6.4 oz).   Weight management plan: Discussed healthy diet and exercise guidelines      No follow-ups on file.    Keegan Morgan MD  [facility] ALICIA Tanner is a 51 year old who presents for the following health issues     History of Present Illness       Reason for visit:  Follow up to new medication  Symptom onset:  More than a month  Symptoms include:  Excessive fluid retention    He eats 4 or more servings of fruits and vegetables daily.He exercises with enough effort to increase his heart rate 9 or less minutes per day.  He exercises with enough effort to increase his heart rate 3 or less days per week.   He is taking medications regularly.       Medication Followup of furosemide     Taking Medication as prescribed: yes    Side Effects:  None    Medication Helping Symptoms:  Yes, edema is down and has lost 10 lb       Review of Systems   Constitutional, HEENT, cardiovascular, pulmonary, gi and gu systems are negative, except as otherwise noted.      Objective    /88   Pulse 89   Temp 98.5  F (36.9  C) (Tympanic)   Resp 16   Ht 1.854 m (6' 1\")   Wt (!) 190.7 kg (420 lb 6.4 oz)   SpO2 91%   BMI 55.47 kg/m    Body mass index is 55.47 kg/m .  Physical Exam   GENERAL: healthy, alert and no distress  NECK: no adenopathy, no asymmetry, masses, or scars and thyroid normal to palpation  RESP: lungs clear to auscultation - no rales, rhonchi or wheezes  CV: regular rate and rhythm, normal S1 S2, no S3 or S4, no murmur, click or rub, no peripheral " edema and peripheral pulses strong  ABDOMEN: soft, nontender, no hepatosplenomegaly, no masses and bowel sounds normal  MS: no gross musculoskeletal defects noted, no edema

## 2022-04-07 DIAGNOSIS — I89.0 LYMPHEDEMA OF BOTH LOWER EXTREMITIES: ICD-10-CM

## 2022-04-07 NOTE — TELEPHONE ENCOUNTER
Routing refill request to provider for review/approval because:  PCP to determine refill - visit note not complete from 4/6/2022    Black Carroll RN

## 2022-04-08 RX ORDER — FUROSEMIDE 20 MG
TABLET ORAL
Qty: 30 TABLET | Refills: 0 | Status: SHIPPED | OUTPATIENT
Start: 2022-04-08 | End: 2022-05-05

## 2022-05-05 DIAGNOSIS — I89.0 LYMPHEDEMA OF BOTH LOWER EXTREMITIES: ICD-10-CM

## 2022-05-05 RX ORDER — FUROSEMIDE 20 MG
TABLET ORAL
Qty: 30 TABLET | Refills: 0 | Status: SHIPPED | OUTPATIENT
Start: 2022-05-05 | End: 2022-06-08

## 2022-05-05 NOTE — TELEPHONE ENCOUNTER
Routing refill request to provider for review/approval because:  PCP to determine refill    Black Carroll RN

## 2022-05-11 ENCOUNTER — TRANSFERRED RECORDS (OUTPATIENT)
Dept: HEALTH INFORMATION MANAGEMENT | Facility: CLINIC | Age: 52
End: 2022-05-11
Payer: COMMERCIAL

## 2022-05-25 NOTE — BRIEF OP NOTE
[facility]    Brief Operative Note    Pre-operative diagnosis: Rotator cuff tear [M75.100]  Post-operative diagnosis Same as pre-operative diagnosis with impingemeent and Biceps tendonosis/subluxation    Procedure: Procedure(s):  Right Shoulder Arthroscopy , Intraarticular Debridement, Subachromial Decompression, Biceps Tenodesis, Rotator Cuff Repair  Surgeon: Surgeon(s) and Role:     * Stevan Nieves MD - Primary     * Maegd Palacio PA-C - Assisting  Anesthesia: General   Estimated Blood Loss: Less than 100 ml    Drains: None  Specimens: * No specimens in log *  Findings:   None.  Complications: None.  Implants:   Implant Name Type Inv. Item Serial No.  Lot No. LRB No. Used Action   IMP ANCHOR ARTHREX BIO-SWIVELOCK 4.75X19.1MM AR-[id number] - [id number] Metallic Hardware/[place] IMP ANCHOR ARTHREX BIO-SWIVELOCK 4.75X19.1MM AR-2324BCC  ARTHREX [id number] Right 1 Implanted   IMP ANCHOR ARTHREX BIO-SWIVELOCK 4.75X19.1MM AR-2324BCC - [id number] Metallic Hardware/[place] IMP ANCHOR ARTHREX BIO-SWIVELOCK 4.75X19.1MM AR-2324BCC  ARTHREX [id number] Right 1 Implanted   IMP SIU ANCHOR ARTHREX REPAIR 1.3MM FIBERTAK AR-3671 - [id number] Metallic Hardware/[place] IMP SIU ANCHOR ARTHREX REPAIR 1.3MM FIBERTAK AR-3671  ARTHREX [id number] Right 1 Implanted   FIBERTAK BICEPS IMP SET - [id number] Metallic Hardware/[place] FIBERTAK BICEPS IMP SET  ARTHREX [id number] Right 1 Implanted   IMP ANCHOR ARTHREX BIO-SWIVELOCK 4.75X19.1MM AR-2324BCC - [id number] Metallic Hardware/[place] IMP ANCHOR ARTHREX BIO-SWIVELOCK 4.75X19.1MM AR-2324BCC  ARTHREX [id number] Right 1 Implanted   IMP ANCHOR ARTHREX BIO-SWIVELOCK 4.75X19.1MM AR-2324BCC - [id number] Metallic Hardware/[place] IMP ANCHOR ARTHREX BIO-SWIVELOCK 4.75X19.1MM AR-2324BCC  ARTHREX [id number] Right 1 Implanted   IMP ANCHOR ARTHREX BIO-SWIVELOCK 4.75X19.1MM AR-2324BCC - [id number] Metallic Hardware/[place] IMP ANCHOR ARTHREX BIO-SWIVELOCK 4.75X19.1MM  AR-2324BCC  ARTHREX [id number] Right 1 Implanted   IMP ANCHOR ARTHREX BIO-SWIVELOCK 4.75X19.1MM AR-[id number] - [id number] Metallic Hardware/[place] IMP ANCHOR ARTHREX BIO-SWIVELOCK 4.75X19.1MM AR-2324BCC  [facility] [id number] Right 1 Implanted            Rhombic Flap Text: The defect edges were debeveled with a #15 scalpel blade.  Given the location of the defect and the proximity to free margins a rhombic flap was deemed most appropriate.  Using a sterile surgical marker, an appropriate rhombic flap was drawn incorporating the defect.    The area thus outlined was incised deep to adipose tissue with a #15 scalpel blade.  The skin margins were undermined to an appropriate distance in all directions utilizing iris scissors.

## 2022-05-27 ENCOUNTER — TRANSFERRED RECORDS (OUTPATIENT)
Dept: HEALTH INFORMATION MANAGEMENT | Facility: CLINIC | Age: 52
End: 2022-05-27
Payer: COMMERCIAL

## 2022-06-06 DIAGNOSIS — I89.0 LYMPHEDEMA OF BOTH LOWER EXTREMITIES: ICD-10-CM

## 2022-06-08 RX ORDER — FUROSEMIDE 20 MG
TABLET ORAL
Qty: 90 TABLET | Refills: 1 | Status: SHIPPED | OUTPATIENT
Start: 2022-06-08 | End: 2022-12-19

## 2022-07-13 ENCOUNTER — OFFICE VISIT (OUTPATIENT)
Dept: FAMILY MEDICINE | Facility: CLINIC | Age: 52
End: 2022-07-13
Payer: COMMERCIAL

## 2022-07-13 VITALS
DIASTOLIC BLOOD PRESSURE: 88 MMHG | HEART RATE: 90 BPM | SYSTOLIC BLOOD PRESSURE: 130 MMHG | HEIGHT: 73 IN | OXYGEN SATURATION: 93 % | WEIGHT: 315 LBS | BODY MASS INDEX: 41.75 KG/M2 | TEMPERATURE: 98.2 F

## 2022-07-13 DIAGNOSIS — F31.32 MODERATE DEPRESSED BIPOLAR I DISORDER (H): ICD-10-CM

## 2022-07-13 DIAGNOSIS — G47.33 OBSTRUCTIVE SLEEP APNEA SYNDROME: ICD-10-CM

## 2022-07-13 DIAGNOSIS — F25.9 SCHIZOAFFECTIVE DISORDER, CHRONIC CONDITION (H): ICD-10-CM

## 2022-07-13 DIAGNOSIS — E66.813 CLASS 3 SEVERE OBESITY DUE TO EXCESS CALORIES WITH SERIOUS COMORBIDITY AND BODY MASS INDEX (BMI) OF 50.0 TO 59.9 IN ADULT (H): ICD-10-CM

## 2022-07-13 DIAGNOSIS — R42 DIZZINESS: ICD-10-CM

## 2022-07-13 DIAGNOSIS — Z11.4 SCREENING FOR HIV (HUMAN IMMUNODEFICIENCY VIRUS): ICD-10-CM

## 2022-07-13 DIAGNOSIS — I89.0 LYMPHEDEMA OF BOTH LOWER EXTREMITIES: Primary | ICD-10-CM

## 2022-07-13 DIAGNOSIS — Z11.59 NEED FOR HEPATITIS C SCREENING TEST: ICD-10-CM

## 2022-07-13 DIAGNOSIS — I10 PRIMARY HYPERTENSION: ICD-10-CM

## 2022-07-13 DIAGNOSIS — E66.01 CLASS 3 SEVERE OBESITY DUE TO EXCESS CALORIES WITH SERIOUS COMORBIDITY AND BODY MASS INDEX (BMI) OF 50.0 TO 59.9 IN ADULT (H): ICD-10-CM

## 2022-07-13 PROCEDURE — 99214 OFFICE O/P EST MOD 30 MIN: CPT | Performed by: FAMILY MEDICINE

## 2022-07-13 ASSESSMENT — PAIN SCALES - GENERAL: PAINLEVEL: NO PAIN (0)

## 2022-07-13 NOTE — PROGRESS NOTES
SUBJECTIVE:                                                    Ciro Monet is a 51 year old male who presents to clinic today for the following health issues:    Chief Complaint   Patient presents with     Follow Up     Form last visit. He was last seen for blood pressure.      -He is here to follow up from his last visit regarding his blood pressure.    -He is also here to follow up from a recent fall he had at work. He stood up real fast and got dizzy and fell. He says the new medication is helping with his dizziness.     Answers for HPI/ROS submitted by the patient on 7/6/2022  What is the reason for your visit today? : Follow up from previous appointment.  How many servings of fruits and vegetables do you eat daily?: 4 or more  On average, how many sweetened beverages do you drink each day (Examples: soda, juice, sweet tea, etc.  Do NOT count diet or artificially sweetened beverages)?: 0  How many minutes a day do you exercise enough to make your heart beat faster?: 20 to 29  How many days a week do you exercise enough to make your heart beat faster?: 5  How many days per week do you miss taking your medication?: 0      Problem list and histories reviewed & adjusted, as indicated.  Additional history:     Patient Active Problem List   Diagnosis     Moderate depressed bipolar I disorder (H)     Esophageal reflux     CHARITY (Obstructive Sleep Apnea)-severe ()     HYPERLIPIDEMIA LDL GOAL <160     Morbid obesity (H)     Localized superficial swelling, mass, or lump     Renal cyst needs ct 6/15     Incarcerated hernia     Umbilical hernia, incarcerated     Schizoaffective disorder, chronic condition (H)     Nontraumatic complete tear of right rotator cuff     Past Surgical History:   Procedure Laterality Date     ARTHROSCOPY SHOULDER, OPEN ROTATOR CUFF REPAIR, COMBINED Right 2/17/2022    Procedure: Right Shoulder Arthroscopy , Intra articular Debridement, Subacromial Decompression, Biceps Tenodesis, Rotator  Cuff Repair;  Surgeon: Stevan Nieves MD;  Location: [facility] OR      REPAIR EPIGASTRIC HERNIA,REDUC  11/23/07     HC TOOTH EXTRACTION W/FORCEP       HEMORRHOID INJECTION SCLEROSING SOLUTION       HERNIORRHAPHY UMBILICAL N/A 10/29/2016    Procedure: HERNIORRHAPHY UMBILICAL;  Surgeon: Lori Barron MD;  Location:  OR     [facility]  ?2005?       Social History     Tobacco Use     Smoking status: Never Smoker     Smokeless tobacco: Never Used   Substance Use Topics     Alcohol use: No     Alcohol/week: 0.0 standard drinks     Family History   Problem Relation Age of Onset     Hypertension Father      Genitourinary Problems Father         kidney stones     C.A.D. Maternal Grandfather      Colon Cancer Paternal Grandfather         ?unsure     Alcoholism Paternal Grandfather      Genitourinary Problems Brother         kidney stones     Genitourinary Problems Brother         kidney stones         Current Outpatient Medications   Medication Sig Dispense Refill     ABILIFY 15 MG tablet Take 1 tablet by mouth daily       Cholecalciferol (VITAMIN D-3) 25 MCG (1000 UT) CAPS Take 2,000 Units by mouth       co-enzyme Q-10 100 MG CAPS capsule Take 200 mg by mouth daily       furosemide (LASIX) 20 MG tablet TAKE 1 TABLET BY MOUTH DAILY 90 tablet 1     INVEGA SUSTENNA 234 MG/1.5ML PIETER Inject 234 mg into the muscle every 28 days        metFORMIN (GLUCOPHAGE-XR) 500 MG 24 hr tablet Take 2,000 mg by mouth daily (with breakfast) Takes four tablets in am  3     order for DME Equipment being ordered: BIPAP  Patient Ciro Monet received a [employer]uel[employer] REMstar BiPap (60 Series) Bilevel. Pressures were set at 21/12 cm H2O.       acetaminophen (TYLENOL) 325 MG tablet Take 3 tablets (975 mg) by mouth 3 times daily (Patient not taking: Reported on 7/13/2022)  0     oxyCODONE (ROXICODONE) 5 MG tablet Take 1-2 tablets (5-10 mg) by mouth every 4 hours as needed for moderate to severe pain (Patient not taking: Reported on 7/13/2022)  "30 tablet 0     senna-docusate (SENOKOT-S/PERICOLACE) 8.6-50 MG tablet Take 1-2 tablets by mouth 2 times daily Take while on oral narcotics to prevent or treat constipation. (Patient not taking: No sig reported) 30 tablet 0     Turmeric Curcumin 500 MG CAPS  (Patient not taking: Reported on 7/13/2022)       Allergies   Allergen Reactions     Adhesive Tape Rash     3M Plastic adhesive transpore tape per dad. Blisters and rash.     Pine Swelling and Difficulty breathing     Eyes swelling & difficultly breathing.       ROS:  Constitutional, HEENT, cardiovascular, pulmonary, gi and gu systems are negative, except as otherwise noted.    OBJECTIVE:                                                    /88   Pulse 90   Temp 98.2  F (36.8  C) (Tympanic)   Ht 1.854 m (6' 1\")   Wt (!) 193.1 kg (425 lb 12.8 oz)   SpO2 93%   BMI 56.18 kg/m   Body mass index is 56.18 kg/m .   GENERAL: healthy, alert, well nourished, well hydrated, no distress  HENT: ear canals- normal; TMs- normal; Nose- normal; Mouth- no ulcers, no lesions  NECK: no tenderness, no adenopathy, no asymmetry, no masses, no stiffness; thyroid- normal to palpation  RESP: lungs clear to auscultation - no rales, no rhonchi, no wheezes  CV: regular rates and rhythm, normal S1 S2, no S3 or S4 and no murmur, no click or rub -  ABDOMEN: soft, no tenderness, no  hepatosplenomegaly, no masses, normal bowel sounds  extm 2+ pitting edema     ASSESSMENT/PLAN:                                                      (I89.0) Lymphedema of both lower extremities  (primary encounter diagnosis)        (I10) Primary hypertension  Good control.  Continue currant medications, continue to monitor.      (F25.9) Schizoaffective disorder, chronic condition (H)  Good control.  Continue currant medications, continue to monitor.      (G47.33) Obstructive sleep apnea syndrome -  Uses bipap regualrly and it is effective      (E66.01,  Z68.43) Class 3 severe obesity due to excess calories " with serious comorbidity and body mass index (BMI) of 50.0 to 59.9 in adult (H)  Poor conrol   Wt Readings from Last 10 Encounters:   07/13/22 (!) 193.1 kg (425 lb 12.8 oz)   04/06/22 (!) 190.7 kg (420 lb 6.4 oz)   03/16/22 (!) 195 kg (430 lb)   03/04/22 (!) 190.5 kg (420 lb)   02/17/22 (!) 197.2 kg (434 lb 12 oz)   01/26/22 (!) 193.9 kg (427 lb 8 oz)   09/29/21 (!) 189.1 kg (417 lb)   05/05/21 (!) 186.9 kg (412 lb)   10/23/20 (!) 186 kg (410 lb)   09/28/20 (!) 186.3 kg (410 lb 11.2 oz)         (F31.32) Moderate depressed bipolar I disorder (H)  Stable  Sees psych has injection  Monthly     (R42) Dizziness  Improved.    Good control.  Continue currant medications, continue to monitor.          (Z11.4) Screening for HIV (human immunodeficiency virus)  Plan: HIV Antigen Antibody Combo      (Z11.59) Need for hepatitis C screening test  Plan: Hepatitis C Screen Reflex to HCV RNA Quant and         Genotype        reports that he has never smoked. He has never used smokeless tobacco.      Weight management plan: Discussed healthy diet and exercise guidelines      [facility]

## 2022-09-08 ENCOUNTER — TRANSFERRED RECORDS (OUTPATIENT)
Dept: HEALTH INFORMATION MANAGEMENT | Facility: CLINIC | Age: 52
End: 2022-09-08

## 2022-10-23 ENCOUNTER — HEALTH MAINTENANCE LETTER (OUTPATIENT)
Age: 52
End: 2022-10-23

## 2022-12-10 ENCOUNTER — HEALTH MAINTENANCE LETTER (OUTPATIENT)
Age: 52
End: 2022-12-10

## 2022-12-17 DIAGNOSIS — I89.0 LYMPHEDEMA OF BOTH LOWER EXTREMITIES: ICD-10-CM

## 2022-12-19 RX ORDER — FUROSEMIDE 20 MG
TABLET ORAL
Qty: 90 TABLET | Refills: 1 | Status: SHIPPED | OUTPATIENT
Start: 2022-12-19 | End: 2023-06-28

## 2022-12-22 ENCOUNTER — APPOINTMENT (OUTPATIENT)
Dept: LAB | Facility: CLINIC | Age: 52
End: 2022-12-22
Payer: COMMERCIAL

## 2023-01-23 ENCOUNTER — LAB (OUTPATIENT)
Dept: LAB | Facility: CLINIC | Age: 53
End: 2023-01-23
Payer: COMMERCIAL

## 2023-01-23 DIAGNOSIS — F25.1 SCHIZOAFFECTIVE DISORDER, DEPRESSIVE TYPE (H): ICD-10-CM

## 2023-01-23 DIAGNOSIS — Z79.899 HIGH RISK MEDICATIONS (NOT ANTICOAGULANTS) LONG-TERM USE: ICD-10-CM

## 2023-01-23 LAB
ALBUMIN SERPL BCG-MCNC: 4.2 G/DL (ref 3.5–5.2)
ALP SERPL-CCNC: 96 U/L (ref 40–129)
ALT SERPL W P-5'-P-CCNC: 20 U/L (ref 10–50)
ANION GAP SERPL CALCULATED.3IONS-SCNC: 10 MMOL/L (ref 7–15)
AST SERPL W P-5'-P-CCNC: 26 U/L (ref 10–50)
BASOPHILS # BLD AUTO: 0 10E3/UL (ref 0–0.2)
BASOPHILS NFR BLD AUTO: 0 %
BILIRUB SERPL-MCNC: 0.3 MG/DL
BUN SERPL-MCNC: 12.8 MG/DL (ref 6–20)
CALCIUM SERPL-MCNC: 9.3 MG/DL (ref 8.6–10)
CHLORIDE SERPL-SCNC: 104 MMOL/L (ref 98–107)
CHOLEST SERPL-MCNC: 141 MG/DL
CREAT SERPL-MCNC: 0.73 MG/DL (ref 0.67–1.17)
DEPRECATED HCO3 PLAS-SCNC: 27 MMOL/L (ref 22–29)
EOSINOPHIL # BLD AUTO: 0.3 10E3/UL (ref 0–0.7)
EOSINOPHIL NFR BLD AUTO: 3 %
ERYTHROCYTE [DISTWIDTH] IN BLOOD BY AUTOMATED COUNT: 13.2 % (ref 10–15)
GFR SERPL CREATININE-BSD FRML MDRD: >90 ML/MIN/1.73M2
GLUCOSE SERPL-MCNC: 80 MG/DL (ref 70–99)
HCT VFR BLD AUTO: 43.9 % (ref 40–53)
HDLC SERPL-MCNC: 53 MG/DL
HGB BLD-MCNC: 14.1 G/DL (ref 13.3–17.7)
LDLC SERPL CALC-MCNC: 78 MG/DL
LYMPHOCYTES # BLD AUTO: 2.2 10E3/UL (ref 0.8–5.3)
LYMPHOCYTES NFR BLD AUTO: 23 %
MCH RBC QN AUTO: 29.7 PG (ref 26.5–33)
MCHC RBC AUTO-ENTMCNC: 32.1 G/DL (ref 31.5–36.5)
MCV RBC AUTO: 93 FL (ref 78–100)
MONOCYTES # BLD AUTO: 0.9 10E3/UL (ref 0–1.3)
MONOCYTES NFR BLD AUTO: 10 %
NEUTROPHILS # BLD AUTO: 6.1 10E3/UL (ref 1.6–8.3)
NEUTROPHILS NFR BLD AUTO: 64 %
NONHDLC SERPL-MCNC: 88 MG/DL
PLATELET # BLD AUTO: 252 10E3/UL (ref 150–450)
POTASSIUM SERPL-SCNC: 3.9 MMOL/L (ref 3.4–5.3)
PROT SERPL-MCNC: 7.1 G/DL (ref 6.4–8.3)
RBC # BLD AUTO: 4.74 10E6/UL (ref 4.4–5.9)
SODIUM SERPL-SCNC: 141 MMOL/L (ref 136–145)
TRIGL SERPL-MCNC: 51 MG/DL
TSH SERPL DL<=0.005 MIU/L-ACNC: 2.52 UIU/ML (ref 0.3–4.2)
WBC # BLD AUTO: 9.5 10E3/UL (ref 4–11)

## 2023-01-23 PROCEDURE — 80061 LIPID PANEL: CPT

## 2023-01-23 PROCEDURE — 36415 COLL VENOUS BLD VENIPUNCTURE: CPT

## 2023-01-23 PROCEDURE — 83036 HEMOGLOBIN GLYCOSYLATED A1C: CPT

## 2023-01-23 PROCEDURE — 80050 GENERAL HEALTH PANEL: CPT

## 2023-01-25 LAB — HBA1C MFR BLD: 6 % (ref 0–5.6)

## 2023-02-06 ENCOUNTER — TELEPHONE (OUTPATIENT)
Dept: FAMILY MEDICINE | Facility: CLINIC | Age: 53
End: 2023-02-06
Payer: COMMERCIAL

## 2023-02-06 ENCOUNTER — TELEPHONE (OUTPATIENT)
Dept: FAMILY MEDICINE | Facility: CLINIC | Age: 53
End: 2023-02-06

## 2023-02-06 DIAGNOSIS — U07.1 INFECTION DUE TO 2019 NOVEL CORONAVIRUS: Primary | ICD-10-CM

## 2023-02-06 PROCEDURE — 99213 OFFICE O/P EST LOW 20 MIN: CPT | Mod: CS | Performed by: FAMILY MEDICINE

## 2023-02-06 NOTE — TELEPHONE ENCOUNTER
RN COVID TREATMENT VISIT  02/06/23    Ciro Monet  52 year old  Current weight? 422 lbs    Has the patient been seen by a primary care provider at an [facility] or [facility] within the past two years? Yes.   Have you been in close proximity to/do you have a known exposure to a person with a confirmed case of influenza? No.     Date of positive COVID test (PCR or at home)?  2/6/23    Current COVID symptoms: cough, fatigue and sore throat    Date COVID symptoms began: 2/3/23    Do you have any of the following conditions that place you at risk of being very sick from COVID-19? disabilities and overweight (BMI>25)    Is patient eligible to continue? Yes, established patient, 12 years or older weighing at least 88.2 lbs, who has COVID symptoms that started in the past 5 days and is at risk for being very sick from COVID-19.       Have you received monoclonal antibodies or oral antiviral medications since testing positive to COVID-19? No    Are you currently hospitalized for COVID-19? No    Do you have a history of hepatitis? No    Are you currently pregnant or nursing? No    Do you have a clinically significant hypersensitivity to nirmatrelvir, ritonavir, or molnupiravir? No    Do you have any history of severe renal impairment (eGFR < 30mL/min)? No    Do you have any history of hepatic impairment or abnormalities (e.g. hepatic panel, ALT, AST, ALK Phos, bilirubin)? No    Have you had a coronary stent placed in the previous 6 months? No    Is patient eligible to continue?   Yes, patient meets all eligibility requirements for the RN COVID treatment (as denoted by all no responses above).     Current Outpatient Medications   Medication Sig Dispense Refill     ABILIFY 15 MG tablet Take 1 tablet by mouth daily       acetaminophen (TYLENOL) 325 MG tablet Take 3 tablets (975 mg) by mouth 3 times daily (Patient not taking: Reported on 7/13/2022)  0     Cholecalciferol (VITAMIN D-3) 25 MCG (1000  UT) CAPS Take 2,000 Units by mouth       co-enzyme Q-10 100 MG CAPS capsule Take 200 mg by mouth daily       furosemide (LASIX) 20 MG tablet TAKE 1 TABLET BY MOUTH DAILY 90 tablet 1     INVEGA SUSTENNA 234 MG/1.5ML PIETER Inject 234 mg into the muscle every 28 days        metFORMIN (GLUCOPHAGE-XR) 500 MG 24 hr tablet Take 2,000 mg by mouth daily (with breakfast) Takes four tablets in am  3     order for DME Equipment being ordered: BIPAP  Patient Ciro Monet received a One[employer]star BiPap (60 Series) Bilevel. Pressures were set at 21/12 cm H2O.       senna-docusate (SENOKOT-S/PERICOLACE) 8.6-50 MG tablet Take 1-2 tablets by mouth 2 times daily Take while on oral narcotics to prevent or treat constipation. (Patient not taking: No sig reported) 30 tablet 0     Turmeric Curcumin 500 MG CAPS  (Patient not taking: Reported on 7/13/2022)         Medications from List 1 of the standing order (on medications that exclude the use of Paxlovid) that patient is taking: NONE. Is patient taking Ellsworth's Wort? No  Is patient taking Rojelio's Wort or any meds from List 1? No.   Medications from List 2 of the standing order (on meds that provider needs to adjust) that patient is taking: aripiprazole(Ab ilify, Aristada), explained a provider visit is necessary to discuss medication adjustments while taking Paxlovid.  Patient informed that a virtual visit is needed, patient transferred to scheduling desk to arrange.    Julie Behrendt RN

## 2023-02-07 ENCOUNTER — VIRTUAL VISIT (OUTPATIENT)
Dept: URGENT CARE | Facility: CLINIC | Age: 53
End: 2023-02-07
Payer: COMMERCIAL

## 2023-02-07 DIAGNOSIS — U07.1 COVID: Primary | ICD-10-CM

## 2023-02-07 PROCEDURE — 99207 PR NO BILLABLE SERVICE THIS VISIT: CPT | Performed by: EMERGENCY MEDICINE

## 2023-03-14 ENCOUNTER — DOCUMENTATION ONLY (OUTPATIENT)
Dept: FAMILY MEDICINE | Facility: CLINIC | Age: 53
End: 2023-03-14
Payer: COMMERCIAL

## 2023-03-14 DIAGNOSIS — G47.33 OSA (OBSTRUCTIVE SLEEP APNEA): Primary | ICD-10-CM

## 2023-05-09 ENCOUNTER — ALLIED HEALTH/NURSE VISIT (OUTPATIENT)
Dept: FAMILY MEDICINE | Facility: CLINIC | Age: 53
End: 2023-05-09
Payer: MEDICAID

## 2023-05-09 DIAGNOSIS — Z23 NEED FOR HEPATITIS VACCINATION: Primary | ICD-10-CM

## 2023-05-09 PROCEDURE — 90471 IMMUNIZATION ADMIN: CPT

## 2023-05-09 PROCEDURE — 99207 PR NO CHARGE NURSE ONLY: CPT

## 2023-05-09 PROCEDURE — 90746 HEPB VACCINE 3 DOSE ADULT IM: CPT

## 2023-05-09 NOTE — PROGRESS NOTES
Prior to immunization administration, verified patients identity using patient s name and date of birth. Please see Immunization Activity for additional information.     Screening Questionnaire for Adult Immunization    Are you sick today?   No   Do you have allergies to medications, food, a vaccine component or latex?   No   Have you ever had a serious reaction after receiving a vaccination?   No   Do you have a long-term health problem with heart, lung, kidney, or metabolic disease (e.g., diabetes), asthma, a blood disorder, no spleen, complement component deficiency, a cochlear implant, or a spinal fluid leak?  Are you on long-term aspirin therapy?   No   Do you have cancer, leukemia, HIV/AIDS, or any other immune system problem?   No   Do you have a parent, brother, or sister with an immune system problem?   No   In the past 3 months, have you taken medications that affect  your immune system, such as prednisone, other steroids, or anticancer drugs; drugs for the treatment of rheumatoid arthritis, Crohn s disease, or psoriasis; or have you had radiation treatments?   No   Have you had a seizure, or a brain or other nervous system problem?   No   During the past year, have you received a transfusion of blood or blood    products, or been given immune (gamma) globulin or antiviral drug?   No   For women: Are you pregnant or is there a chance you could become       pregnant during the next month?   No   Have you received any vaccinations in the past 4 weeks?   No     Immunization questionnaire answers were all negative.    I have reviewed the following standing orders:   This patient is due and qualifies for the Hepatitis B vaccine.    Click here for Hepatitis B Standing Order    I have reviewed the vaccines inclusion and exclusion criteria; No concerns regarding eligibility.         Injection of Hep B given by Aaliyah Joshua. Patient instructed to remain in clinic for 15 minutes afterwards, and to report any adverse  reactions.     Screening performed by Aaliyah Joshua on 5/9/2023 at 9:45 AM.

## 2023-05-19 ENCOUNTER — NURSE TRIAGE (OUTPATIENT)
Dept: FAMILY MEDICINE | Facility: CLINIC | Age: 53
End: 2023-05-19
Payer: COMMERCIAL

## 2023-05-19 NOTE — TELEPHONE ENCOUNTER
"Patient calling reporting episodes of excessive sweating. Had an episode a month ago and again yesterday. Denies chest pain, shortness of breath, dizziness, fever, weakness. States he is overweight and pre-diabetic and seldom checks BGL's and did not know his BGL during the episodes. The episodes happen at work with minimal exertion. BP today 128/73 HR 82. Booked with provider. Advised ER if chest pain, SOB, dizziness.     Nyla Oh RN on 5/19/2023 at 11:16 AM      Reason for Disposition    [1] MODERATE sweating (e.g., interferes with normal activities like work or school) AND [2] possibly related to new medication or change in medication dosage    Additional Information    Negative: Patient sounds very sick or weak to the triager    Negative: Difficulty breathing    Negative: Fever    Negative: Chest pain or cardiac ischemia is suspected    Negative: Weakness    Negative: Dizziness and lightheadedness (e.g., feeling woozy, feeling like they might faint)    Negative: Heat exhaustion suspected (i.e., dehydration from heat exposure)    Negative: [1] Diabetes mellitus AND [2] sweating from low blood sugar (i.e., < 70 mg/dl or 3.9 mmol/l)    Negative: Shock suspected (e.g., cold/pale/clammy skin, too weak to stand, low BP, rapid pulse)    Negative: Sounds like a life-threatening emergency to the triager    Negative: [1] SEVERE sweating (e.g., drenched with sweat) AND [2] cause unknown    Negative: [1] NIGHT SWEATS occur (e.g., drenching sweat that occurs at night and has to change bed clothes or bed sheets) AND [2] cause unknown    Answer Assessment - Initial Assessment Questions  1. ONSET: \"When did the sweating start?\"       A month ago, then again yesterday  2. LOCATION: \"What part of your body has excessive sweating?\" (e.g., entire body; just face, underarms, palms, or soles of feet).       Whole body  3. SEVERITY: \"How bad is the sweating?\"    (Scale 1-10; or mild, moderate, severe)    -  MILD (1-3): " "doesn't interfere with normal activities     -  MODERATE (4-7): interferes with normal activities (e.g., work or school) or awakens from sleep; causes embarrassment in social situations     -  SEVERE (8-10): drenching sweats and has to change bed clothes or bed linens.      Drenching, excessive, hair gets wet  4. CAUSE: \"What do you think is causing the sweating?\"      Not sure  5. FEVER: \"Have you been having fevers?\"      denies  6. OTHER SYMPTOMS: \"Do you have any other symptoms?\" (e.g., chest pain, difficulty breathing, lightheadedness, weight loss)      denies    Protocols used: [id number]      "

## 2023-05-22 ENCOUNTER — TELEPHONE (OUTPATIENT)
Dept: FAMILY MEDICINE | Facility: CLINIC | Age: 53
End: 2023-05-22

## 2023-05-22 NOTE — TELEPHONE ENCOUNTER
General Call    Contacts       Type Contact Phone/Fax    05/22/2023 06:19 PM CDT Phone (Incoming) Boni BUSBY (Father) [phone]        Reason for Call:  Pt father states they have been waiting for a call from Dr. Morgan for a 5:20 appointment but did not receive a call. Notes on original appointment say they called & left a message x2 but father states they have been waiting by the phone since 5:20 & have received nothing.     What are your questions or concerns:  Complications regarding telephone appointment on 5/22 -- appointment was rescheduled for 5/24/22 at the same time, to the same phone number.     Date of last appointment with provider: 07/13/2022    Could we send this information to you [place] or would you prefer to receive a phone call?:   Patient would prefer a phone call   Okay to leave a detailed message?: Yes at Cell number on file:    Telephone Information:   Mobile [phone]

## 2023-05-24 ENCOUNTER — VIRTUAL VISIT (OUTPATIENT)
Dept: FAMILY MEDICINE | Facility: CLINIC | Age: 53
End: 2023-05-24
Payer: MEDICAID

## 2023-05-24 DIAGNOSIS — R61 NIGHT SWEATS: Primary | ICD-10-CM

## 2023-05-24 PROCEDURE — 99207 PR NO CHARGE LOS: CPT | Performed by: FAMILY MEDICINE

## 2023-05-24 NOTE — PROGRESS NOTES
Ciro is a 52 year old who is being evaluated via a billable telephone visit.    I called this number no answer,  I called number listed in chart and I got no answer.  I did leave a message.  He needs to make a lab only visit and get some labs, and see me in person in 1 week.    Keegan

## 2023-05-26 ENCOUNTER — LAB (OUTPATIENT)
Dept: LAB | Facility: CLINIC | Age: 53
End: 2023-05-26
Payer: MEDICAID

## 2023-05-26 DIAGNOSIS — R61 NIGHT SWEATS: ICD-10-CM

## 2023-05-26 LAB
CHOLEST SERPL-MCNC: 118 MG/DL
ERYTHROCYTE [DISTWIDTH] IN BLOOD BY AUTOMATED COUNT: 12.8 % (ref 10–15)
HBA1C MFR BLD: 5.8 % (ref 0–5.6)
HCT VFR BLD AUTO: 41.9 % (ref 40–53)
HDLC SERPL-MCNC: 46 MG/DL
HGB BLD-MCNC: 13.6 G/DL (ref 13.3–17.7)
LDLC SERPL CALC-MCNC: 65 MG/DL
MCH RBC QN AUTO: 29.8 PG (ref 26.5–33)
MCHC RBC AUTO-ENTMCNC: 32.5 G/DL (ref 31.5–36.5)
MCV RBC AUTO: 92 FL (ref 78–100)
NONHDLC SERPL-MCNC: 72 MG/DL
PLATELET # BLD AUTO: 223 10E3/UL (ref 150–450)
RBC # BLD AUTO: 4.56 10E6/UL (ref 4.4–5.9)
TRIGL SERPL-MCNC: 34 MG/DL
WBC # BLD AUTO: 6.5 10E3/UL (ref 4–11)

## 2023-05-26 PROCEDURE — 83036 HEMOGLOBIN GLYCOSYLATED A1C: CPT

## 2023-05-26 PROCEDURE — 36415 COLL VENOUS BLD VENIPUNCTURE: CPT

## 2023-05-26 PROCEDURE — 85027 COMPLETE CBC AUTOMATED: CPT

## 2023-05-26 PROCEDURE — 80061 LIPID PANEL: CPT

## 2023-06-02 ENCOUNTER — OFFICE VISIT (OUTPATIENT)
Dept: FAMILY MEDICINE | Facility: CLINIC | Age: 53
End: 2023-06-02
Payer: MEDICAID

## 2023-06-02 VITALS
HEIGHT: 73 IN | WEIGHT: 315 LBS | BODY MASS INDEX: 41.75 KG/M2 | HEART RATE: 78 BPM | SYSTOLIC BLOOD PRESSURE: 124 MMHG | TEMPERATURE: 96.9 F | DIASTOLIC BLOOD PRESSURE: 76 MMHG | RESPIRATION RATE: 24 BRPM | OXYGEN SATURATION: 97 %

## 2023-06-02 DIAGNOSIS — R61 EXCESSIVE SWEATING: Primary | ICD-10-CM

## 2023-06-02 LAB
ALBUMIN SERPL BCG-MCNC: 4.2 G/DL (ref 3.5–5.2)
ALP SERPL-CCNC: 82 U/L (ref 40–129)
ALT SERPL W P-5'-P-CCNC: 29 U/L (ref 10–50)
AST SERPL W P-5'-P-CCNC: 28 U/L (ref 10–50)
B BURGDOR IGG+IGM SER QL: 0.05
BILIRUB DIRECT SERPL-MCNC: <0.2 MG/DL (ref 0–0.3)
BILIRUB SERPL-MCNC: 0.5 MG/DL
CRP SERPL-MCNC: 4.51 MG/L
HIV 1+2 AB+HIV1 P24 AG SERPL QL IA: NONREACTIVE
Lab: NORMAL
PERFORMING LABORATORY: NORMAL
PROT SERPL-MCNC: 6.8 G/DL (ref 6.4–8.3)
TEST NAME: NORMAL
TSH SERPL DL<=0.005 MIU/L-ACNC: 1.84 UIU/ML (ref 0.3–4.2)

## 2023-06-02 PROCEDURE — 87468 ANAPLSMA PHGCYTOPHLM AMP PRB: CPT | Mod: 90 | Performed by: FAMILY MEDICINE

## 2023-06-02 PROCEDURE — 86666 EHRLICHIA ANTIBODY: CPT | Mod: 90 | Performed by: FAMILY MEDICINE

## 2023-06-02 PROCEDURE — 87798 DETECT AGENT NOS DNA AMP: CPT | Performed by: FAMILY MEDICINE

## 2023-06-02 PROCEDURE — 86618 LYME DISEASE ANTIBODY: CPT | Performed by: FAMILY MEDICINE

## 2023-06-02 PROCEDURE — 87484 EHRLICHA CHAFFEENSIS AMP PRB: CPT | Performed by: FAMILY MEDICINE

## 2023-06-02 PROCEDURE — 87040 BLOOD CULTURE FOR BACTERIA: CPT | Performed by: FAMILY MEDICINE

## 2023-06-02 PROCEDURE — 84443 ASSAY THYROID STIM HORMONE: CPT | Performed by: FAMILY MEDICINE

## 2023-06-02 PROCEDURE — 80076 HEPATIC FUNCTION PANEL: CPT | Performed by: FAMILY MEDICINE

## 2023-06-02 PROCEDURE — 36415 COLL VENOUS BLD VENIPUNCTURE: CPT | Performed by: FAMILY MEDICINE

## 2023-06-02 PROCEDURE — 87389 HIV-1 AG W/HIV-1&-2 AB AG IA: CPT | Performed by: FAMILY MEDICINE

## 2023-06-02 PROCEDURE — 99214 OFFICE O/P EST MOD 30 MIN: CPT | Performed by: FAMILY MEDICINE

## 2023-06-02 PROCEDURE — 86140 C-REACTIVE PROTEIN: CPT | Performed by: FAMILY MEDICINE

## 2023-06-02 ASSESSMENT — ENCOUNTER SYMPTOMS
MUSCULOSKELETAL NEGATIVE: 1
RESPIRATORY NEGATIVE: 1
NEUROLOGICAL NEGATIVE: 1
DIARRHEA: 1
CARDIOVASCULAR NEGATIVE: 1
DIAPHORESIS: 1
ENDOCRINE NEGATIVE: 1
EYES NEGATIVE: 1
HEMATOLOGIC/LYMPHATIC NEGATIVE: 1
ALLERGIC/IMMUNOLOGIC NEGATIVE: 1
PSYCHIATRIC NEGATIVE: 1

## 2023-06-02 ASSESSMENT — PAIN SCALES - GENERAL: PAINLEVEL: MODERATE PAIN (5)

## 2023-06-02 NOTE — PROGRESS NOTES
"  Assessment & Plan     (R61) Excessive sweating  (primary encounter diagnosis)  Comment: Patient here for excessive sweating. Etiology unclear at this time. Lab ordered. Patient will be notified of results.   Plan: HIV Antigen Antibody Combo, TSH with free T4         reflex, Anaplasma phagocytoph Antibodies IgG         IgM, Anaplasma phagocytophilum PCR ([facility]),         Lyme Disease Total Abs Bld with Reflex to         Confirm CLIA, Hepatic panel (Albumin, ALT, AST,        Bili, Alk Phos, TP), CRP, inflammation, Blood         Culture Peripheral Blood             BMI:   Estimated body mass index is 55.94 kg/m  as calculated from the following:    Height as of this encounter: 1.854 m (6' 1\").    Weight as of this encounter: 192.3 kg (424 lb).       FUTURE APPOINTMENTS:       - Follow-up visit as needed.    Mary June MD  [facility] LOPEZ Tanner is a 52 year old, presenting for the following health issues:    52 yr old male here for excessive sweating. This started about two weeks ago, he reports that the sweating is a bit of of proportion to what he is used too. I pointed out to the patient that the weather has been quite warm but he still insists that the sweating is quite excessive. He reports that he works in [place] and travels back and forth every day. He does not recall any tick bites.   He has not been more fatigued than usual. Denies any chest pain or shortness of breath. No weight loss. No cough or URI symptoms. He has had some mild diarrhea. He described his stools   Excessive Sweating (Here to discuss about excessive sweating.  States he was sweating a lot when working.  The one job let him go due to the sweating.  Diarrhea started at the same time.) and Diarrhea (He is not sure if the Metformin could be causing his symptoms. )        6/2/2023     7:24 AM   Additional Questions   Roomed by Leyda Gamez CMA     History of Present Illness       Reason for " visit:  Guillaume verdugo  Symptom onset:  3-4 weeks ago  Symptoms include:  Cramping gas  Symptom intensity:  Severe  Symptom progression:  Improving  Had these symptoms before:  Yes  Has tried/received treatment for these symptoms:  No  What makes it worse:  Pop  What makes it better:  Imodium    He eats 2-3 servings of fruits and vegetables daily.He consumes 0 sweetened beverage(s) daily.He exercises with enough effort to increase his heart rate 10 to 19 minutes per day.  He exercises with enough effort to increase his heart rate 3 or less days per week.   He is taking medications regularly.     Chief Complaint   Patient presents with     Excessive Sweating     Here to discuss about excessive sweating.  States he was sweating a lot when working.  The one job let him go due to the sweating.  Diarrhea started at the same time.     Diarrhea     He is not sure if the Metformin could be causing his symptoms.      Diarrhea  Onset/Duration: 3-4 weeks  Description:       Consistency of stool: runny       Blood in stool: No       Number of loose stools past 24 hours: 1  Progression of Symptoms: improving as there is not as much cramping.   Accompanying signs and symptoms:       Fever: No, just sweating a lot.       Nausea/Vomiting: No       Abdominal pain: YES- still having a little bit of cramping.        Weight loss: No       Episodes of constipation: No  History   Ill contacts: No  Recent use of antibiotics: No  Recent travels: YES- Just to [place].  Recent medication-new or changes(Rx or OTC): He did take and OTC prostate medication and that seemed to help. He did get a Hepatitis B injection.    Precipitating or alleviating factors: Pop makes it worse.  Therapies tried and outcome: Imodium AD and OTC prostate pill.          Review of Systems   Constitutional: Positive for diaphoresis.   HENT: Negative.    Eyes: Negative.    Respiratory: Negative.    Cardiovascular: Negative.    Gastrointestinal: Positive for  "diarrhea.   Endocrine: Negative.    Genitourinary: Negative.    Musculoskeletal: Negative.    Skin: Negative.    Allergic/Immunologic: Negative.    Neurological: Negative.    Hematological: Negative.    Psychiatric/Behavioral: Negative.             Objective    /76 (BP Location: Right arm, Patient Position: Chair, Cuff Size: Adult Large)   Pulse 78   Temp 96.9  F (36.1  C) (Tympanic)   Resp 24   Ht 1.854 m (6' 1\")   Wt (!) 192.3 kg (424 lb)   SpO2 97%   BMI 55.94 kg/m    Body mass index is 55.94 kg/m .  Physical Exam  Constitutional:       Appearance: Normal appearance. He is obese.   HENT:      Head: Normocephalic and atraumatic.      Right Ear: Tympanic membrane normal.      Left Ear: Tympanic membrane normal.      Mouth/Throat:      Mouth: Mucous membranes are moist.   Eyes:      Extraocular Movements: Extraocular movements intact.      Pupils: Pupils are equal, round, and reactive to light.   Cardiovascular:      Rate and Rhythm: Normal rate and regular rhythm.      Pulses: Normal pulses.      Heart sounds: No murmur heard.     No friction rub. No gallop.   Abdominal:      General: Abdomen is flat. Bowel sounds are normal.      Palpations: Abdomen is soft.   Musculoskeletal:         General: Normal range of motion.      Cervical back: Normal range of motion and neck supple.   Lymphadenopathy:      Cervical: No cervical adenopathy.      Upper Body:      Right upper body: No supraclavicular or axillary adenopathy.      Left upper body: No supraclavicular or axillary adenopathy.   Skin:     General: Skin is warm and dry.   Neurological:      Mental Status: He is alert and oriented to person, place, and time.   Psychiatric:         Mood and Affect: Mood normal.         Behavior: Behavior normal.     Labs pending.            "

## 2023-06-02 NOTE — LETTER
June 5, 2023      Ciro Borgesz  [address] [facility]   [facility] [address]        Dear ,    We are writing to inform you of your test results.    Your test results fall within the expected range(s) or remain unchanged from previous results.  Please continue with current treatment plan.    Resulted Orders   HIV Antigen Antibody Combo   Result Value Ref Range    HIV Antigen Antibody Combo Nonreactive Nonreactive      Comment:      HIV-1 p24 Ag & HIV-1/HIV-2 Ab Not Detected   TSH with free T4 reflex   Result Value Ref Range    TSH 1.84 0.30 - 4.20 uIU/mL   Anaplasma phagocytoph Antibodies IgG IgM   Result Value Ref Range    A Phagocytophil IgG <1:80 <1:80      Comment:      INTERPRETIVE INFORMATION: A. phagocytophilum (HGA)   Antibody, IgG      Less than 1:80 - No significant level of IgG antibodies                     to A. phagocytophilum detected.    Greater than or    equal to 1:80  - Suggestive of a recent or past infection                     with A. phagocytophilum.    This test was developed and its performance characteristics   determined by [facility]. It has not been cleared or   approved by the US Food and Drug Administration. This test   was performed in a CLIA certified laboratory and is   intended for clinical purposes.    A Phagocytophil IgM < 1:16 <1:16      Comment:      INTERPRETIVE INFORMATION: A. phagocytophilum (HGA)   Antibody, IgM      Less than 1:16 - No significant level of IgM antibodies                     to A. phagocytophilum detected.    Greater than or    equal to 1:16  - Suggestive of a current or recent                     infection with A. phagocytophilum.    This test was developed and its performance characteristics   determined by [facility]. It has not been cleared or   approved by the US Food and Drug Administration. This test   was performed in a CLIA certified laboratory and is   intended for clinical purposes.  Performed By: [facility]  [address]  [place] [address]  : Luis Armando Campbell MD, PhD   Lyme Disease Total Abs Bld with Reflex to Confirm CLIA   Result Value Ref Range    Lyme Disease Antibodies Total 0.05 <0.90      Comment:      Non-reactive, Absence of detectable Borrelia burgdorferi antibodies. A non-reactive result does not exclude the possibility of Borrelia burgdorferi infection. If early Lyme disease is suspected, a second sample should be collected and tested 2 to 4 weeks later.   Hepatic panel (Albumin, ALT, AST, Bili, Alk Phos, TP)   Result Value Ref Range    Protein Total 6.8 6.4 - 8.3 g/dL    Albumin 4.2 3.5 - 5.2 g/dL    Bilirubin Total 0.5 <=1.2 mg/dL    Alkaline Phosphatase 82 40 - 129 U/L    AST 28 10 - 50 U/L    ALT 29 10 - 50 U/L    Bilirubin Direct <0.20 0.00 - 0.30 mg/dL   CRP, inflammation   Result Value Ref Range    CRP Inflammation 4.51 <5.00 mg/L   Blood Culture Peripheral Blood   Result Value Ref Range    Culture No growth after 2 days    [facility]; [id number]; Ehrlichia and Anaplasma Species by PCR (Laboratory Miscellaneous Order)   Result Value Ref Range    Performing Laboratory [facility]     Test Name Ehrlichia and Anaplasma Species by PCR     Test Code [id number]    [id number]; Ehrlichia and Anaplasma Species by PCR: [facility] Miscellaneous Test   Result Value Ref Range    Miscellaneous Test SEE NOTE       Comment:      Test name                    Result Flag  Units  RefIntvl  ------------------------------------------------------------  Anaplasma phagocytophilum by PCR                         Not Detected                         Ehrlichia chaffeensis by PCR                         Not Detected                         Ehrlichia ewingii/canis by PCR                         Not Detected                         Ehrlichia muris-like by PCR                         Not Detected                         INTERPRETIVE INFORMATION:  Ehrlichia and Anaplasma Species   by PCR      A negative  result does not rule out the presence of PCR   inhibitors in the patient specimen or test-specific nucleic   acid in concentrations below the level of detection by this   assay.    This test was developed and its performance characteristics   determined by [facility]. It has not been cleared or   approved by the US Food and Drug Administration. This test   was performed in a CLIA certified laboratory and is   intended  for clinical purposes.  Performed by [facility],  [address] [place] [id number] [phone]  [email or web address], Luis Armando Campbell MD, PHD, Lab. Director       If you have any questions or concerns, please call the clinic at the number listed above.       Sincerely,      Mary June MD

## 2023-06-04 LAB
A PHAGOCYTOPH IGG TITR SER IF: NORMAL {TITER}
A PHAGOCYTOPH IGM TITR SER IF: NORMAL {TITER}
MISCELLANEOUS TEST 1 (ARUP): NORMAL

## 2023-06-04 NOTE — RESULT ENCOUNTER NOTE
Please inform patient that test result was within normal parameters.   Thank you.     Mary Gutierrez  Your labs so far have all come back negative. Lets keep an eye on this excessive sweating and if it persists lets consider the CT abdomen /pelvis and chest that I suggested.   Thanks  Dr June

## 2023-06-05 DIAGNOSIS — F25.1 SCHIZOAFFECTIVE DISORDER, DEPRESSIVE TYPE (H): Primary | ICD-10-CM

## 2023-06-05 NOTE — RESULT ENCOUNTER NOTE
Please inform patient that test result was within normal parameters.   Thank you.     Mary June M.D.

## 2023-06-07 LAB — BACTERIA BLD CULT: NO GROWTH

## 2023-06-26 DIAGNOSIS — I89.0 LYMPHEDEMA OF BOTH LOWER EXTREMITIES: ICD-10-CM

## 2023-06-28 RX ORDER — FUROSEMIDE 20 MG
TABLET ORAL
Qty: 90 TABLET | Refills: 1 | Status: SHIPPED | OUTPATIENT
Start: 2023-06-28 | End: 2023-09-18

## 2023-06-28 NOTE — TELEPHONE ENCOUNTER
Routing refill request to provider for review/approval because:  Patient needs to be seen because:  Due for annual exam    Black Carroll RN

## 2023-07-07 ENCOUNTER — NURSE TRIAGE (OUTPATIENT)
Dept: NURSING | Facility: CLINIC | Age: 53
End: 2023-07-07

## 2023-07-07 ENCOUNTER — ALLIED HEALTH/NURSE VISIT (OUTPATIENT)
Dept: FAMILY MEDICINE | Facility: CLINIC | Age: 53
End: 2023-07-07
Payer: MEDICAID

## 2023-07-07 DIAGNOSIS — Z23 ENCOUNTER FOR IMMUNIZATION: Primary | ICD-10-CM

## 2023-07-07 PROCEDURE — 90471 IMMUNIZATION ADMIN: CPT

## 2023-07-07 PROCEDURE — 99207 PR NO CHARGE NURSE ONLY: CPT

## 2023-07-07 PROCEDURE — 90746 HEPB VACCINE 3 DOSE ADULT IM: CPT

## 2023-07-07 NOTE — TELEPHONE ENCOUNTER
Pt calling to see when his next HepB vaccine should be. Informed pt he can do this anytime as it was due 6/6/23. Transferred pt to [facility] for appt. No triage done.    Keri Marie RN, BSN  [facility] Nurse Advisor 9:49 AM 7/7/2023    Reason for Disposition    Information only question and nurse able to answer    Additional Information    Negative: Nursing judgment    Negative: Nursing judgment    Negative: Nursing judgment    Negative: Nursing judgment    Protocols used: INFORMATION ONLY CALL - NO TRIAGE-A-OH

## 2023-07-07 NOTE — PROGRESS NOTES
Prior to immunization administration, verified patients identity using patient s name and date of birth. Please see Immunization Activity for additional information.     Screening Questionnaire for Adult Immunization    Are you sick today?   No   Do you have allergies to medications, food, a vaccine component or latex?   No   Have you ever had a serious reaction after receiving a vaccination?   No   Do you have a long-term health problem with heart, lung, kidney, or metabolic disease (e.g., diabetes), asthma, a blood disorder, no spleen, complement component deficiency, a cochlear implant, or a spinal fluid leak?  Are you on long-term aspirin therapy?   No   Do you have cancer, leukemia, HIV/AIDS, or any other immune system problem?   No   Do you have a parent, brother, or sister with an immune system problem?   No   In the past 3 months, have you taken medications that affect  your immune system, such as prednisone, other steroids, or anticancer drugs; drugs for the treatment of rheumatoid arthritis, Crohn s disease, or psoriasis; or have you had radiation treatments?   No   Have you had a seizure, or a brain or other nervous system problem?   No   During the past year, have you received a transfusion of blood or blood    products, or been given immune (gamma) globulin or antiviral drug?   No   For women: Are you pregnant or is there a chance you could become       pregnant during the next month?   No   Have you received any vaccinations in the past 4 weeks?   No     Immunization questionnaire answers were all negative.    I have reviewed the following standing orders:   This patient is due and qualifies for the Hepatitis B vaccine.    Click here for Hepatitis B Standing Order    I have reviewed the vaccines inclusion and exclusion criteria; No concerns regarding eligibility.         Patient instructed to remain in clinic for 15 minutes afterwards, and to report any adverse reactions.     Screening performed by  Avis Pillai MA on 7/7/2023 at 3:22 PM.

## 2023-08-15 DIAGNOSIS — F25.1 SCHIZOAFFECTIVE DISORDER, DEPRESSIVE TYPE (H): Primary | ICD-10-CM

## 2023-09-07 ENCOUNTER — LAB (OUTPATIENT)
Dept: LAB | Facility: CLINIC | Age: 53
End: 2023-09-07
Payer: MEDICAID

## 2023-09-07 DIAGNOSIS — F25.1 SCHIZOAFFECTIVE DISORDER, DEPRESSIVE TYPE (H): ICD-10-CM

## 2023-09-07 LAB
ALBUMIN SERPL BCG-MCNC: 4.2 G/DL (ref 3.5–5.2)
ALP SERPL-CCNC: 92 U/L (ref 40–129)
ALT SERPL W P-5'-P-CCNC: 25 U/L (ref 0–70)
ANION GAP SERPL CALCULATED.3IONS-SCNC: 8 MMOL/L (ref 7–15)
AST SERPL W P-5'-P-CCNC: 30 U/L (ref 0–45)
BILIRUB SERPL-MCNC: 0.4 MG/DL
BUN SERPL-MCNC: 11.1 MG/DL (ref 6–20)
CALCIUM SERPL-MCNC: 9.3 MG/DL (ref 8.6–10)
CHLORIDE SERPL-SCNC: 102 MMOL/L (ref 98–107)
CHOLEST SERPL-MCNC: 144 MG/DL
CREAT SERPL-MCNC: 0.74 MG/DL (ref 0.67–1.17)
DEPRECATED HCO3 PLAS-SCNC: 28 MMOL/L (ref 22–29)
EGFRCR SERPLBLD CKD-EPI 2021: >90 ML/MIN/1.73M2
GLUCOSE SERPL-MCNC: 93 MG/DL (ref 70–99)
HBA1C MFR BLD: 5.6 % (ref 0–5.6)
HDLC SERPL-MCNC: 44 MG/DL
LDLC SERPL CALC-MCNC: 86 MG/DL
NONHDLC SERPL-MCNC: 100 MG/DL
POTASSIUM SERPL-SCNC: 4.5 MMOL/L (ref 3.4–5.3)
PROT SERPL-MCNC: 7.1 G/DL (ref 6.4–8.3)
SODIUM SERPL-SCNC: 138 MMOL/L (ref 136–145)
TRIGL SERPL-MCNC: 70 MG/DL

## 2023-09-07 PROCEDURE — 80053 COMPREHEN METABOLIC PANEL: CPT

## 2023-09-07 PROCEDURE — 80061 LIPID PANEL: CPT

## 2023-09-07 PROCEDURE — 36415 COLL VENOUS BLD VENIPUNCTURE: CPT

## 2023-09-07 PROCEDURE — 83036 HEMOGLOBIN GLYCOSYLATED A1C: CPT

## 2023-09-17 DIAGNOSIS — I89.0 LYMPHEDEMA OF BOTH LOWER EXTREMITIES: ICD-10-CM

## 2023-09-18 RX ORDER — FUROSEMIDE 20 MG
TABLET ORAL
Qty: 90 TABLET | Refills: 1 | Status: SHIPPED | OUTPATIENT
Start: 2023-09-18 | End: 2024-07-22

## 2023-09-18 NOTE — TELEPHONE ENCOUNTER
"Routing refill request to provider for review/approval because:  Patient needs to be seen because:  greater than 1 year since last annual physical.              Requested Prescriptions   Pending Prescriptions Disp Refills    furosemide (LASIX) 20 MG tablet [Pharmacy Med Name: FUROSEMIDE 20MG TABLET] 90 tablet 1     Sig: TAKE 1 TABLET BY MOUTH DAILY       Diuretics (Including Combos) Protocol Passed - 9/17/2023  4:14 PM        Passed - Blood pressure under 140/90 in past 12 months     BP Readings from Last 3 Encounters:   06/02/23 124/76   07/13/22 130/88   04/06/22 134/88                 Passed - Recent (12 mo) or future (30 days) visit within the authorizing provider's specialty     Patient has had an office visit with the authorizing provider or a provider within the authorizing providers department within the previous 12 mos or has a future within next 30 days. See \"Patient Info\" tab in inbasket, or \"Choose Columns\" in Meds & Orders section of the refill encounter.              Passed - Medication is active on med list        Passed - Patient is age 18 or older        Passed - Normal serum creatinine on file in past 12 months     Recent Labs   Lab Test 09/07/23  1048   CR 0.74              Passed - Normal serum potassium on file in past 12 months     Recent Labs   Lab Test 09/07/23  1048   POTASSIUM 4.5                    Passed - Normal serum sodium on file in past 12 months     Recent Labs   Lab Test 09/07/23  1048                          Boby Hodges RN 09/18/23 1:54 PM   "

## 2023-09-21 ENCOUNTER — IMMUNIZATION (OUTPATIENT)
Dept: FAMILY MEDICINE | Facility: CLINIC | Age: 53
End: 2023-09-21
Payer: MEDICAID

## 2023-09-21 PROCEDURE — 90682 RIV4 VACC RECOMBINANT DNA IM: CPT

## 2023-09-21 PROCEDURE — 90471 IMMUNIZATION ADMIN: CPT

## 2023-11-19 ENCOUNTER — APPOINTMENT (OUTPATIENT)
Dept: GENERAL RADIOLOGY | Facility: CLINIC | Age: 53
End: 2023-11-19
Attending: EMERGENCY MEDICINE
Payer: COMMERCIAL

## 2023-11-19 ENCOUNTER — HOSPITAL ENCOUNTER (EMERGENCY)
Facility: CLINIC | Age: 53
Discharge: HOME | End: 2023-11-19
Attending: EMERGENCY MEDICINE | Admitting: EMERGENCY MEDICINE
Payer: COMMERCIAL

## 2023-11-19 VITALS
SYSTOLIC BLOOD PRESSURE: 143 MMHG | OXYGEN SATURATION: 91 % | RESPIRATION RATE: 20 BRPM | HEIGHT: 73 IN | TEMPERATURE: 97.4 F | WEIGHT: 315 LBS | DIASTOLIC BLOOD PRESSURE: 93 MMHG | HEART RATE: 86 BPM | BODY MASS INDEX: 41.75 KG/M2

## 2023-11-19 DIAGNOSIS — J06.9 UPPER RESPIRATORY TRACT INFECTION, UNSPECIFIED TYPE: ICD-10-CM

## 2023-11-19 LAB
FLUAV RNA SPEC QL NAA+PROBE: NEGATIVE
FLUBV RNA RESP QL NAA+PROBE: NEGATIVE
GROUP A STREP BY PCR: NOT DETECTED
RSV RNA SPEC NAA+PROBE: NEGATIVE
SARS-COV-2 RNA RESP QL NAA+PROBE: NEGATIVE

## 2023-11-19 PROCEDURE — 99284 EMERGENCY DEPT VISIT MOD MDM: CPT | Performed by: EMERGENCY MEDICINE

## 2023-11-19 PROCEDURE — 71046 X-RAY EXAM CHEST 2 VIEWS: CPT

## 2023-11-19 PROCEDURE — 87651 STREP A DNA AMP PROBE: CPT | Performed by: EMERGENCY MEDICINE

## 2023-11-19 PROCEDURE — 87637 SARSCOV2&INF A&B&RSV AMP PRB: CPT | Performed by: EMERGENCY MEDICINE

## 2023-11-19 PROCEDURE — 99284 EMERGENCY DEPT VISIT MOD MDM: CPT | Mod: 25 | Performed by: EMERGENCY MEDICINE

## 2023-11-19 RX ORDER — AZITHROMYCIN 250 MG/1
TABLET, FILM COATED ORAL
Qty: 6 TABLET | Refills: 0 | Status: SHIPPED | OUTPATIENT
Start: 2023-11-19 | End: 2023-11-24

## 2023-11-19 ASSESSMENT — ACTIVITIES OF DAILY LIVING (ADL): ADLS_ACUITY_SCORE: 33

## 2023-11-19 NOTE — ED TRIAGE NOTES
URI since about Tuesday, productive cough     Triage Assessment (Adult)       Row Name 11/19/23 2882          Triage Assessment    Airway WDL WDL        Respiratory WDL    Respiratory WDL X        Skin Circulation/Temperature WDL    Skin Circulation/Temperature WDL WDL        Cardiac WDL    Cardiac WDL WDL        Peripheral/Neurovascular WDL    Peripheral Neurovascular WDL WDL        Cognitive/Neuro/Behavioral WDL    Cognitive/Neuro/Behavioral WDL WDL

## 2023-11-19 NOTE — ED PROVIDER NOTES
History     Chief Complaint   Patient presents with    Cough     HPI  Ciro Monet is a 53 year old male who presents to the emergency department reporting nasal congestion, cough, and mild chills and sweats for the past 4 days.  He works at [employer] so is exposed to a lot of people but does not have any obvious known sick exposures.  He denies feeling short of breath.  He has no other complaints at this time.    Allergies:  Allergies   Allergen Reactions    Adhesive Tape Rash     3M Plastic adhesive transpore tape per dad. Blisters and rash.    Pine Swelling and Difficulty breathing     Eyes swelling & difficultly breathing.       Problem List:    Patient Active Problem List    Diagnosis Date Noted    Nontraumatic complete tear of right rotator cuff 02/17/2022     Priority: Medium    Schizoaffective disorder, chronic condition (H) 12/11/2017     Priority: Medium    Incarcerated hernia 10/29/2016     Priority: Medium    Umbilical hernia, incarcerated 10/29/2016     Priority: Medium    Renal cyst needs ct 6/15 11/04/2014     Priority: Medium    Localized superficial swelling, mass, or lump 06/19/2014     Priority: Medium    Morbid obesity (H) 06/30/2011     Priority: Medium    HYPERLIPIDEMIA LDL GOAL <160 10/31/2010     Priority: Medium    CHARITY (Obstructive Sleep Apnea)-severe () 02/09/2010     Priority: Medium    Esophageal reflux 03/05/2007     Priority: Medium    Moderate depressed bipolar I disorder (H) 05/08/2006     Priority: Medium     Problem list name updated by automated process. Provider to review          Past Medical History:    Past Medical History:   Diagnosis Date    Hiatal hernia     Morbid obesity with BMI of 45.0-49.9, adult (H)     CHARITY (obstructive sleep apnea) 4/2010       Past Surgical History:    Past Surgical History:   Procedure Laterality Date    ARTHROSCOPY SHOULDER, OPEN ROTATOR CUFF REPAIR, COMBINED Right 2/17/2022    Procedure: Right Shoulder Arthroscopy , Intra articular  Subjective:       Patient ID: Fredi Heredia oriented to person, place, and  "Debridement, Subacromial Decompression, Biceps Tenodesis, Rotator Cuff Repair;  Surgeon: Stevan Nieves MD;  Location: [facility] OR     TOOTH EXTRACTION W/FORCEP      HEMORRHOID INJECTION SCLEROSING SOLUTION      HERNIORRHAPHY UMBILICAL N/A 10/29/2016    Procedure: HERNIORRHAPHY UMBILICAL;  Surgeon: Lori Barron MD;  Location: [facility] OR    LASIK  ?2005?    ZZHC REPAIR EPIGASTRIC HERNIA,REDUC  11/23/07       Family History:    Family History   Problem Relation Age of Onset    Hypertension Father     Genitourinary Problems Father         kidney stones    C.A.D. Maternal Grandfather     Colon Cancer Paternal Grandfather         ?unsure    Alcoholism Paternal Grandfather     Genitourinary Problems Brother         kidney stones    Genitourinary Problems Brother         kidney stones       Social History:  Marital Status:  Single [1]  Social History     Tobacco Use    Smoking status: Never    Smokeless tobacco: Never   Vaping Use    Vaping Use: Never used   Substance Use Topics    Alcohol use: No     Alcohol/week: 0.0 standard drinks of alcohol    Drug use: No        Medications:    azithromycin (ZITHROMAX Z-DELBERT) 250 MG tablet  ABILIFY 15 MG tablet  Cholecalciferol (VITAMIN D-3) 25 MCG (1000 UT) CAPS  co-enzyme Q-10 100 MG CAPS capsule  furosemide (LASIX) 20 MG tablet  INVEGA SUSTENNA 234 MG/1.5ML PIETER  metFORMIN (GLUCOPHAGE-XR) 500 MG 24 hr tablet  order for DME  Turmeric Curcumin 500 MG CAPS          Review of Systems   All other systems reviewed and are negative.      Physical Exam   BP: (!) 153/101  Pulse: 86  Temp: 97.4  F (36.3  C)  Resp: 20  Height: 185.4 cm (6' 1\")  Weight: (!) 193.2 kg (426 lb)  SpO2: 93 %      Physical Exam  Vitals and nursing note reviewed.   Constitutional:       General: He is not in acute distress.     Appearance: He is well-developed. He is not ill-appearing or toxic-appearing.   HENT:      Head: Normocephalic and atraumatic.   Eyes:      General: No scleral icterus.     Pupils: Pupils " are equal, round, and reactive to light.   Cardiovascular:      Rate and Rhythm: Normal rate and regular rhythm.   Pulmonary:      Effort: Pulmonary effort is normal. No respiratory distress.      Breath sounds: Normal breath sounds.   Musculoskeletal:      Cervical back: Normal range of motion and neck supple.   Skin:     General: Skin is warm and dry.      Coloration: Skin is not pale.      Findings: No rash.   Neurological:      Mental Status: He is alert and oriented to person, place, and time.      Sensory: No sensory deficit.   Psychiatric:         Behavior: Behavior normal.         ED Course                 Procedures                  Results for orders placed or performed during the hospital encounter of 11/19/23 (from the past 24 hour(s))   Symptomatic Influenza A/B, RSV, & SARS-CoV2 PCR (COVID-19) Nasopharyngeal    Specimen: Nasopharyngeal; Swab   Result Value Ref Range    Influenza A PCR Negative Negative    Influenza B PCR Negative Negative    RSV PCR Negative Negative    SARS CoV2 PCR Negative Negative    Narrative    Testing was performed using the Xpert Xpress CoV2/Flu/RSV Assay on the [employer] Gene[employer]pert Instrument. This test should be ordered for the detection of SARS-CoV-2, influenza, and RSV viruses in individuals who meet clinical and/or epidemiological criteria. Test performance is unknown in asymptomatic patients. This test is for in vitro diagnostic use under the FDA EUA for laboratories certified under CLIA to perform high or moderate complexity testing. This test has not been FDA cleared or approved. A negative result does not rule out the presence of PCR inhibitors in the specimen or target RNA in concentration below the limit of detection for the assay. If only one viral target is positive but coinfection with multiple targets is suspected, the sample should be re-tested with another FDA cleared, approved, or authorized test, if coinfection would change clinical management. This test was  validated by the [facility]. These laboratories are certified under the Clinical Laboratory Improvement Amendments of 1988 (CLIA-88) as qualified to perform high complexity laboratory testing.   XR Chest 2 Views    Narrative    EXAM: XR CHEST 2 VIEWS  LOCATION: [facility]  DATE: 11/19/2023    INDICATION: Cough.  COMPARISON: Chest radiograph 03/31/2017.      Impression    IMPRESSION: Lungs are clear. No evidence of pneumonia. No pleural effusions or pneumothorax. Stable, nonenlarged cardiac silhouette.   Group A Streptococcus PCR Throat Swab    Specimen: Throat; Swab   Result Value Ref Range    Group A strep by PCR Not Detected Not Detected    Narrative    The Xpert Xpress Strep A test, performed on the [employer], is a rapid, qualitative in vitro diagnostic test for the detection of Streptococcus pyogenes (Group A ß-hemolytic Streptococcus, Strep A) in throat swab specimens from patients with signs and symptoms of pharyngitis. The Xpert Xpress Strep A test can be used as an aid in the diagnosis of Group A Streptococcal pharyngitis. The assay is not intended to monitor treatment for Group A Streptococcus infections. The Xpert Xpress Strep A test utilizes an automated real-time polymerase chain reaction (PCR) to detect Streptococcus pyogenes DNA.       Medications - No data to display    Assessments & Plan (with Medical Decision Making)     I have reviewed the nursing notes.    I have reviewed the findings, diagnosis, plan and need for follow up with the patient.  This patient presented to the emergency department with complaints of sore throat cough, fever and chills.  Patient appears in no acute distress.  He is not hypoxic.  Exam and history do not seem consistent with cardiac issue such as heart failure.  Influenza, RSV and COVID testing is negative as is strep testing.  Chest x-ray demonstrated no cardiomegaly, pulmonary edema, infiltrates or  consolidation.  No pneumothorax or pleural effusion is noted.  These images were independently reviewed by myself and I concur with radiology's read.  Suspect a viral upper respiratory tract infection.  Patient was provided with a prescription for Zithromax and told that if his symptoms persist over the next few days he could try this in case his symptoms are secondary to an atypical infection.  He was cautioned to return to the emergency department for worsening symptoms or other concerns and was discharged with instructions for care and follow-up in good condition.        Discharge Medication List as of 11/19/2023  6:12 PM        START taking these medications    Details   azithromycin (ZITHROMAX Z-DELBERT) 250 MG tablet Two tablets on the first day, then one tablet daily for the next 4 days, Disp-6 tablet, R-0, Local Print             Final diagnoses:   Upper respiratory tract infection, unspecified type       11/19/2023   [facility] EMERGENCY DEPT       Tanmay Hummel MD  11/20/23 [id number]

## 2023-11-20 NOTE — DISCHARGE INSTRUCTIONS
Follow-up with your primary clinic as needed.    Rest and stay well-hydrated by drinking plenty fluids.    Tylenol and/or ibuprofen for pain and/or fever.    If you continue to have symptoms over the next 2 days, fill prescription for azithromycin and take as directed.    Return to the emergency department for any problems.

## 2024-01-14 ENCOUNTER — HEALTH MAINTENANCE LETTER (OUTPATIENT)
Age: 54
End: 2024-01-14

## 2024-05-14 ENCOUNTER — LAB (OUTPATIENT)
Dept: LAB | Facility: CLINIC | Age: 54
End: 2024-05-14
Payer: COMMERCIAL

## 2024-05-14 DIAGNOSIS — F25.1 SCHIZOAFFECTIVE DISORDER, DEPRESSIVE TYPE (H): ICD-10-CM

## 2024-05-14 LAB
CHOLEST SERPL-MCNC: 153 MG/DL
FASTING STATUS PATIENT QL REPORTED: YES
HDLC SERPL-MCNC: 48 MG/DL
LDLC SERPL CALC-MCNC: 94 MG/DL
NONHDLC SERPL-MCNC: 105 MG/DL
TRIGL SERPL-MCNC: 54 MG/DL

## 2024-05-14 PROCEDURE — 36415 COLL VENOUS BLD VENIPUNCTURE: CPT

## 2024-05-14 PROCEDURE — 80061 LIPID PANEL: CPT

## 2024-07-22 DIAGNOSIS — I89.0 LYMPHEDEMA OF BOTH LOWER EXTREMITIES: ICD-10-CM

## 2024-07-24 RX ORDER — FUROSEMIDE 20 MG
20 TABLET ORAL DAILY
Qty: 90 TABLET | Refills: 1 | Status: SHIPPED | OUTPATIENT
Start: 2024-07-24

## 2024-07-29 DIAGNOSIS — I89.0 LYMPHEDEMA OF BOTH LOWER EXTREMITIES: ICD-10-CM

## 2024-07-29 RX ORDER — FUROSEMIDE 20 MG
20 TABLET ORAL DAILY
Qty: 90 TABLET | Refills: 1 | OUTPATIENT
Start: 2024-07-29

## 2024-08-05 ENCOUNTER — TRANSFERRED RECORDS (OUTPATIENT)
Dept: HEALTH INFORMATION MANAGEMENT | Facility: CLINIC | Age: 54
End: 2024-08-05
Payer: COMMERCIAL

## 2024-08-05 DIAGNOSIS — I89.0 LYMPHEDEMA OF BOTH LOWER EXTREMITIES: ICD-10-CM

## 2024-08-05 LAB — RETINOPATHY: NEGATIVE

## 2024-08-05 RX ORDER — FUROSEMIDE 20 MG
20 TABLET ORAL DAILY
Qty: 90 TABLET | Refills: 1 | OUTPATIENT
Start: 2024-08-05

## 2024-08-12 DIAGNOSIS — I89.0 LYMPHEDEMA OF BOTH LOWER EXTREMITIES: ICD-10-CM

## 2024-08-13 RX ORDER — FUROSEMIDE 20 MG
20 TABLET ORAL DAILY
Qty: 90 TABLET | Refills: 1 | OUTPATIENT
Start: 2024-08-13

## 2024-09-03 ENCOUNTER — DOCUMENTATION ONLY (OUTPATIENT)
Dept: FAMILY MEDICINE | Facility: CLINIC | Age: 54
End: 2024-09-03
Payer: COMMERCIAL

## 2024-09-03 DIAGNOSIS — G47.33 OBSTRUCTIVE SLEEP APNEA (ADULT) (PEDIATRIC): Primary | ICD-10-CM

## 2024-09-18 SDOH — HEALTH STABILITY: PHYSICAL HEALTH: ON AVERAGE, HOW MANY DAYS PER WEEK DO YOU ENGAGE IN MODERATE TO STRENUOUS EXERCISE (LIKE A BRISK WALK)?: 5 DAYS

## 2024-09-23 ENCOUNTER — OFFICE VISIT (OUTPATIENT)
Dept: FAMILY MEDICINE | Facility: CLINIC | Age: 54
End: 2024-09-23
Payer: COMMERCIAL

## 2024-09-23 VITALS
WEIGHT: 315 LBS | HEIGHT: 73 IN | SYSTOLIC BLOOD PRESSURE: 134 MMHG | RESPIRATION RATE: 20 BRPM | DIASTOLIC BLOOD PRESSURE: 82 MMHG | OXYGEN SATURATION: 92 % | HEART RATE: 90 BPM | TEMPERATURE: 98.4 F | BODY MASS INDEX: 41.75 KG/M2

## 2024-09-23 DIAGNOSIS — R73.09 ELEVATED GLUCOSE: ICD-10-CM

## 2024-09-23 DIAGNOSIS — Z00.00 ROUTINE GENERAL MEDICAL EXAMINATION AT A HEALTH CARE FACILITY: Primary | ICD-10-CM

## 2024-09-23 DIAGNOSIS — F25.9 SCHIZOAFFECTIVE DISORDER, CHRONIC CONDITION (H): ICD-10-CM

## 2024-09-23 DIAGNOSIS — Z12.5 SCREENING FOR PROSTATE CANCER: ICD-10-CM

## 2024-09-23 DIAGNOSIS — E78.5 HYPERLIPIDEMIA LDL GOAL <160: ICD-10-CM

## 2024-09-23 DIAGNOSIS — G47.33 OBSTRUCTIVE SLEEP APNEA SYNDROME: ICD-10-CM

## 2024-09-23 LAB
ALBUMIN SERPL BCG-MCNC: 4 G/DL (ref 3.5–5.2)
ALP SERPL-CCNC: 98 U/L (ref 40–150)
ALT SERPL W P-5'-P-CCNC: 22 U/L (ref 0–70)
ANION GAP SERPL CALCULATED.3IONS-SCNC: 13 MMOL/L (ref 7–15)
AST SERPL W P-5'-P-CCNC: 29 U/L (ref 0–45)
BILIRUB SERPL-MCNC: 0.3 MG/DL
BUN SERPL-MCNC: 11.8 MG/DL (ref 6–20)
CALCIUM SERPL-MCNC: 8.9 MG/DL (ref 8.8–10.4)
CHLORIDE SERPL-SCNC: 100 MMOL/L (ref 98–107)
CHOLEST SERPL-MCNC: 139 MG/DL
CREAT SERPL-MCNC: 0.71 MG/DL (ref 0.67–1.17)
EGFRCR SERPLBLD CKD-EPI 2021: >90 ML/MIN/1.73M2
EST. AVERAGE GLUCOSE BLD GHB EST-MCNC: 123 MG/DL
FASTING STATUS PATIENT QL REPORTED: NO
FASTING STATUS PATIENT QL REPORTED: NO
GLUCOSE SERPL-MCNC: 130 MG/DL (ref 70–99)
HBA1C MFR BLD: 5.9 % (ref 0–5.6)
HCO3 SERPL-SCNC: 26 MMOL/L (ref 22–29)
HDLC SERPL-MCNC: 37 MG/DL
LDLC SERPL CALC-MCNC: 76 MG/DL
NONHDLC SERPL-MCNC: 102 MG/DL
POTASSIUM SERPL-SCNC: 4 MMOL/L (ref 3.4–5.3)
PROT SERPL-MCNC: 7 G/DL (ref 6.4–8.3)
PSA SERPL DL<=0.01 NG/ML-MCNC: 3.68 NG/ML (ref 0–3.5)
SODIUM SERPL-SCNC: 139 MMOL/L (ref 135–145)
TRIGL SERPL-MCNC: 129 MG/DL

## 2024-09-23 PROCEDURE — 99396 PREV VISIT EST AGE 40-64: CPT | Mod: 25 | Performed by: FAMILY MEDICINE

## 2024-09-23 PROCEDURE — 80061 LIPID PANEL: CPT | Performed by: FAMILY MEDICINE

## 2024-09-23 PROCEDURE — 83036 HEMOGLOBIN GLYCOSYLATED A1C: CPT | Performed by: FAMILY MEDICINE

## 2024-09-23 PROCEDURE — G0103 PSA SCREENING: HCPCS | Performed by: FAMILY MEDICINE

## 2024-09-23 PROCEDURE — 91320 SARSCV2 VAC 30MCG TRS-SUC IM: CPT | Performed by: FAMILY MEDICINE

## 2024-09-23 PROCEDURE — 90471 IMMUNIZATION ADMIN: CPT | Performed by: FAMILY MEDICINE

## 2024-09-23 PROCEDURE — 36415 COLL VENOUS BLD VENIPUNCTURE: CPT | Performed by: FAMILY MEDICINE

## 2024-09-23 PROCEDURE — 90746 HEPB VACCINE 3 DOSE ADULT IM: CPT | Performed by: FAMILY MEDICINE

## 2024-09-23 PROCEDURE — 90480 ADMN SARSCOV2 VAC 1/ONLY CMP: CPT | Performed by: FAMILY MEDICINE

## 2024-09-23 PROCEDURE — 80053 COMPREHEN METABOLIC PANEL: CPT | Performed by: FAMILY MEDICINE

## 2024-09-23 ASSESSMENT — PAIN SCALES - GENERAL: PAINLEVEL: NO PAIN (0)

## 2024-09-23 NOTE — PROGRESS NOTES
"Preventive Care Visit  [facility] ALICIA Morgan MD, Family Practice  Sep 23, 2024      Assessment & Plan     Routine general medical examination at a health care facility    HYPERLIPIDEMIA LDL GOAL <160  - Lipid panel reflex to direct LDL Fasting; Future    Schizoaffective disorder, chronic condition (H)  - Comprehensive metabolic panel (BMP + Alb, Alk Phos, ALT, AST, Total. Bili, TP); Future    Screening for prostate cancer  - PSA, screen; Future    Elevated glucose    - Hemoglobin A1c; Future    Obstructive sleep apnea syndrome  uses bipap-  needs new mask and supplies Adult Sleep Eval & Management  Referral; Future  Patient Ciro Monet received a iContact [employer] BiPap (60 Series) Bilevel. Pressures were set at 21/12 cm H2O.   Patient has been advised of split billing requirements and indicates understanding: Yes    BMI  Estimated body mass index is 57.05 kg/m  as calculated from the following:    Height as of this encounter: 1.854 m (6' 1\").    Weight as of this encounter: 196.1 kg (432 lb 6.4 oz).   Weight management plan: Discussed healthy diet and exercise guidelines    Counseling  Appropriate preventive services were addressed with this patient via screening, questionnaire, or discussion as appropriate for fall prevention, nutrition, physical activity, Tobacco-use cessation, social engagement, weight loss and cognition.  Checklist reviewing preventive services available has been given to the patient.  Reviewed patient's diet, addressing concerns and/or questions.   He is at risk for psychosocial distress and has been provided with information to reduce risk.       Tien Tanner is a 54 year old, presenting for the following:  Physical      Wt Readings from Last 10 Encounters:   09/23/24 (!) 196.1 kg (432 lb 6.4 oz)   11/19/23 (!) 193.2 kg (426 lb)   06/02/23 (!) 192.3 kg (424 lb)   07/13/22 (!) 193.1 kg (425 lb 12.8 oz)   04/06/22 (!) 190.7 kg (420 lb 6.4 oz) "   03/16/22 (!) 195 kg (430 lb)   03/04/22 (!) 190.5 kg (420 lb)   02/17/22 (!) 197.2 kg (434 lb 12 oz)   01/26/22 (!) 193.9 kg (427 lb 8 oz)   09/29/21 (!) 189.1 kg (417 lb)           9/23/2024     1:35 PM   Additional Questions   Roomed by Aaliyah Krause CMA       Health Care Directive  Patient does not have a Health Care Directive or Living Will: Discussed advance care planning with patient; information given to patient to review.    HPI        9/18/2024   General Health   How would you rate your overall physical health? Good   Feel stress (tense, anxious, or unable to sleep) Only a little      (!) STRESS CONCERN      9/18/2024   Nutrition   Three or more servings of calcium each day? Yes   Diet: I don't know   How many servings of fruit and vegetables per day? 4 or more   How many sweetened beverages each day? 0-1            9/18/2024   Exercise   Days per week of moderate/strenous exercise 5 days   Average minutes spent exercising at this level 150+ min            9/18/2024   Social Factors   Frequency of gathering with friends or relatives Three times a week   Worry food won't last until get money to buy more Yes   Food not last or not have enough money for food? Yes   Do you have housing? (Housing is defined as stable permanent housing and does not include staying ouside in a car, in a tent, in an abandoned building, in an overnight shelter, or couch-surfing.) Yes   Are you worried about losing your housing? No   Lack of transportation? No   Unable to get utilities (heat,electricity)? No      (!) FOOD SECURITY CONCERN PRESENT      9/18/2024   Fall Risk   Fallen 2 or more times in the past year? No   Trouble with walking or balance? Yes              9/18/2024   Dental   Dentist two times every year? Yes            9/18/2024   TB Screening   Were you born outside of the [place]? No            Today's PHQ-2 Score:       9/23/2024     1:34 PM   PHQ-2 ( 1999 [employer])   Q1: Little interest or pleasure in doing things 0  "  Q2: Feeling down, depressed or hopeless 0   PHQ-2 Score 0   Q1: Little interest or pleasure in doing things Not at all   Q2: Feeling down, depressed or hopeless Not at all   PHQ-2 Score 0           9/18/2024   Substance Use   Alcohol more than 3/day or more than 7/wk No   Do you use any other substances recreationally? No        Social History     Tobacco Use    Smoking status: Never    Smokeless tobacco: Never   Vaping Use    Vaping status: Never Used   Substance Use Topics    Alcohol use: No     Alcohol/week: 0.0 standard drinks of alcohol    Drug use: No           9/18/2024   STI Screening   New sexual partner(s) since last STI/HIV test? No      ASCVD Risk   The 10-year ASCVD risk score (Jaimie GARCIA, et al., 2019) is: 4.2%    Values used to calculate the score:      Age: 54 years      Sex: Male      Is Non- : No      Diabetic: No      Tobacco smoker: No      Systolic Blood Pressure: 134 mmHg      Is BP treated: No      HDL Cholesterol: 48 mg/dL      Total Cholesterol: 153 mg/dL         Reviewed and updated as needed this visit by Provider                      Review of Systems  Constitutional, HEENT, cardiovascular, pulmonary, gi and gu systems are negative, except as otherwise noted.     Objective    Exam  /82   Pulse 90   Temp 98.4  F (36.9  C) (Tympanic)   Resp 20   Ht 1.854 m (6' 1\")   Wt (!) 196.1 kg (432 lb 6.4 oz)   SpO2 92%   BMI 57.05 kg/m     Estimated body mass index is 57.05 kg/m  as calculated from the following:    Height as of this encounter: 1.854 m (6' 1\").    Weight as of this encounter: 196.1 kg (432 lb 6.4 oz).    Physical Exam  GENERAL: alert and no distress  NECK: no adenopathy, no asymmetry, masses, or scars  RESP: lungs clear to auscultation - no rales, rhonchi or wheezes  CV: regular rate and rhythm, normal S1 S2, no S3 or S4, no murmur, click or rub, no peripheral edema  ABDOMEN: soft, nontender, no hepatosplenomegaly, no masses and bowel " sounds normal  MS: no gross musculoskeletal defects noted, no edema        Signed Electronically by: Keegan Morgan MD

## 2024-09-23 NOTE — PATIENT INSTRUCTIONS
Patient Education   Preventive Care Advice   This is general advice given by our system to help you stay healthy. However, your care team may have specific advice just for you. Please talk to your care team about your preventive care needs.  Nutrition  Eat 5 or more servings of fruits and vegetables each day.  Try wheat bread, brown rice and whole grain pasta (instead of white bread, rice, and pasta).  Get enough calcium and vitamin D. Check the label on foods and aim for 100% of the RDA (recommended daily allowance).  Lifestyle  Exercise at least 150 minutes each week  (30 minutes a day, 5 days a week).  Do muscle strengthening activities 2 days a week. These help control your weight and prevent disease.  No smoking.  Wear sunscreen to prevent skin cancer.  Have a dental exam and cleaning every 6 months.  Yearly exams  See your health care team every year to talk about:  Any changes in your health.  Any medicines your care team has prescribed.  Preventive care, family planning, and ways to prevent chronic diseases.  Shots (vaccines)   HPV shots (up to age 26), if you've never had them before.  Hepatitis B shots (up to age 59), if you've never had them before.  COVID-19 shot: Get this shot when it's due.  Flu shot: Get a flu shot every year.  Tetanus shot: Get a tetanus shot every 10 years.  Pneumococcal, hepatitis A, and RSV shots: Ask your care team if you need these based on your risk.  Shingles shot (for age 50 and up)  General health tests  Diabetes screening:  Starting at age 35, Get screened for diabetes at least every 3 years.  If you are younger than age 35, ask your care team if you should be screened for diabetes.  Cholesterol test: At age 39, start having a cholesterol test every 5 years, or more often if advised.  Bone density scan (DEXA): At age 50, ask your care team if you should have this scan for osteoporosis (brittle bones).  Hepatitis C: Get tested at least once in your life.  STIs (sexually  transmitted infections)  Before age 24: Ask your care team if you should be screened for STIs.  After age 24: Get screened for STIs if you're at risk. You are at risk for STIs (including HIV) if:  You are sexually active with more than one person.  You don't use condoms every time.  You or a partner was diagnosed with a sexually transmitted infection.  If you are at risk for HIV, ask about PrEP medicine to prevent HIV.  Get tested for HIV at least once in your life, whether you are at risk for HIV or not.  Cancer screening tests  Cervical cancer screening: If you have a cervix, begin getting regular cervical cancer screening tests starting at age 21.  Breast cancer scan (mammogram): If you've ever had breasts, begin having regular mammograms starting at age 40. This is a scan to check for breast cancer.  Colon cancer screening: It is important to start screening for colon cancer at age 45.  Have a colonoscopy test every 10 years (or more often if you're at risk) Or, ask your provider about stool tests like a FIT test every year or Cologuard test every 3 years.  To learn more about your testing options, visit:   .  For help making a decision, visit:   https://bit.ly/sw94553.  Prostate cancer screening test: If you have a prostate, ask your care team if a prostate cancer screening test (PSA) at age 55 is right for you.  Lung cancer screening: If you are a current or former smoker ages 50 to 80, ask your care team if ongoing lung cancer screenings are right for you.  For informational purposes only. Not to replace the advice of your health care provider. Copyright   2023 [facility]. All rights reserved. Clinically reviewed by the [facility] Transitions Program. [employer] 907517 - REV 01/24.  Preventing Falls: Care Instructions  Injuries and health problems such as trouble walking or poor eyesight can increase your risk of falling. So can some medicines. But there are things you can do to help  "prevent falls. You can exercise to get stronger. You can also arrange your home to make it safer.    Talk to your doctor about the medicines you take. Ask if any of them increase the risk of falls and whether they can be changed or stopped.   Try to exercise regularly. It can help improve your strength and balance. This can help lower your risk of falling.     Practice fall safety and prevention.    Wear low-heeled shoes that fit well and give your feet good support. Talk to your doctor if you have foot problems that make this hard.  Carry a cellphone or wear a medical alert device that you can use to call for help.  Use stepladders instead of chairs to reach high objects. Don't climb if you're at risk for falls. Ask for help, if needed.  Wear the correct eyeglasses, if you need them.    Make your home safer.    Remove rugs, cords, clutter, and furniture from walkways.  Keep your house well lit. Use night-lights in hallways and bathrooms.  Install and use sturdy handrails on stairways.  Wear nonskid footwear, even inside. Don't walk barefoot or in socks without shoes.    Be safe outside.    Use handrails, curb cuts, and ramps whenever possible.  Keep your hands free by using a shoulder bag or backpack.  Try to walk in well-lit areas. Watch out for uneven ground, changes in pavement, and debris.  Be careful in the winter. Walk on the grass or gravel when sidewalks are slippery. Use de-icer on steps and walkways. Add non-slip devices to shoes.    Put grab bars and nonskid mats in your shower or tub and near the toilet. Try to use a shower chair or bath bench when bathing.   Get into a tub or shower by putting in your weaker leg first. Get out with your strong side first. Have a phone or medical alert device in the bathroom with you.   Where can you learn more?  Go to https://www.[employer].net/patiented  Enter G117 in the search box to learn more about \"Preventing Falls: Care Instructions.\"  Current as of: July 17, " 2023               Content Version: 14.0    6671-5608 [employer].   Care instructions adapted under license by your healthcare professional. If you have questions about a medical condition or this instruction, always ask your healthcare professional. [employer] disclaims any warranty or liability for your use of this information.

## 2025-01-17 NOTE — PATIENT INSTRUCTIONS
Ciro    Thank you for entrusting your care with us at the [facility].      We are prescribing some compression stockings for you. I have included different suppliers that should help you get measured and fitting to ensure proper fitting socks. You should wear these stockings as much as you can. It is especially important to wear them with long periods of standing, sitting, long car rides or if you will be flying. Compression socks should get refilled every 4-6 months. They do not need to be worn at night while in bed. It is recommended to wear compression level of 20-30mmhg or higher from toes to knees.              Varicose Veins    Varicose veins are twisted, enlarged veins near the surface of the skin. They develop most often in the legs and ankles.    Some people may be more likely than others to get varicose veins because of several things. These include aging, pregnancy, being overweight, or because a parent has them. Standing or sitting for long periods of time can also increase risk of varicose veins.    Follow-up care is a key part of your treatment and safety. Be sure to make and go to all appointments, and call your doctor if you are having problems. It's also a good idea to know your test results and keep a list of the medicines you take.      Varicose veins are caused by weakened valves and veins in your legs. Normally, one-way valves in your veins keep blood flowing from your legs up toward your heart. When these valves don't work as they should, blood collects in your legs, and pressure builds up. The veins become weak, large, and twisted.    How can you care for yourself at home?  Wear compression stockings during the day to help relieve symptoms and improve blood flow. Talk to your doctor about which ones to get and where to get them.  Prop up your legs at or above the level of your heart when possible. Try to do this for about 30 minutes at a time, about 3 times a day. This  helps keep the blood from pooling in your lower legs and improves blood flow to the rest of your body.  Avoid sitting and standing for long periods. This puts added stress on your veins.  Stay at a healthy weight. Lose weight if you need to.  Try to take several short walks every day.  Get at least 30 minutes of exercise on most days of the week. Walking is a good choice. You also may want to do other activities, such as running, swimming, cycling, or playing tennis or team sports.  Do calf muscle exercises every day. When you are sitting down, rotate your feet at the ankles in both directions, making small circles. Extend your legs, and point and flex your feet.  Avoid crossing your legs at the knees when sitting.  Take good care of your skin. Treat cuts and scrapes on your legs right away. Keep your legs clean and moisturized to prevent drying and cracking. Prevent sunburns.  Do not smoke. Smoking can make varicose veins worse. If you need help quitting, talk to your doctor about stop-smoking programs and medicines. These can increase your chances of quitting for good.  If you bump your leg so hard that you know it is likely to bruise, prop up your leg and apply ice or cold packs right away. Apply the ice or cold pack for 10 to 20 minutes, 3 or more times a day. Put a thin cloth between the ice and your skin.  If you cut or scratch the skin over a vein, it may bleed a lot. Prop up your leg and apply firm pressure for a full 15 minutes.  If you have a blood clot in a varicose vein, you may have tenderness and swelling over the vein. The vein may feel firm. Be sure to call your doctor right away if you have these symptoms. If your doctor has told you how to care for the clot, follow the instructions.     Care may include the following:    Prop up your leg and apply a damp cloth that is warm or cool.  Ask your doctor if you can take an over-the-counter pain medicine, such as acetaminophen (Tylenol), ibuprofen (Advil,  Motrin), or naproxen (Aleve). Be safe with medicines. Read and follow all instructions on the label.    When should you call for help?     Call 911 anytime you think you may need emergency care. For example, call if:    You have sudden chest pain and shortness of breath, or you cough up blood.    Call your doctor now or seek immediate medical care if:    You have signs of a blood clot in your leg (called a deep vein thrombosis), such as:  Pain in your calf, back of the knee, thigh, or groin.  Swelling in the leg or groin.  A color change on the leg or groin. The skin may be reddish or purplish, depending on your usual skin color.  A varicose vein begins to bleed and you cannot stop it.  You have a tender lump in your leg.  You get an open sore.    Watch closely for changes in your health, and be sure to contact your doctor if:    Your varicose vein symptoms do not improve with home treatment.    Current as of: December 19, 2022  Author: Healthwise Staff  Medical Review:Anurag Esposito MD - Family Medicine & CAROLA Dupont MD - Internal Medicine & Beau Mario MD - Family Medicine & Aron Arellano MD - Family Medicine & Jus Londono MD - Diagnostic Radiology    Please bring your compression prescription to a home medical supply store. Here is a list of locations but not limited to.     [facility]  [address] [place] [address]  Phone: [phone]  Fax: [phone] [facility]  [address] [place] [address]  Phone: [phone]  Fax: [phone]   [facility]  [address] [place] [address]  Phone: [phone]  Fax: [phone] [facility]  [address]  [address] [place] [address]  Phone: [phone]  Fax: [phone]   [facility]  "[place]  [address] [place] [address]  Phone: [phone] [facility]   [address] ([facility])  [place] [address]  Phone: [phone]   [place]  [address] [place] [address]      Phone: [phone]  Fax: [phone] [place]  [address] [place] [address]      Phone: [phone]  Fax: [phone]     [facility] [email or web address]  [address], [place] [address]  [phone]    [facility]  [email or web address]  [address] [place] [address]  Phone: [phone]     [address] [place] [address]  Phone: [phone]    [address] [place] [address]  Phone: [phone]    [address], [place] [address]  Phone: [phone] [address], [place] [address]  Phone: [phone]    [facility] [email or web address]  [address] [place] [address]  Phone: [phone]    [address], [place] [address]  Phone: [phone]    [address], [place] [address]  Phone: [phone]    Darrin Lawrence  [email or web address]  [phone]    Wound care supplies were not ordered or needed today.        Wound Care Instructions    EVERY OTHER DAY and as needed, Cleanse your saline wound(s) with Normal saline and soap and water.    Pat Dry with non-sterile gauze    Apply Lotion to the intact skin surrounding your wound and other dry skin locations. Some good lotions include: Remedy Skin Repair Cream, Sarna, Vanicream or Cetaphil    Primary Dressing: Apply hydrofera blue ready into/onto the wounds    Secondary dressing: Cover with abd    Secure with non-sterile roll gauze (4\" x 75\" roll) and tape (1\" roll tape) as needed; avoid adhesive directly on the skin    Compression: velcro vompression daily    It is ok to get your wound wet in the bath or shower      If for some reason you " are not able to get your dressing(s) changed as outlined above (due to illness, lack of supplies, lack of help) please do the following: remove old, soiled dressings; wash the wounds with saline; pat dry; apply ABD pad or other absorbant pad and secure with rolled gauze; avoid tape directly on your skin; Call the clinic as soon as possible to let us know what the current issues are in receiving wound care [phone].      SEEK MEDICAL CARE IF:  You have an increase in swelling, pain, or redness around the wound.  You have an increase in the amount of pus coming from the wound.  There is a bad smell coming from the wound.  The wound appears to be worsening/enlarging  You have a fever greater than 101.5 F      It is ok to continue current wound care treatment/products for the next 2-3 days until new wound care supplies are ordered and arrive. If longer than this please contact our office at [phone].    If you have a 2 layer or 4 layer compression wrap on these are safe to have on for ONLY 7 days. If for some reason you are not able to get the wrap(s) changed (due to illness; lack of supplies, lack of help, lack of transportation) please do the following: unwrap the old 2 or 4 layer compression wrap; avoid using scissors as you could cut your skin and cause wounds; use tubular compression when available. Call to reschedule your home care or clinic visit appointment as soon as possible.    Please NOTE: if you are 15 minutes late to your arrival time, you will have to be rescheduled. Please call our clinic as soon as possible if you know you will not be able to get to your appointment at [phone]. If you fail to show up to 3 scheduled clinic appointments you will be dismissed from our clinic.              We want to hear from you!  In the next few weeks, you should receive a call or email to complete a survey about your visit at [facility]. Please help us improve your appointment experience by  letting us know how we did today. We strive to make your experience good and value any ways in which we could do better.      We value your input and suggestions.    Thank you for choosing the [facility]!             Pre-Procedure Instructions for Varicose Vein Ablation   We look forward to scheduling a varicose vein procedure for you. First, we will submit a prior authorization to your insurance company if required. This typically takes 10-14 days. We will contact you once we have gotten the approval to schedule the procedure.  The following is helpful information for you regarding your treatment:  **Important: A  will be needed post procedure.  (Unable to use Taxi or Uber).  Please allow 1-2 hours for your vein procedure appointment.  Take your routine medications as you normally would except for blood thinners. Aspirin is ok to continue.  If you take Warfarin, Xarelto, or Eliquis this will need to be HELD prior to procedure according to primary care provider or cardiology who prescribes this medication. Please notify us if you take this medication.  We have thigh high compression stocking sizes medium to X-large that will be applied immediately after the procedure. You will need to provide your own if one of these sizes will not fit. Another option is to bring in knee high compression and biker compression shorts.   Please wear comfortable clothing.  We recommend that you bring a change of undergarment in case it gets stained by the cleansing solution.  Feel free to bring a personal music player or a CD to listen to during your procedure.  It is advised not to fly within 3 weeks after the procedure.  You do not have to fast prior to this procedure.  For any questions regarding your procedure please call [phone] to speak with the nurse.  If you would like a Good Ирина Estimate for your upcoming procedure contact Cost of Care Estimates at [phone], advocates are available  Monday through Friday 8am - 4:30pm.    Radiofrequency Ablation Codes:   - 66458 for first vein in either leg x2  Varicose veins may be a sign of something more severe - venous reflux disease.  Healthy leg veins have valves that keep blood flowing to the heart. Venous reflux disease develops when the valves stop working properly and allow blood to flow backward (i.e., reflux) and pool in the lower leg veins.   If venous reflux disease is left untreated, symptoms can worsen over time. Your doctor can help you understand if you have this condition.     Superficial venous reflux disease may cause the following signs and symptoms in your legs:  Varicose veins  Aching  Swelling  Cramping  Heaviness or tiredness  Itching  Restlessness  Open skin sores    Superficial venous reflux disease treatment aims to reduce or stop the backward flow of blood. The following may be prescribed to treat your superficial venous reflux disease. Your doctor can help you decide which treatment is best for you:   Compression stockings   Removing diseased vein   Closing diseased vein (through thermal or non-thermal treatment)     Radiofrequency Ablation (RFA) Treatment for Varicose Veins  Radiofrequency ablation (RFA) is a thermal procedure to treat varicose veins. It uses heat created from radiofrequency (RF). During RFA treatment, RF heat is sent into your vein through a thin, flexible tube (catheter). This closes off blood flow in the main problem vein.           Getting ready for your treatment  Tell your provider if you:  Are pregnant or think you may be pregnant  Are breastfeeding  Smoke, use alcohol or street drugs on a regular basis  Have any allergies or intolerances to certain medicines. Explain what reaction you have had to these medicines in the past.      The day of your treatment  The treatment takes 45 to 60 minutes. The entire treatment (including time to prepare and recover) takes about 1 to 3 hours. You can go home the same  day. For the treatment:   You'll lie down on a hospital bed.  An imaging method, such as ultrasound, is used to guide the procedure.  The leg to be treated is injected with numbing medicine.  Once your leg is numb, a needle makes a small hole (puncture) in the vein to be treated.  The catheter with the RF heat source is inserted into your vein.  More numbing medicine may be injected around your vein.  Once the catheter is in the right position, it is then slowly drawn backward. As the catheter sends out heat, the vein is closed off.  In some cases, other side branch varicose veins may be removed or tied off through a few small cuts (incisions).  When the treatment is done, the catheter is removed. Pressure is applied to the insertion site to stop any bleeding. An elastic compression stocking or a bandage may then be put on your leg.       Recovering at home  Once at home, follow all the instructions you've been given. Be sure to:  Check for signs of infection at the catheter insertion sites (see below)  Wear thigh high compressions as directed  Keep your legs raised (elevated) as directed  Walk a few times a day  Avoid heavy exercise, lifting, and standing for long periods as advised. No lifting >20lbs for 2 weeks.   Avoid air travel, hot baths, saunas, or whirlpools as advised  Do not drive or operate heavy machinery for 24 hours after the procedure  Leakage from the numbing medications the first few hours is normal.          Call your healthcare provider if you have any of the following:  Fever of 100.4 F (38 C) or higher, or as directed by your provider  Chest pain or trouble breathing  Signs of infection at the catheter insertion site. These include increased redness or swelling (inflammation), warmth, increasing pain, bleeding, or bad-smelling discharge.  Severe numbness or tingling in the treated leg  Severe pain or swelling in the treated leg      Follow-up  You'll have an ultrasound within the same week as  the procedure to check for problems, such as blood clots. You will follow up with the provider after 6 weeks.   Risks and possible complications   These include the following:  Bleeding, Infection, Blood clots, Damage to the nerves in the treated area, Irritation or burning of the skin over the treated vein. Treatment doesn't improve the look or the symptoms of the problem veins  Risks of any medicines used during the treatment

## 2025-01-22 ENCOUNTER — TELEPHONE (OUTPATIENT)
Dept: VASCULAR SURGERY | Facility: CLINIC | Age: 55
End: 2025-01-22
Payer: COMMERCIAL

## 2025-01-22 ENCOUNTER — ANCILLARY PROCEDURE (OUTPATIENT)
Dept: VASCULAR ULTRASOUND | Facility: CLINIC | Age: 55
End: 2025-01-22
Attending: SPECIALIST
Payer: COMMERCIAL

## 2025-01-22 ENCOUNTER — OFFICE VISIT (OUTPATIENT)
Dept: VASCULAR SURGERY | Facility: CLINIC | Age: 55
End: 2025-01-22
Attending: SPECIALIST
Payer: COMMERCIAL

## 2025-01-22 VITALS — OXYGEN SATURATION: 92 % | HEART RATE: 91 BPM | DIASTOLIC BLOOD PRESSURE: 91 MMHG | SYSTOLIC BLOOD PRESSURE: 136 MMHG

## 2025-01-22 DIAGNOSIS — I89.0 SECONDARY LYMPHEDEMA: ICD-10-CM

## 2025-01-22 DIAGNOSIS — L97.311 VENOUS STASIS ULCER OF RIGHT ANKLE LIMITED TO BREAKDOWN OF SKIN WITH VARICOSE VEINS (H): ICD-10-CM

## 2025-01-22 DIAGNOSIS — I83.893 SYMPTOMATIC VARICOSE VEINS OF BOTH LOWER EXTREMITIES: ICD-10-CM

## 2025-01-22 DIAGNOSIS — I83.013 VENOUS STASIS ULCER OF RIGHT ANKLE LIMITED TO BREAKDOWN OF SKIN WITH VARICOSE VEINS (H): ICD-10-CM

## 2025-01-22 DIAGNOSIS — I83.893 SYMPTOMATIC VARICOSE VEINS OF BOTH LOWER EXTREMITIES: Primary | ICD-10-CM

## 2025-01-22 PROCEDURE — 93970 EXTREMITY STUDY: CPT

## 2025-01-22 PROCEDURE — 99214 OFFICE O/P EST MOD 30 MIN: CPT | Performed by: SPECIALIST

## 2025-01-22 ASSESSMENT — PAIN SCALES - GENERAL: PAINLEVEL_OUTOF10: MODERATE PAIN (6)

## 2025-01-22 NOTE — TELEPHONE ENCOUNTER
Vein Prior Authorization Form    Vascular NPI: [id number] Tax ID: [id number]  MSC NPI: [id number] Tax ID: [id number]    Ordering/Performing Physician: Dr. Baljeet Paz NPI: [id number]  Payor: [facility]  Procedure (include vein(s) and laterality): Radiofrequency ablation (RFA) of left GSV and right Perf  CPT: 36475 x2  Diagnosis Code: I83.893 Symptomatic varicose veins of bilateral lower extremities, I87.2 Venous insufficiency , and I83.019, L97.919 Venous stasis ulcer of right lower extremity    Need 2 days for procedure?: No  Other instructions: none

## 2025-01-22 NOTE — PROGRESS NOTES
[facility]        Patient is here for a consult to discuss Varicose vein(s). The patient has varicose veins that are problematic in bilateral legs. Patient states their varicose veins are bothersome when standing, sitting, working, and household chores.     Patient has  been using pain medication or anti-inflammatory's. Patient has not had recent imaging on legs done. Patient has done conservative measures which include: compression stockings, elevation, and exercise. Treatment has been unsuccessful .     Educated patient to work on conservative treatments: compression stockings, elevation, exercise, and weight loss.      Pt is currently taking no meds that would impact our treatment plan.    There were no vitals taken for this visit.    The provider has been notified that the patient has no concerns.     Questions patient would like addressed today are: N/A.    Refills are needed: No    Has homecare services and agency name:  [facility]

## 2025-01-22 NOTE — PROGRESS NOTES
Bilateral symptomatic VV and swelling. Obesity. Uses velcro compression. Arrives with parents.  Bipolar. Right medial ankle wound 2mm.hydrofera blue every other day/velcros 1/22/25 US done and reviewed results. Left GSV and right perf reflux , will preauth  RFA [facility]

## 2025-02-05 ENCOUNTER — OFFICE VISIT (OUTPATIENT)
Dept: FAMILY MEDICINE | Facility: CLINIC | Age: 55
End: 2025-02-05
Payer: COMMERCIAL

## 2025-02-05 VITALS
OXYGEN SATURATION: 93 % | BODY MASS INDEX: 41.75 KG/M2 | RESPIRATION RATE: 16 BRPM | HEART RATE: 83 BPM | HEIGHT: 73 IN | SYSTOLIC BLOOD PRESSURE: 132 MMHG | DIASTOLIC BLOOD PRESSURE: 87 MMHG | TEMPERATURE: 96.9 F | WEIGHT: 315 LBS

## 2025-02-05 DIAGNOSIS — Z86.0100 HISTORY OF COLONIC POLYPS: ICD-10-CM

## 2025-02-05 DIAGNOSIS — R10.32 LLQ ABDOMINAL PAIN: Primary | ICD-10-CM

## 2025-02-05 DIAGNOSIS — K57.30 DIVERTICULOSIS OF LARGE INTESTINE WITHOUT HEMORRHAGE: ICD-10-CM

## 2025-02-05 LAB
ALBUMIN SERPL BCG-MCNC: 4.3 G/DL (ref 3.5–5.2)
ALBUMIN UR-MCNC: NEGATIVE MG/DL
ALP SERPL-CCNC: 100 U/L (ref 40–150)
ALT SERPL W P-5'-P-CCNC: 20 U/L (ref 0–70)
ANION GAP SERPL CALCULATED.3IONS-SCNC: 7 MMOL/L (ref 7–15)
APPEARANCE UR: CLEAR
AST SERPL W P-5'-P-CCNC: 24 U/L (ref 0–45)
BILIRUB SERPL-MCNC: 0.3 MG/DL
BILIRUB UR QL STRIP: NEGATIVE
BUN SERPL-MCNC: 17.3 MG/DL (ref 6–20)
CALCIUM SERPL-MCNC: 9.6 MG/DL (ref 8.8–10.4)
CHLORIDE SERPL-SCNC: 104 MMOL/L (ref 98–107)
COLOR UR AUTO: YELLOW
CREAT SERPL-MCNC: 0.65 MG/DL (ref 0.67–1.17)
EGFRCR SERPLBLD CKD-EPI 2021: >90 ML/MIN/1.73M2
ERYTHROCYTE [DISTWIDTH] IN BLOOD BY AUTOMATED COUNT: 12.8 % (ref 10–15)
GLUCOSE SERPL-MCNC: 86 MG/DL (ref 70–99)
GLUCOSE UR STRIP-MCNC: NEGATIVE MG/DL
HCO3 SERPL-SCNC: 30 MMOL/L (ref 22–29)
HCT VFR BLD AUTO: 43.6 % (ref 40–53)
HGB BLD-MCNC: 14 G/DL (ref 13.3–17.7)
HGB UR QL STRIP: NEGATIVE
KETONES UR STRIP-MCNC: NEGATIVE MG/DL
LEUKOCYTE ESTERASE UR QL STRIP: NEGATIVE
LIPASE SERPL-CCNC: 43 U/L (ref 13–60)
MCH RBC QN AUTO: 29.7 PG (ref 26.5–33)
MCHC RBC AUTO-ENTMCNC: 32.1 G/DL (ref 31.5–36.5)
MCV RBC AUTO: 92 FL (ref 78–100)
NITRATE UR QL: NEGATIVE
PH UR STRIP: 5.5 [PH] (ref 5–7)
PLATELET # BLD AUTO: 212 10E3/UL (ref 150–450)
POTASSIUM SERPL-SCNC: 4.5 MMOL/L (ref 3.4–5.3)
PROT SERPL-MCNC: 7.5 G/DL (ref 6.4–8.3)
RBC # BLD AUTO: 4.72 10E6/UL (ref 4.4–5.9)
SODIUM SERPL-SCNC: 141 MMOL/L (ref 135–145)
SP GR UR STRIP: >=1.03 (ref 1–1.03)
UROBILINOGEN UR STRIP-ACNC: 0.2 E.U./DL
WBC # BLD AUTO: 8.6 10E3/UL (ref 4–11)

## 2025-02-05 PROCEDURE — 83690 ASSAY OF LIPASE: CPT

## 2025-02-05 PROCEDURE — 81003 URINALYSIS AUTO W/O SCOPE: CPT

## 2025-02-05 PROCEDURE — 90472 IMMUNIZATION ADMIN EACH ADD: CPT

## 2025-02-05 PROCEDURE — 80053 COMPREHEN METABOLIC PANEL: CPT

## 2025-02-05 PROCEDURE — 90471 IMMUNIZATION ADMIN: CPT

## 2025-02-05 PROCEDURE — 36415 COLL VENOUS BLD VENIPUNCTURE: CPT

## 2025-02-05 PROCEDURE — 99214 OFFICE O/P EST MOD 30 MIN: CPT | Mod: 25

## 2025-02-05 PROCEDURE — 90715 TDAP VACCINE 7 YRS/> IM: CPT

## 2025-02-05 PROCEDURE — 85027 COMPLETE CBC AUTOMATED: CPT

## 2025-02-05 PROCEDURE — 90677 PCV20 VACCINE IM: CPT

## 2025-02-05 RX ORDER — ARIPIPRAZOLE 2 MG/1
TABLET ORAL
COMMUNITY
Start: 2020-05-05

## 2025-02-05 NOTE — PROGRESS NOTES
Assessment & Plan     Diverticulosis of large intestine without hemorrhage  LLQ abdominal pain  Suspect pt had diverticulitis flare based on symptoms of LLQ pain that lasted 3 days and resolved on its own 2 weeks ago. Could have also been a kidney stones, expansive differential list that is hard to narrow given pt poor history related to mental health and resolution of all symptoms. Pt has no pain with palpation on exam. Does have large retractable nontender hernia. Will check labs today and UA. If pain returns, pt is to followup and we will obtain a CT scan.   - Colonoscopy Screening  Referral  - Comprehensive metabolic panel (BMP + Alb, Alk Phos, ALT, AST, Total. Bili, TP)  - CBC with platelets  - Lipase  - UA Macroscopic with reflex to Microscopic and Culture - Lab Collect  - Comprehensive metabolic panel (BMP + Alb, Alk Phos, ALT, AST, Total. Bili, TP)  - CBC with platelets  - Lipase  - UA Macroscopic with reflex to Microscopic and Culture - Lab Collect    History of colonic polyps  Order placed for colonoscopy as pt will be due this year.       Tien Tanner is a 54 year old, presenting for the following health issues:  Referral (Stomach issues)      2/5/2025     8:41 AM   Additional Questions   Roomed by Shyanne PETTY   Accompanied by jenny     History of Present Illness       Reason for visit:  Referal    He eats 2-3 servings of fruits and vegetables daily.He consumes 1 sweetened beverage(s) daily.He exercises with enough effort to increase his heart rate 60 or more minutes per day.  He exercises with enough effort to increase his heart rate 5 days per week.   He is taking medications regularly.     Had a cancer screening in 10/23/2020, 2/15/2021 and has a tumor on the left side of the stomach.  Has been having trouble with a cough also.    Had some stomach pain lower L lower side a couple weeks, pain lasted 3 days. No fever. Pain completely resolved. No pain at all present today. Pt has hx of  "diverticulosis with diverticulitis flares. Also has large ventral and umbilical hernia that he had repaired and has since reopened.     Father has concerns about renal cyst that pt was seen by oncology for in 2014. Cyst was determined to be benign, and has remained stable with most recent ultrasound in 2020.     Pt has hx of schizoaffective disorder, which limits accuracy of history, but his father was at visit helping provide timelines.         Review of Systems  Constitutional, HEENT, cardiovascular, pulmonary, GI, , musculoskeletal, neuro, skin, endocrine and psych systems are negative, except as otherwise noted.      Objective    /87   Pulse 83   Temp 96.9  F (36.1  C) (Tympanic)   Resp 16   Ht 1.854 m (6' 1\")   Wt (!) 194.3 kg (428 lb 4.8 oz)   SpO2 93%   BMI 56.51 kg/m    Body mass index is 56.51 kg/m .  Physical Exam   GENERAL: alert and no distress  NECK: no adenopathy, no asymmetry, masses, or scars  RESP: lungs clear to auscultation - no rales, rhonchi or wheezes  CV: regular rate and rhythm, normal S1 S2, no S3 or S4, no murmur, click or rub, no peripheral edema  ABDOMEN: soft, nontender, without hepatosplenomegaly or masses, bowel sounds normal, and hernia ventral  MS: no gross musculoskeletal defects noted, no edema  PSYCH: mentation appears normal, affect flat, and judgement and insight impaired          Signed Electronically by: MARIA INES Patten CNP    "

## 2025-02-17 ENCOUNTER — TELEPHONE (OUTPATIENT)
Dept: GASTROENTEROLOGY | Facility: CLINIC | Age: 55
End: 2025-02-17
Payer: COMMERCIAL

## 2025-02-17 NOTE — TELEPHONE ENCOUNTER
"Endoscopy Scheduling Screen    Have you had any respiratory illness or flu-like symptoms in the last 10 days?  No    What is your communication preference for Instructions and/or Bowel Prep?   MyChart    What insurance is in the chart?  Other:  [facility]/[place]     Ordering/Referring Provider:     MAISHA LARSEN       (If ordering provider performs procedure, schedule with ordering provider unless otherwise instructed. )    BMI: Estimated body mass index is 56.51 kg/m  as calculated from the following:    Height as of 2/5/25: 1.854 m (6' 1\").    Weight as of 2/5/25: 194.3 kg (428 lb 4.8 oz).     Sedation Ordered  moderate sedation.   If patient BMI > 50 do not schedule in [facility].    If patient BMI > 45 do not schedule at [facility].    Are you taking methadone or Suboxone?  NO, No RN review required.    Have you been diagnosed and are being treated for severe PTSD or severe anxiety?  NO, No RN review required.    Are you taking any prescription medications for pain 3 or more times per week?   NO, No RN review required.    Do you have a history of malignant hyperthermia?  No    (Females) Are you currently pregnant?   No     Have you been diagnosed or told you have pulmonary hypertension?   No    Do you have an LVAD?  No    Have you been told you have moderate to severe sleep apnea?  Yes. Do you use a CPAP? Yes Where is the patient located?. (RN Review required for scheduling unless scheduling in Hospital.)     Have you been told you have COPD, asthma, or any other lung disease?  No    Do you have any heart conditions?  No     Have you ever had or are you waiting for an organ transplant?  No. Continue scheduling, no site restrictions.    Have you had a stroke or transient ischemic attack (TIA aka \"mini stroke\" in the last 6 months?   No    Have you been diagnosed with or been told you have cirrhosis of the liver?   No.    Are you currently on dialysis?   No    Do you need assistance transferring?   No    BMI: Estimated body mass index is " "56.51 kg/m  as calculated from the following:    Height as of 2/5/25: 1.854 m (6' 1\").    Weight as of 2/5/25: 194.3 kg (428 lb 4.8 oz).     Is patients BMI > 40 and scheduling location UP?  No    Do you take an injectable or oral medication for weight loss or diabetes (excluding insulin)?  No    Do you take the medication Naltrexone?  No    Do you take blood thinners?  No       Prep   Are you currently on dialysis or do you have chronic kidney disease?  No    Do you have a diagnosis of diabetes?  Yes (Golytely Prep) - Prediabetic     Do you have a diagnosis of cystic fibrosis (CF)?  No    On a regular basis do you go 3 -5 days between bowel movements?  No    BMI > 40?  No    Preferred Pharmacy:      [facility] [address] [place] [address]  [address] [place] [id number]  Phone: [phone] Fax: [phone]      Final Scheduling Details     Procedure scheduled  Colonoscopy    Surgeon:  Flako      Date of procedure:  02/25     Pre-OP / PAC:   No - Not required for this site.    Location    per [facility] transfer     Sedation   MAC/Deep Sedation  Per site       Patient Reminders:   You will receive a call from a Nurse to review instructions and health history.  This assessment must be completed prior to your procedure.  Failure to complete the Nurse assessment may result in the procedure being cancelled.      On the day of your procedure, please designate an adult(s) who can drive you home stay with you for the next 24 hours. The medicines used in the exam will make you sleepy. You will not be able to drive.      You cannot take public transportation, ride share services, or non-medical taxi service without a responsible caregiver.  Medical transport services are allowed with the requirement that a responsible caregiver will receive you at your destination.  We require that drivers and caregivers are confirmed prior to your procedure.  "

## 2025-02-18 ENCOUNTER — TELEPHONE (OUTPATIENT)
Dept: GASTROENTEROLOGY | Facility: CLINIC | Age: 55
End: 2025-02-18
Payer: COMMERCIAL

## 2025-02-18 DIAGNOSIS — Z12.11 SPECIAL SCREENING FOR MALIGNANT NEOPLASMS, COLON: Primary | ICD-10-CM

## 2025-02-18 RX ORDER — BISACODYL 5 MG/1
TABLET, DELAYED RELEASE ORAL
Qty: 4 TABLET | Refills: 0 | Status: SHIPPED | OUTPATIENT
Start: 2025-02-18

## 2025-02-18 NOTE — TELEPHONE ENCOUNTER
Pre assessment completed for upcoming procedure.   (Please see previous telephone encounter notes for complete details)    Procedure details:    Arrival time and facility location reviewed.    Pre op exam needed? No.    Designated  policy reviewed. Instructed to have someone stay 24  hours post procedure.       Medication review:    Medications reviewed. Please see supporting documentation below. Holding recommendations discussed (if applicable).   Metformin- hold day of procedure  Patient denies GLP-1    Prep for procedure:     Procedure prep instructions reviewed.        Any additional information needed:  N/A      Patient verbalized understanding and had no questions or concerns at this time.      Aric Bauer RN  Endoscopy Procedure Pre Assessment   [phone] option 3

## 2025-02-18 NOTE — TELEPHONE ENCOUNTER
BMI 56.  Mervin Vasques, Kenzie Vazquez CRNA, ENZO; P [facility]  Pt should be fine to be done at [facility].  Juarez              Pre visit planning completed.      Procedure details:    Patient scheduled for Colonoscopy on 2.25.25.     Arrival time: 1200. Procedure time 1300    Facility location: [facility]; [address] [place] [address] , [place] [address]. Check in location: Main entrance at Surgery registation desk.    Sedation type: MAC    Pre op exam needed? No.    Indication for procedure: screening      Chart review:     Electronic implanted devices? No    Recent diagnosis of diverticulitis within the last 6 weeks? No      Medication review:    Diabetic? Prediabetic - Metformin, states patient reported on medication record.    Anticoagulants? No    Weight loss medication/injectable? No GLP-1 medication per patient's medication list. Nursing to verify with pre-assessment call.    Other medication HOLDING recommendations:  N/A      Prep for procedure:     Bowel prep recommendation: Extended Golytely. Bowel prep sent to      [facility] [address] [place] [address] - [address]  Due to: BMI > 40    Procedure information and instructions sent via [employer]         Kenzie Wei, ENZO  Endoscopy Procedure Pre Assessment   [phone] option 3

## 2025-02-23 ENCOUNTER — ANESTHESIA EVENT (OUTPATIENT)
Dept: GASTROENTEROLOGY | Facility: CLINIC | Age: 55
End: 2025-02-23
Payer: COMMERCIAL

## 2025-02-24 NOTE — H&P
[facility] Anesthesia Pre-op History and Physical    Ciro Monet MRN# [id number]   Age: 54 year old YOB: 1970      Date of Surgery: 2/25/2025 Location [facility]      Date of Exam 2/25/2025 Facility (In hospital)       Home clinic: [facility]  Primary care provider: Keegan Morgan         Chief Complaint and/or Reason for Procedure:   No chief complaint on file.  Colonoscopy  Exam 2020 adenoma       Active problem list:     Patient Active Problem List    Diagnosis Date Noted    Nontraumatic complete tear of right rotator cuff 02/17/2022     Priority: Medium    Schizoaffective disorder, chronic condition (H) 12/11/2017     Priority: Medium    Incarcerated hernia 10/29/2016     Priority: Medium    Umbilical hernia, incarcerated 10/29/2016     Priority: Medium    Renal cyst needs ct 6/15 11/04/2014     Priority: Medium    Localized superficial swelling, mass, or lump 06/19/2014     Priority: Medium    Morbid obesity (H) 06/30/2011     Priority: Medium    HYPERLIPIDEMIA LDL GOAL <160 10/31/2010     Priority: Medium    CHARITY (Obstructive Sleep Apnea)-severe () 02/09/2010     Priority: Medium    Esophageal reflux 03/05/2007     Priority: Medium    Moderate depressed bipolar I disorder (H) 05/08/2006     Priority: Medium     Problem list name updated by automated process. Provider to review              Medications (include herbals and vitamins):   Any Plavix use in the last 7 days? No     Current Facility-Administered Medications   Medication Dose Route Frequency Provider Last Rate Last Admin    lidocaine 112 mL, EPINEPHrine (ADRENALIN) 1.12 mg in sodium chloride 0.9 % 1,113.12 mL (TUMESCENT)   Irrigation Once Baljeet Paz MD         Current Outpatient Medications   Medication Sig Dispense Refill    ARIPiprazole (ABILIFY) 2 MG tablet       bisacodyl (DULCOLAX) 5 MG EC tablet Two days prior to exam take two (2) tablets at 4pm. One day  prior to exam take two (2) tablets at 4pm 4 tablet 0    Cholecalciferol (VITAMIN D-3) 25 MCG (1000 UT) CAPS Take 2,000 Units by mouth      co-enzyme Q-10 100 MG CAPS capsule Take 200 mg by mouth daily      furosemide (LASIX) 20 MG tablet Take 1 tablet (20 mg) by mouth daily 90 tablet 1    INVEGA SUSTENNA 234 MG/1.5ML PIETER Inject 234 mg into the muscle every 28 days       metFORMIN (GLUCOPHAGE-XR) 500 MG 24 hr tablet Take 2,000 mg by mouth daily (with breakfast) Takes four tablets in am  3    order for DME Equipment being ordered: BIPAP  Patient Ciro Monet received a Battlepro BiPap (60 Series) Bilevel. Pressures were set at 21/12 cm H2O.      polyethylene glycol (GOLYTELY) 236 g suspension Two days before procedure at 5PM fill first container with water. Mix and drink an 8 oz glass every 15 minutes until HALF of the container is gone. Place the remainder in the refrigerator. One day before procedure at 5PM drink second half of bowel prep. Drink an 8 oz glass every 15 minutes until it is gone. Day of procedure 6 hours before arrival time fill the 2nd container with water. Mix and drink an 8 oz glass every 15 minutes until HALF of the container is gone. Discard the remaining solution. 8000 mL 0    Turmeric Curcumin 500 MG CAPS                Allergies:      Allergies   Allergen Reactions    Adhesive Tape Rash     3M Plastic adhesive transpore tape per dad. Blisters and rash.    Pine Swelling and Difficulty breathing     Eyes swelling & difficultly breathing.     Allergy to Latex? No  Allergy to tape?   No  Intolerances:             Physical Exam:   All vitals have been reviewed  No data found.  No intake/output data recorded.  Lungs:   No increased work of breathing, good air exchange, clear to auscultation bilaterally, no crackles or wheezing     Cardiovascular:   Normal apical impulse, regular rate and rhythm, normal S1 and S2, no S3 or S4, and no murmur noted             Lab / Radiology  Results:            Anesthetic risk and/or ASA classification:       Renzo Arriola MD

## 2025-02-25 ENCOUNTER — ANESTHESIA (OUTPATIENT)
Dept: GASTROENTEROLOGY | Facility: CLINIC | Age: 55
End: 2025-02-25
Payer: COMMERCIAL

## 2025-02-25 ENCOUNTER — HOSPITAL ENCOUNTER (OUTPATIENT)
Facility: CLINIC | Age: 55
Discharge: HOME | End: 2025-02-25
Attending: INTERNAL MEDICINE | Admitting: INTERNAL MEDICINE
Payer: COMMERCIAL

## 2025-02-25 VITALS
HEART RATE: 77 BPM | SYSTOLIC BLOOD PRESSURE: 122 MMHG | OXYGEN SATURATION: 90 % | TEMPERATURE: 97.9 F | BODY MASS INDEX: 56.51 KG/M2 | DIASTOLIC BLOOD PRESSURE: 86 MMHG | WEIGHT: 315 LBS | RESPIRATION RATE: 16 BRPM

## 2025-02-25 LAB
COLONOSCOPY: NORMAL
GLUCOSE BLDC GLUCOMTR-MCNC: 89 MG/DL (ref 70–99)

## 2025-02-25 PROCEDURE — 258N000003 HC RX IP 258 OP 636: Performed by: NURSE ANESTHETIST, CERTIFIED REGISTERED

## 2025-02-25 PROCEDURE — 45378 DIAGNOSTIC COLONOSCOPY: CPT | Performed by: INTERNAL MEDICINE

## 2025-02-25 PROCEDURE — 82962 GLUCOSE BLOOD TEST: CPT

## 2025-02-25 PROCEDURE — G0121 COLON CA SCRN NOT HI RSK IND: HCPCS | Performed by: INTERNAL MEDICINE

## 2025-02-25 PROCEDURE — 370N000017 HC ANESTHESIA TECHNICAL FEE, PER MIN: Performed by: INTERNAL MEDICINE

## 2025-02-25 PROCEDURE — 250N000009 HC RX 250: Performed by: NURSE ANESTHETIST, CERTIFIED REGISTERED

## 2025-02-25 PROCEDURE — 250N000011 HC RX IP 250 OP 636: Performed by: NURSE ANESTHETIST, CERTIFIED REGISTERED

## 2025-02-25 RX ORDER — LIDOCAINE 40 MG/G
CREAM TOPICAL
Status: DISCONTINUED | OUTPATIENT
Start: 2025-02-25 | End: 2025-02-25 | Stop reason: HOSPADM

## 2025-02-25 RX ORDER — LIDOCAINE HYDROCHLORIDE 10 MG/ML
INJECTION, SOLUTION INFILTRATION; PERINEURAL PRN
Status: DISCONTINUED | OUTPATIENT
Start: 2025-02-25 | End: 2025-02-25

## 2025-02-25 RX ORDER — SODIUM CHLORIDE, SODIUM LACTATE, POTASSIUM CHLORIDE, CALCIUM CHLORIDE 600; 310; 30; 20 MG/100ML; MG/100ML; MG/100ML; MG/100ML
INJECTION, SOLUTION INTRAVENOUS CONTINUOUS
Status: DISCONTINUED | OUTPATIENT
Start: 2025-02-25 | End: 2025-02-25 | Stop reason: HOSPADM

## 2025-02-25 RX ORDER — PROPOFOL 10 MG/ML
INJECTION, EMULSION INTRAVENOUS PRN
Status: DISCONTINUED | OUTPATIENT
Start: 2025-02-25 | End: 2025-02-25

## 2025-02-25 RX ORDER — PROPOFOL 10 MG/ML
INJECTION, EMULSION INTRAVENOUS CONTINUOUS PRN
Status: DISCONTINUED | OUTPATIENT
Start: 2025-02-25 | End: 2025-02-25

## 2025-02-25 RX ADMIN — PROPOFOL 200 MCG/KG/MIN: 10 INJECTION, EMULSION INTRAVENOUS at 13:20

## 2025-02-25 RX ADMIN — PROPOFOL 100 MG: 10 INJECTION, EMULSION INTRAVENOUS at 13:19

## 2025-02-25 RX ADMIN — SODIUM CHLORIDE, POTASSIUM CHLORIDE, SODIUM LACTATE AND CALCIUM CHLORIDE: 600; 310; 30; 20 INJECTION, SOLUTION INTRAVENOUS at 13:19

## 2025-02-25 RX ADMIN — LIDOCAINE HYDROCHLORIDE 30 MG: 10 INJECTION, SOLUTION INFILTRATION; PERINEURAL at 13:19

## 2025-02-25 ASSESSMENT — ACTIVITIES OF DAILY LIVING (ADL)
ADLS_ACUITY_SCORE: 44

## 2025-02-25 NOTE — ANESTHESIA POSTPROCEDURE EVALUATION
Patient: Ciro Monet    Procedure: Procedure(s):  Incomplete Colonoscopy       Anesthesia Type:  MAC    Note:  Disposition: Outpatient   Postop Pain Control: Uneventful            Sign Out: Well controlled pain   PONV: No   Neuro/Psych: Uneventful            Sign Out: Acceptable/Baseline neuro status   Airway/Respiratory: Uneventful            Sign Out: Acceptable/Baseline resp. status   CV/Hemodynamics: Uneventful            Sign Out: Acceptable CV status   Other NRE: NONE   DID A NON-ROUTINE EVENT OCCUR? No    Event details/Postop Comments:  Pt was happy with anesthesia care.  No complications.  I will follow up with the pt if needed.       Last vitals:  Vitals Value Taken Time   /81 02/25/25 1400   Temp     Pulse 80 02/25/25 1400   Resp     SpO2 91 % 02/25/25 1410   Vitals shown include unfiled device data.    Electronically Signed By: MARIA INES Maharaj CRNA  February 25, 2025  2:11 PM

## 2025-02-25 NOTE — ANESTHESIA PREPROCEDURE EVALUATION
Anesthesia Pre-Procedure Evaluation    Patient: Ciro Monet   MRN: [id number] : 1970        Procedure : Procedure(s):  Colonoscopy          Past Medical History:   Diagnosis Date    Hiatal hernia     Morbid obesity with BMI of 45.0-49.9, adult (H)     CHARITY (obstructive sleep apnea) 2010      CPAP 15      Past Surgical History:   Procedure Laterality Date    ARTHROSCOPY SHOULDER, OPEN ROTATOR CUFF REPAIR, COMBINED Right 2022    Procedure: Right Shoulder Arthroscopy , Intra articular Debridement, Subacromial Decompression, Biceps Tenodesis, Rotator Cuff Repair;  Surgeon: Stevan Nieves MD;  Location: [facility] OR     TOOTH EXTRACTION W/FORCEP      HEMORRHOID INJECTION SCLEROSING SOLUTION      HERNIORRHAPHY UMBILICAL N/A 10/29/2016    Procedure: HERNIORRHAPHY UMBILICAL;  Surgeon: Lori Barron MD;  Location: [facility]    [facility]  ??    ZZHC REPAIR EPIGASTRIC HERNIA,REDUC  07      Allergies   Allergen Reactions    Adhesive Tape Rash     3M Plastic adhesive transpore tape per dad. Blisters and rash.    Pine Swelling and Difficulty breathing     Eyes swelling & difficultly breathing.      Social History     Tobacco Use    Smoking status: Never    Smokeless tobacco: Never   Substance Use Topics    Alcohol use: No     Alcohol/week: 0.0 standard drinks of alcohol      Wt Readings from Last 1 Encounters:   25 (!) 194.3 kg (428 lb 4.8 oz)        Anesthesia Evaluation   Pt has had prior anesthetic. Type: General and MAC.        ROS/MED HX  ENT/Pulmonary:     (+) sleep apnea,                                    (-) CHARITY risk factors   Neurologic:       Cardiovascular:     (+) Dyslipidemia - -   -  - -                                 Previous cardiac testing   Echo: Date: Results:    Stress Test:  Date: Results:    ECG Reviewed:  Date: 2022 Results:  SR  Cath:  Date: Results:      METS/Exercise Tolerance:     Hematologic:  - neg hematologic  ROS     Musculoskeletal:   (+)  arthritis,              GI/Hepatic: Comment: Hiatal hernia    Diverticulitis    LLQ pain    (+) GERD,     hiatal hernia,  bowel prep,            Renal/Genitourinary: Comment: Renal cyst       Endo: Comment: Morbid obesity    (+)               Obesity,       Psychiatric/Substance Use: Comment: Schizoaffective disorder, chronic condition    (+) psychiatric history depression       Infectious Disease:       Malignancy:  - neg malignancy ROS     Other:            Physical Exam    Airway  airway exam normal      Mallampati: II   TM distance: > 3 FB   Neck ROM: full   Mouth opening: > 3 cm    Respiratory Devices and Support         Dental  no notable dental history         Cardiovascular   cardiovascular exam normal       Rhythm and rate: regular and normal     Pulmonary   pulmonary exam normal        breath sounds clear to auscultation         OUTSIDE LABS:  CBC:   Lab Results   Component Value Date    WBC 8.6 02/05/2025    WBC 6.5 05/26/2023    HGB 14.0 02/05/2025    HGB 13.6 05/26/2023    HCT 43.6 02/05/2025    HCT 41.9 05/26/2023     02/05/2025     05/26/2023     BMP:   Lab Results   Component Value Date     02/05/2025     09/23/2024    POTASSIUM 4.5 02/05/2025    POTASSIUM 4.0 09/23/2024    CHLORIDE 104 02/05/2025    CHLORIDE 100 09/23/2024    CO2 30 (H) 02/05/2025    CO2 26 09/23/2024    BUN 17.3 02/05/2025    BUN 11.8 09/23/2024    CR 0.65 (L) 02/05/2025    CR 0.71 09/23/2024    GLC 86 02/05/2025     (H) 09/23/2024     COAGS:   Lab Results   Component Value Date    INR 0.97 02/17/2022     POC:   Lab Results   Component Value Date    BGM 78 11/03/2016     HEPATIC:   Lab Results   Component Value Date    ALBUMIN 4.3 02/05/2025    PROTTOTAL 7.5 02/05/2025    ALT 20 02/05/2025    AST 24 02/05/2025    ALKPHOS 100 02/05/2025    BILITOTAL 0.3 02/05/2025     OTHER:   Lab Results   Component Value Date    A1C 5.9 (H) 09/23/2024    MAURY 9.6 02/05/2025    MAG 2.2 10/30/2016    LIPASE 43 02/05/2025    TSH 1.84  "06/02/2023    T4 0.91 03/11/2005       Anesthesia Plan    ASA Status:  3    NPO Status:  NPO Appropriate    Anesthesia Type: MAC.     - Reason for MAC: straight local not clinically adequate   Induction: Intravenous, Propofol.   Maintenance: TIVA.        Consents    Anesthesia Plan(s) and associated risks, benefits, and realistic alternatives discussed. Questions answered and patient/representative(s) expressed understanding.     - Discussed:     - Discussed with:  Patient      - Extended Intubation/Ventilatory Support Discussed: No.      - Patient is DNR/DNI Status: No     Use of blood products discussed: No .     Postoperative Care    Pain management: Oral pain medications.   PONV prophylaxis: Background Propofol Infusion     Comments:    Other Comments: The risks and benefits of anesthesia, and the alternatives where applicable, have been discussed with the patient, and they wish to proceed.              MARIA INES Maharaj CRNA    I have reviewed the pertinent notes and labs in the chart from the past 30 days and (re)examined the patient.  Any updates or changes from those notes are reflected in this note.    Clinically Significant Risk Factors Present on Admission                             # Severe Obesity: Estimated body mass index is 56.51 kg/m  as calculated from the following:    Height as of 2/5/25: 1.854 m (6' 1\").    Weight as of 2/5/25: 194.3 kg (428 lb 4.8 oz).                "

## 2025-02-25 NOTE — ANESTHESIA CARE TRANSFER NOTE
Patient: Ciro Monet    Procedure: Procedure(s):  Incomplete Colonoscopy       Diagnosis: Diverticulosis of large intestine without hemorrhage [K57.30]  Diagnosis Additional Information: No value filed.    Anesthesia Type:   MAC     Note:    Oropharynx: oropharynx clear of all foreign objects and spontaneously breathing  Level of Consciousness: drowsy  Oxygen Supplementation: face mask    Independent Airway: airway patency satisfactory and stable  Dentition: dentition unchanged  Vital Signs Stable: post-procedure vital signs reviewed and stable  Report to RN Given: handoff report given  Patient transferred to: Phase II    Handoff Report: Identifed the Patient, Identified the Reponsible Provider, Reviewed the pertinent medical history, Discussed the surgical course, Reviewed Intra-OP anesthesia mangement and issues during anesthesia, Set expectations for post-procedure period and Allowed opportunity for questions and acknowledgement of understanding  Vitals:  Vitals Value Taken Time   BP     Temp     Pulse     Resp     SpO2         Electronically Signed By: MARIA INES Maharaj CRNA  February 25, 2025  1:52 PM

## 2025-02-25 NOTE — DISCHARGE INSTRUCTIONS
[facility]    Home Care Following Endoscopy          Activity:  You have just undergone an endoscopic procedure usually performed with conscious sedation.  Do not work or operate machinery (including a car) for at least 12 hours.    I encourage you to walk and attempt to pass this air as soon as possible.    Diet:  Return to the diet you were on before your procedure but eat lightly for the first 12-24 hours.  Drink plenty of water.  Resume any regular medications unless otherwise advised by your physician.  Please begin any new medication prescribed as a result of your procedure as directed by your physician.   If you had any biopsy or polyp removed please refrain from aspirin or aspirin products for 2 days.  If on Coumadin please restart as instructed by your physician.   Pain:  You may take Tylenol as needed for pain.  Expected Recovery:  You can expect some mild abdominal fullness and/or discomfort due to the air used to inflate your intestinal tract. It is also normal to have a mild sore throat after upper endoscopy.    Call Your Physician if You Have:  After Colonoscopy:  Worsening persisting abdominal pain which is worse with activity.  Fevers (>101 degrees F), chills or shakes.  Passage of continued blood with bowel movements.     Any questions or concerns about your recovery, please call [phone] or after hours [phone] ([phone]) Nurse Advice Line.

## 2025-03-01 DIAGNOSIS — K43.9 VENTRAL HERNIA WITHOUT OBSTRUCTION OR GANGRENE: Primary | ICD-10-CM

## 2025-04-07 ENCOUNTER — OFFICE VISIT (OUTPATIENT)
Dept: FAMILY MEDICINE | Facility: CLINIC | Age: 55
End: 2025-04-07
Payer: COMMERCIAL

## 2025-04-07 VITALS
OXYGEN SATURATION: 92 % | SYSTOLIC BLOOD PRESSURE: 124 MMHG | WEIGHT: 315 LBS | RESPIRATION RATE: 18 BRPM | HEIGHT: 73 IN | TEMPERATURE: 99 F | DIASTOLIC BLOOD PRESSURE: 84 MMHG | BODY MASS INDEX: 41.75 KG/M2 | HEART RATE: 90 BPM

## 2025-04-07 DIAGNOSIS — Z12.5 SCREENING FOR PROSTATE CANCER: ICD-10-CM

## 2025-04-07 DIAGNOSIS — I83.215: Primary | ICD-10-CM

## 2025-04-07 DIAGNOSIS — L97.518: Primary | ICD-10-CM

## 2025-04-07 DIAGNOSIS — I83.12 VARICOSE VEINS OF BOTH LOWER EXTREMITIES WITH INFLAMMATION: ICD-10-CM

## 2025-04-07 DIAGNOSIS — E66.01 MORBID OBESITY (H): ICD-10-CM

## 2025-04-07 DIAGNOSIS — F25.9 SCHIZOAFFECTIVE DISORDER, CHRONIC CONDITION (H): ICD-10-CM

## 2025-04-07 DIAGNOSIS — G47.33 OSA (OBSTRUCTIVE SLEEP APNEA): ICD-10-CM

## 2025-04-07 DIAGNOSIS — R73.09 ELEVATED GLUCOSE: ICD-10-CM

## 2025-04-07 DIAGNOSIS — R97.20 ELEVATED PROSTATE SPECIFIC ANTIGEN (PSA): ICD-10-CM

## 2025-04-07 DIAGNOSIS — I83.11 VARICOSE VEINS OF BOTH LOWER EXTREMITIES WITH INFLAMMATION: ICD-10-CM

## 2025-04-07 LAB
ALBUMIN SERPL BCG-MCNC: 4.3 G/DL (ref 3.5–5.2)
ALP SERPL-CCNC: 105 U/L (ref 40–150)
ALT SERPL W P-5'-P-CCNC: 27 U/L (ref 0–70)
ANION GAP SERPL CALCULATED.3IONS-SCNC: 12 MMOL/L (ref 7–15)
AST SERPL W P-5'-P-CCNC: 27 U/L (ref 0–45)
BILIRUB SERPL-MCNC: 0.3 MG/DL
BUN SERPL-MCNC: 18.1 MG/DL (ref 6–20)
CALCIUM SERPL-MCNC: 9.4 MG/DL (ref 8.8–10.4)
CHLORIDE SERPL-SCNC: 98 MMOL/L (ref 98–107)
CREAT SERPL-MCNC: 0.87 MG/DL (ref 0.67–1.17)
EGFRCR SERPLBLD CKD-EPI 2021: >90 ML/MIN/1.73M2
ERYTHROCYTE [DISTWIDTH] IN BLOOD BY AUTOMATED COUNT: 12.8 % (ref 10–15)
EST. AVERAGE GLUCOSE BLD GHB EST-MCNC: 114 MG/DL
GLUCOSE SERPL-MCNC: 107 MG/DL (ref 70–99)
HBA1C MFR BLD: 5.6 % (ref 0–5.6)
HCO3 SERPL-SCNC: 27 MMOL/L (ref 22–29)
HCT VFR BLD AUTO: 46 % (ref 40–53)
HGB BLD-MCNC: 14.9 G/DL (ref 13.3–17.7)
MCH RBC QN AUTO: 29.2 PG (ref 26.5–33)
MCHC RBC AUTO-ENTMCNC: 32.4 G/DL (ref 31.5–36.5)
MCV RBC AUTO: 90 FL (ref 78–100)
PLATELET # BLD AUTO: 219 10E3/UL (ref 150–450)
POTASSIUM SERPL-SCNC: 4.3 MMOL/L (ref 3.4–5.3)
PROT SERPL-MCNC: 7.4 G/DL (ref 6.4–8.3)
PSA SERPL DL<=0.01 NG/ML-MCNC: 4.58 NG/ML (ref 0–3.5)
RBC # BLD AUTO: 5.1 10E6/UL (ref 4.4–5.9)
SODIUM SERPL-SCNC: 137 MMOL/L (ref 135–145)
WBC # BLD AUTO: 8.4 10E3/UL (ref 4–11)

## 2025-04-07 PROCEDURE — 80053 COMPREHEN METABOLIC PANEL: CPT | Performed by: FAMILY MEDICINE

## 2025-04-07 PROCEDURE — 99214 OFFICE O/P EST MOD 30 MIN: CPT | Performed by: FAMILY MEDICINE

## 2025-04-07 PROCEDURE — 85027 COMPLETE CBC AUTOMATED: CPT | Performed by: FAMILY MEDICINE

## 2025-04-07 PROCEDURE — 3074F SYST BP LT 130 MM HG: CPT | Performed by: FAMILY MEDICINE

## 2025-04-07 PROCEDURE — 36415 COLL VENOUS BLD VENIPUNCTURE: CPT | Performed by: FAMILY MEDICINE

## 2025-04-07 PROCEDURE — 93000 ELECTROCARDIOGRAM COMPLETE: CPT | Performed by: FAMILY MEDICINE

## 2025-04-07 PROCEDURE — 1126F AMNT PAIN NOTED NONE PRSNT: CPT | Performed by: FAMILY MEDICINE

## 2025-04-07 PROCEDURE — 3079F DIAST BP 80-89 MM HG: CPT | Performed by: FAMILY MEDICINE

## 2025-04-07 PROCEDURE — 83036 HEMOGLOBIN GLYCOSYLATED A1C: CPT | Performed by: FAMILY MEDICINE

## 2025-04-07 PROCEDURE — G0103 PSA SCREENING: HCPCS | Performed by: FAMILY MEDICINE

## 2025-04-07 ASSESSMENT — PAIN SCALES - GENERAL: PAINLEVEL_OUTOF10: NO PAIN (0)

## 2025-04-07 NOTE — PROGRESS NOTES
"  Assessment & Plan     Varicose veins of right lower extremity with inflammation, with ulcer of other part of foot of other severity (H)    - CBC with platelets; Future  - Comprehensive metabolic panel (BMP + Alb, Alk Phos, ALT, AST, Total. Bili, TP); Future  - CBC with platelets  - Comprehensive metabolic panel (BMP + Alb, Alk Phos, ALT, AST, Total. Bili, TP)    Morbid obesity (H)  Good control.  Continue currant medications, continue to monitor.    - EKG 12-lead complete w/read - Clinics    CHARITY (Obstructive Sleep Apnea)-severe ()  To bring cpap with him    Schizoaffective disorder, chronic condition (H)  He lost his job.  The diversity  at [facility] [employer] who woould herlp him when he hqad emotional outburst was let go , and then he had outburst and was fired.     Varicose veins of both lower extremities with inflammation  Okay for procedure    Elevated glucose    - Hemoglobin A1c; Future  - Hemoglobin A1c    Screening for prostate cancer  - PSA, screen          BMI  Estimated body mass index is 56.6 kg/m  as calculated from the following:    Height as of this encounter: 1.854 m (6' 1\").    Weight as of this encounter: 194.6 kg (429 lb).   Weight management plan: Patient referred to endocrine and/or weight management specialty Discussed healthy diet and exercise guidelines          Tien Tanner is a 54 year old, presenting for the following health issues:  No chief complaint on file.    History of Present Illness       Reason for visit:  Pre op physical for leg vein surgery    He eats 2-3 servings of fruits and vegetables daily.He consumes 0 sweetened beverage(s) daily.He exercises with enough effort to increase his heart rate 10 to 19 minutes per day.  He exercises with enough effort to increase his heart rate 5 days per week.   He is taking medications regularly.      Leg vein surgery  Date of surgery 04/20/27  Where : [facility] [place] [place]   Surgeon -  " Jackson    Pre-Operative Questionnaire  1. No - Have you ever had a heart attack or stroke?  2. No - Have you ever had surgery on your heart or blood vessels, such as a stent, coronary (heart) bypass, or surgery on an artery in the head, neck, heart or legs?  3. No - Do you have chest pain when you're physically active?  4. No - Do you have a history of heart failure?  5. No - Do you currently have a cold, bronchitis, or symptoms of other respiratory (head and chest) infections?  6. No - Do you have a cough, shortness of breath, or wheezing?  7. No - Do you or anyone in your family have a history of blood clots?  8. No - Do you or anyone in your family have a serious bleeding problem, such as long-lasting bleeding after surgeries or cuts?  9. No - Have you ever had anemia or been told to take iron pills?  10. No - Have you had any abnormal blood loss such as black, tarry or bloody stools, or abnormal vaginal bleeding?  11. No - Have you ever had a blood transfusion?  12. Yes - Are you willing to have a blood transfusion if it is medically needed before, during or after your surgery?  13. No - Have you or anyone in your family ever had problems with anesthesia (sedation for surgery)?  14 yes - Do you have sleep apnea, excessive snoring or daytime drowsiness?  15. yes - Do you have a CPAP machine?  16. No - Do you have any artificial joints?  17. No - Are you allergic to latex?  18. No - Is there any chance that you may be pregnant?                    Objective    There were no vitals taken for this visit.  There is no height or weight on file to calculate BMI.  Physical Exam               Signed Electronically by: Keegan Morgan MD

## 2025-04-10 ENCOUNTER — MYC MEDICAL ADVICE (OUTPATIENT)
Dept: VASCULAR SURGERY | Facility: CLINIC | Age: 55
End: 2025-04-10
Payer: MEDICAID

## 2025-04-17 DIAGNOSIS — I89.0 LYMPHEDEMA OF BOTH LOWER EXTREMITIES: ICD-10-CM

## 2025-04-17 RX ORDER — FUROSEMIDE 20 MG/1
20 TABLET ORAL DAILY
Qty: 90 TABLET | Refills: 0 | Status: SHIPPED | OUTPATIENT
Start: 2025-04-17

## 2025-06-10 ENCOUNTER — OFFICE VISIT (OUTPATIENT)
Dept: VASCULAR ULTRASOUND | Facility: CLINIC | Age: 55
End: 2025-06-10
Attending: SPECIALIST
Payer: MEDICAID

## 2025-06-10 VITALS
TEMPERATURE: 97.9 F | HEART RATE: 88 BPM | SYSTOLIC BLOOD PRESSURE: 126 MMHG | DIASTOLIC BLOOD PRESSURE: 83 MMHG | OXYGEN SATURATION: 96 %

## 2025-06-10 DIAGNOSIS — I89.0 SECONDARY LYMPHEDEMA: ICD-10-CM

## 2025-06-10 DIAGNOSIS — I83.893 SYMPTOMATIC VARICOSE VEINS OF BOTH LOWER EXTREMITIES: ICD-10-CM

## 2025-06-10 DIAGNOSIS — I83.013 VENOUS STASIS ULCER OF RIGHT ANKLE LIMITED TO BREAKDOWN OF SKIN WITH VARICOSE VEINS (H): ICD-10-CM

## 2025-06-10 DIAGNOSIS — L97.311 VENOUS STASIS ULCER OF RIGHT ANKLE LIMITED TO BREAKDOWN OF SKIN WITH VARICOSE VEINS (H): ICD-10-CM

## 2025-06-10 PROCEDURE — 3079F DIAST BP 80-89 MM HG: CPT

## 2025-06-10 PROCEDURE — 3074F SYST BP LT 130 MM HG: CPT

## 2025-06-10 PROCEDURE — 250N000009 HC RX 250: Performed by: SPECIALIST

## 2025-06-10 PROCEDURE — C1886 CATHETER, ABLATION: HCPCS

## 2025-06-10 PROCEDURE — 250N000011 HC RX IP 250 OP 636: Performed by: SPECIALIST

## 2025-06-10 PROCEDURE — 258N000003 HC RX IP 258 OP 636: Performed by: SPECIALIST

## 2025-06-10 PROCEDURE — 36475 ENDOVENOUS RF 1ST VEIN: CPT | Mod: LT | Performed by: SPECIALIST

## 2025-06-10 PROCEDURE — 1126F AMNT PAIN NOTED NONE PRSNT: CPT

## 2025-06-10 RX ADMIN — LIDOCAINE HYDROCHLORIDE: 10 INJECTION, SOLUTION INFILTRATION; PERINEURAL at 12:38

## 2025-06-10 RX ADMIN — LIDOCAINE HYDROCHLORIDE 10 ML: 10 INJECTION, SOLUTION INFILTRATION; PERINEURAL at 12:40

## 2025-06-10 ASSESSMENT — PAIN SCALES - GENERAL: PAINLEVEL_OUTOF10: NO PAIN (0)

## 2025-06-10 NOTE — PATIENT INSTRUCTIONS
Discharge Instructions Following Venous Closure  Today, you had this procedure done:   - Vein closure using RadioFrequency Ablation (RFA)    Dressing  Leakage from the numbing medications the first few hours is normal if you had an RFA.   Wear your thigh high compression for 48 hours continuously until your ultrasound after the procedure.  It is ok to remove your stocking to shower in 24 hours but then reapply after.  Wear the thigh high stocking for two weeks after the procedure while you are up. It is ok to take off when you sleep.   After two weeks, you can transition into compression stockings of your choice.     Activity  On the day of the procedure, make sure to walk around the house for a few minutes each hour until bedtime.  The day after the procedure you should advance activity as tolerated.  NO strenuous activities though for 2 weeks.  In general, avoid prolonged standing in place and sitting with your legs down.  Both activities will cause blood to pool in your legs resulting in more swelling and discomfort.  Do not drive or operate heavy machinery for 24 hours after the procedure (excludes if you had a VenaSeal).   Do not take baths or use hot tubs or swimming pools until your incision site is healed.  Do not fly for the next 3 weeks.  Do not lift > 20 lbs. for 2 weeks.     Post Procedure Ultrasound & Follow-up  You will need an ultrasound within 2-3 days following the closure procedure.  Then plan to see your surgeon in the office six weeks after your procedure. Call us to schedule this appointment if one has not already been made.  Post Procedure Signs and Symptoms  There can be a mild amount of bruising and numbness after this procedure.  This will typically take a few weeks to fade.  You may feel pain or tenderness in your inner thigh.  Mild pain medication such as Tylenol or Ibuprofen maybe helpful.  It is normal to have fluid leaking from the injection areas the day of the procedure and it may be  blood tinged.      Call your healthcare provider if you have any of the following:  Fever of 100.4 F (38 C) or higher, or as directed by your provider  Chest pain or trouble breathing  Signs of infection at the catheter insertion site. These include increased redness or swelling (inflammation), warmth, increasing pain, bleeding, or bad-smelling discharge.  Severe numbness or tingling in the treated leg  Severe pain or swelling in the treated leg  For emergencies after 4:30pm Monday - Friday, holidays and weekends:  Dr. Baljeet Paz, [facility] at [phone]  Dr. Marcio Sanon or Dr. Digna Barclay, [facility] at [phone]  Thank you for allowing us to be part of your care

## 2025-06-12 ENCOUNTER — ANCILLARY PROCEDURE (OUTPATIENT)
Dept: VASCULAR ULTRASOUND | Facility: CLINIC | Age: 55
End: 2025-06-12
Attending: SPECIALIST
Payer: MEDICAID

## 2025-06-12 DIAGNOSIS — I83.893 SYMPTOMATIC VARICOSE VEINS OF BOTH LOWER EXTREMITIES: ICD-10-CM

## 2025-06-12 DIAGNOSIS — I89.0 SECONDARY LYMPHEDEMA: ICD-10-CM

## 2025-06-12 DIAGNOSIS — I83.013 VENOUS STASIS ULCER OF RIGHT ANKLE LIMITED TO BREAKDOWN OF SKIN WITH VARICOSE VEINS (H): ICD-10-CM

## 2025-06-12 DIAGNOSIS — L97.311 VENOUS STASIS ULCER OF RIGHT ANKLE LIMITED TO BREAKDOWN OF SKIN WITH VARICOSE VEINS (H): ICD-10-CM

## 2025-06-12 PROCEDURE — 93971 EXTREMITY STUDY: CPT | Mod: 26 | Performed by: STUDENT IN AN ORGANIZED HEALTH CARE EDUCATION/TRAINING PROGRAM

## 2025-06-12 PROCEDURE — 93971 EXTREMITY STUDY: CPT | Mod: LT

## 2025-06-17 ENCOUNTER — TELEPHONE (OUTPATIENT)
Dept: VASCULAR SURGERY | Facility: CLINIC | Age: 55
End: 2025-06-17
Payer: MEDICAID

## 2025-06-17 ENCOUNTER — MYC MEDICAL ADVICE (OUTPATIENT)
Dept: VASCULAR SURGERY | Facility: CLINIC | Age: 55
End: 2025-06-17
Payer: MEDICAID

## 2025-06-17 NOTE — TELEPHONE ENCOUNTER
Spoke with patient and father via phone. Patient is concerned with two areas of redness below his knee. The areas are tender and warm to the touch. Patient underwent RFA to L GSV on 06/10/2025. They will send photos via [facility] for review.

## 2025-06-18 ENCOUNTER — RESULTS FOLLOW-UP (OUTPATIENT)
Dept: FAMILY MEDICINE | Facility: CLINIC | Age: 55
End: 2025-06-18

## 2025-06-20 NOTE — ANESTHESIA PREPROCEDURE EVALUATION
Anesthesia Pre-Procedure Evaluation    Patient: Ciro Monet   MRN: [id number] : 1970        Preoperative Diagnosis: Rotator cuff tear [M75.100]    Procedure : Procedure(s):  Shoulder Arthroscopy Acromioclavicular Resection,Biceps Tenodesis,Rotator Cuff Repair          Past Medical History:   Diagnosis Date     Hiatal hernia      Morbid obesity with BMI of 45.0-49.9, adult (H)      CHARITY (obstructive sleep apnea) 2010      CPAP 15      Past Surgical History:   Procedure Laterality Date     HC REPAIR EPIGASTRIC HERNIA,REDUC  07     HC TOOTH EXTRACTION W/FORCEP       HEMORRHOID INJECTION SCLEROSING SOLUTION       HERNIORRHAPHY UMBILICAL N/A 10/29/2016    Procedure: HERNIORRHAPHY UMBILICAL;  Surgeon: Lori Barron MD;  Location:  OR     [facility]  ??      Allergies   Allergen Reactions     Adhesive Tape Rash     3M Plastic adhesive transpore tape per dad. Blisters and rash.     Pine Swelling and Difficulty breathing     Eyes swelling & difficultly breathing.      Social History     Tobacco Use     Smoking status: Never Smoker     Smokeless tobacco: Never Used   Substance Use Topics     Alcohol use: No     Alcohol/week: 0.0 standard drinks      Wt Readings from Last 1 Encounters:   22 (!) 195 kg (430 lb)        Anesthesia Evaluation   Pt has had prior anesthetic.     History of anesthetic complications       ROS/MED HX  ENT/Pulmonary:     (+) sleep apnea, severe, uses CPAP,     Neurologic:  - neg neurologic ROS     Cardiovascular:  - neg cardiovascular ROS   (+) -----Irregular Heartbeat/Palpitations,     METS/Exercise Tolerance: 1 - Eating, dressing    Hematologic:  - neg hematologic  ROS     Musculoskeletal:  - neg musculoskeletal ROS     GI/Hepatic:     (+) GERD, hiatal hernia,     Renal/Genitourinary:     (+) renal disease,     Endo:     (+) Obesity,     Psychiatric/Substance Use:  - neg psychiatric ROS     Infectious Disease:       Malignancy:       Other:  - neg other ROS     Writer spoke with pts mom, Radha, and advised her the Rx has been sent to the pharmacy. Mom verbalized understanding.              Physical Exam    Airway        Mallampati: III   TM distance: > 3 FB   Neck ROM: full   Mouth opening: > 3 cm    Respiratory Devices and Support         Dental  no notable dental history         Cardiovascular   cardiovascular exam normal          Pulmonary   pulmonary exam normal                OUTSIDE LABS:  CBC:   Lab Results   Component Value Date    WBC 7.8 02/17/2022    WBC 8.9 09/29/2021    HGB 14.6 02/17/2022    HGB 14.3 09/29/2021    HCT 44.6 02/17/2022    HCT 41.9 09/29/2021     02/17/2022     09/29/2021     BMP:   Lab Results   Component Value Date     01/26/2022     09/29/2021    POTASSIUM 4.5 02/17/2022    POTASSIUM 4.1 01/26/2022    CHLORIDE 107 01/26/2022    CHLORIDE 106 09/29/2021    CO2 33 (H) 01/26/2022    CO2 22 09/29/2021    BUN 20 01/26/2022    BUN 16 09/29/2021    CR 0.73 02/17/2022    CR 0.73 01/26/2022     (H) 01/26/2022    GLC 76 09/29/2021     COAGS:   Lab Results   Component Value Date    INR 0.97 02/17/2022     POC:   Lab Results   Component Value Date    BGM 78 11/03/2016     HEPATIC:   Lab Results   Component Value Date    ALBUMIN 3.8 09/29/2021    PROTTOTAL 7.5 09/29/2021    ALT 29 09/29/2021    AST 29 09/29/2021    ALKPHOS 81 09/29/2021    BILITOTAL 0.7 09/29/2021     OTHER:   Lab Results   Component Value Date    A1C 5.9 (H) 01/26/2022    MAURY 9.6 01/26/2022    MAG 2.2 10/30/2016    LIPASE 145 10/28/2016    TSH 1.61 10/06/2012    T4 0.91 03/11/2005       Anesthesia Plan    ASA Status:  4      Anesthesia Type: General.     - Airway: ETT   Induction: Intravenous.   Maintenance: Inhalation.        Consents    Anesthesia Plan(s) and associated risks, benefits, and realistic alternatives discussed. Questions answered and patient/representative(s) expressed understanding.     - Discussed: Risks, Benefits and Alternatives for BOTH SEDATION and the PROCEDURE were discussed     - Discussed with:  Patient         Postoperative Care       PONV prophylaxis:  Ondansetron (or other 5HT-3), Dexamethasone or Solumedrol     Comments:    Other Comments: Possible right interscalene block, intractable pain only. Thorough discussion with patient and father regarding likely oxygenation and pain management issues. Both understand high possibility of escalating respiratory support and hospitalization.            MARIA INES Camilo CRNA

## 2025-07-02 NOTE — PATIENT INSTRUCTIONS
"Brenda Tanner,    Thank you for entrusting your care with us today. After your visit today with MD Baljeet Paz this is the plan that was discussed at your appointment.    Dr. Paz would like to see you in person in about 4 months. If this does not work for you please call to reschedule.       I am including additional information on these things and our contact information if you have any questions or concerns.   Please do not hesitate to reach out to us if you felt we did not answer your questions or you are unsure of the treatment plan after your visit today. Our number is [phone].Thank you for trusting us with your care.       Again thank you for your time.       We would like you to follow up in about 4 months. You can resume your normal activities. It is important to remember that venous reflux disease is a life-long disease, and you should wear compression stockings as much as you can. They do not need to be worn at night while in bed. It is especially important to wear compression with long periods of sitting, standing, long car rides or if you will be flying. Compression socks should get refilled every 4-6 months. If you do a lot of standing it is good to do calf raises to help keep the blood pumping. If you sit a lot at work, it is good to get up periodically to walk around. Elevation of the foot of your bed 4-6\" helps the blood return back to where it is needed. We can refill your compression prescription for 1 year otherwise your primary care provider is able to refill them for you. Please call us sooner if you are having any concerns or problems prior to your follow up. Below is a list of places you may go to get your compression stockings.      [facility]  [address] [place] [address]  Phone: [phone]  Fax: [phone] [facility]  [address] [place], [place] " [address]  Phone: [phone]  Fax: [phone]   [facility]  [address] [place] [address]  Phone: [phone]  Fax: [phone] [facility]  [facility] [place] [address]  Phone: [phone]  Fax: [phone]   [facility]  [address] [place] [address]  Phone: [phone] [facility]   [address] ([facility])  [place] [address]  Phone: [phone]   [facility]  [address] [place] [address]      Phone: [phone]  Fax: [phone] [place]  [address] [place] [address]      Phone: [phone]  Fax: [phone]     [facility] [email or web address]  [address], [place] [address]  [phone]    [facility]  [email or web address]  [address] [place] [address]  Phone: [phone]     [address] [place] [address]  Phone: [phone]    [address] [place] [address]  Phone: [phone] [address], [place] [address]  Phone: [phone] [address], [place] [address]  Phone: [phone]    [facility] [email or web address]  [address] [place] [address]  Phone: [phone]    [address], [place] [address]  Phone: [phone]    [address], [place] [address]  Phone: [phone]    Darrin Lawrence  [email or web address]  [phone]

## 2025-07-22 ENCOUNTER — OFFICE VISIT (OUTPATIENT)
Dept: VASCULAR SURGERY | Facility: CLINIC | Age: 55
End: 2025-07-22
Attending: SPECIALIST
Payer: MEDICAID

## 2025-07-22 VITALS — SYSTOLIC BLOOD PRESSURE: 124 MMHG | HEART RATE: 64 BPM | OXYGEN SATURATION: 97 % | DIASTOLIC BLOOD PRESSURE: 64 MMHG

## 2025-07-22 DIAGNOSIS — I83.893 SYMPTOMATIC VARICOSE VEINS OF BOTH LOWER EXTREMITIES: Primary | ICD-10-CM

## 2025-07-22 DIAGNOSIS — I89.0 SECONDARY LYMPHEDEMA: ICD-10-CM

## 2025-07-22 PROCEDURE — 3074F SYST BP LT 130 MM HG: CPT | Performed by: SPECIALIST

## 2025-07-22 PROCEDURE — 99212 OFFICE O/P EST SF 10 MIN: CPT | Performed by: SPECIALIST

## 2025-07-22 PROCEDURE — 3078F DIAST BP <80 MM HG: CPT | Performed by: SPECIALIST

## 2025-07-22 PROCEDURE — G0463 HOSPITAL OUTPT CLINIC VISIT: HCPCS | Performed by: SPECIALIST

## 2025-07-22 PROCEDURE — 1126F AMNT PAIN NOTED NONE PRSNT: CPT | Performed by: SPECIALIST

## 2025-07-22 ASSESSMENT — PAIN SCALES - GENERAL: PAINLEVEL_OUTOF10: NO PAIN (0)

## 2025-07-22 NOTE — PROGRESS NOTES
Vascular Clinic Rooming Questions      Patient is here for 6 week  follow up  for Varicose Veins. The patient does  wear compression stockings. The patient has varicose veins that are problematic in bilateral legs. Patient states their varicose veins are bothersome when standing, sitting, working, and household chores. The patient has been using medications for their leg discomfort.      /64 (BP Location: Right arm)   Pulse 64   SpO2 97%     The provider has been notified that the patient has no concerns.     Questions patient would like addressed today are: N/A.    Refills are needed: N/A    Has homecare services and agency name:  No

## 2025-07-24 NOTE — PROGRESS NOTES
Post Procedure Note Endovenous Closure    S: Ciro Monet is a 54 year old male S/P Left  greater saphenous vein leg endovenous closure ofRight  ankle  lower ext. 6 weeks out from procedure. Doing well, wore male  thigh high socks. Minimal discomfort. Some superficial phlibitis. Which is resolving.     O:   Vitals:    07/22/25 1047   BP: 124/64   BP Location: Right arm   Pulse: 64   SpO2: 97%       Status: doing well, superficial phlebitis, and has discoloration    General: no apparent distress  Legs look good no signs of infection, incisions healing nicely.                      Side:: Bilateral  VCSS  PAIN:: Absent: None  Varicose Veins:: Mild: Few scattered  Venous Edema:: Mild: Evening ankle swelling only  Skin Pigmentation:: Mild: Diffuse, but limited in area and old (brown)  Inflamation:: Absent: None  Induration:: Absent: None  Number of active ulcers:: 0  Active ulcer duration:: None  Active ulcer diameter:: None  Compression Therapy:: Full compliance stockings + elevation  VCSS Score:: 6  CEAP:: A healed venous ulcer  Patient reported symptoms  Vein Appearance: Slightly noticeable  Heaviness: a little of the time  Achiness: none of the time  Swelling: some of the time  Throbbing: none of the time  Itching: a little of the time  Impact on work/activity: Moderately reduced work/activity    Imaging:    Reviewed and my interpretation of the US is left greater saphenous vein and right ankle     Exam Information    Exam Date Exam Time Accession # Performing Department Results    6/12/25  9:55 AM [id number] [facility]      PACS Images     Show images for US Venous Post Ablation Legs Bilateral     Study Result    Narrative & Impression   Left Venous Ultrasound Status Post Radiofrequency Ablation (Date: 06/12/25)        Indication: Follow-up saphenous vein Radiofrequency ablation     Date of Procedure: Left 6/10/25   Right 6/10/25        Bilateral CFV/SFJ Compression:  Fully Compressible        Reference:   (FC)-Fully Compressible           (PC)-Partially Compressible   (NC) Non-Compressible     Location Left GSV Right Ankle Perf.   Proximal Thigh NC     Mid Thigh NC     Distal Thigh NC     Knee NC     Proximal Calf NC     Mid Calf  FC     Ankle    FC         Comments: Right Ankle  is still patent.      Impression: Non compressible left GSV from proximal thigh to proximal calf consistent with procedural occlusion.  Patent caudal to access. Patent SFJ/CFV confluence without evidence of thrombus extension. Compressible right  vein at ankle without evidence of intraluminal thrombus.      Reference:     Compressibility: FC= Fully compressible, PC= Partially compressible, NC= Non-compressible, NV= Not Visualized     Venous Doppler: (+) = Present  (0) = Absent  (-) = Decreased/Unable to Evaluate, (NV) = Not Visualized           A/P: S/p endovenous closure. For insuffiencey of veins     May switch to knee high support socks   Resume all activities   RTC 6 months   Call for any questions or concerns     Baljeet Paz MD   [facility]

## 2025-07-27 DIAGNOSIS — I89.0 LYMPHEDEMA OF BOTH LOWER EXTREMITIES: ICD-10-CM

## 2025-07-28 RX ORDER — FUROSEMIDE 20 MG/1
20 TABLET ORAL DAILY
Qty: 90 TABLET | Refills: 0 | Status: SHIPPED | OUTPATIENT
Start: 2025-07-28

## 2025-07-28 NOTE — TELEPHONE ENCOUNTER
Prescription approved per [facility] Refill Protocol     Sherin Joshua     RN MSN

## 2025-08-13 ENCOUNTER — TRANSFERRED RECORDS (OUTPATIENT)
Dept: HEALTH INFORMATION MANAGEMENT | Facility: CLINIC | Age: 55
End: 2025-08-13
Payer: MEDICAID

## 2025-08-13 LAB — RETINOPATHY: NEGATIVE

## 2025-08-25 ENCOUNTER — PATIENT OUTREACH (OUTPATIENT)
Dept: CARE COORDINATION | Facility: CLINIC | Age: 55
End: 2025-08-25
Payer: MEDICAID

## (undated) DEVICE — SU FIBERWIRE 2 38"  AR-7200

## (undated) DEVICE — STOCKING SLEEVE COMPRESSION CALF LG

## (undated) DEVICE — TUBING ARTHROSCOPY PUMP ARTHREX AR-6410

## (undated) DEVICE — SU VICRYL 0 CT-1 36" J946H

## (undated) DEVICE — SYR 10ML FINGER CONTROL W/O NDL 309695

## (undated) DEVICE — DECANTER VIAL 2006S

## (undated) DEVICE — SU VICRYL 1 CT-1 36" UND J947H

## (undated) DEVICE — SUTURE PASSER SU CAPTURE SYS S&N FIRSTPASS 22-4038

## (undated) DEVICE — GLOVE PROTEXIS BLUE W/NEU-THERA 8.5  2D73EB85

## (undated) DEVICE — ARTHROSCOPIC CANNULA TWIST-IN PURPLE 7MMX7CM AR-6570

## (undated) DEVICE — KIT ENDO TURNOVER/PROCEDURE CARRY-ON 101822

## (undated) DEVICE — TAPE MEDIPORE 4"X10YD 2964

## (undated) DEVICE — BUR ARTHREX COOLCUT OVAL 8 FLUTE 5.0MMX13CM AR-8500OBE

## (undated) DEVICE — NDL SPINAL 18GA 3.5" 405184

## (undated) DEVICE — GOWN IMPERVIOUS SPECIALTY XLG/XLONG 32474

## (undated) DEVICE — PAD FLOOR SURGISAFE

## (undated) DEVICE — SLING ARM XLG 79-99158

## (undated) DEVICE — SOL WATER IRRIG 1000ML BOTTLE 07139-09

## (undated) DEVICE — DRSG STERI STRIP 1X5" R1548

## (undated) DEVICE — SU FIBERTAPE 2MM  AR-7237-7

## (undated) DEVICE — BUR ARTHREX COOLCUT SABRE 4.0MMX13CM AR-8400SR

## (undated) DEVICE — GLOVE PROTEXIS W/NEU-THERA 7.0  2D73TE70

## (undated) DEVICE — SUCTION MANIFOLD NEPTUNE 2 SYS 1 PORT 702-025-000

## (undated) DEVICE — GLOVE PROTEXIS W/NEU-THERA 8.0  2D73TE80

## (undated) DEVICE — SU STRATAFIX MONOCRYL 3-0 SPIRAL PS-2 30CM SXMP1B106

## (undated) DEVICE — SU ETHIBOND 1 CT-1 30" X425H

## (undated) DEVICE — GLOVE PROTEXIS BLUE W/NEU-THERA 7.5  2D73EB75

## (undated) DEVICE — ABLATOR ARTHREX APOLLO RF MP90 ASPIRATING 90DEG AR-9811

## (undated) DEVICE — SOL NACL 0.9% IRRIG 1000ML BOTTLE 07138-09

## (undated) DEVICE — SOL WATER IRRIG 1000ML BOTTLE 2F7114

## (undated) DEVICE — SUCTION TIP FLEXI CLEAR TIP DISP K62

## (undated) DEVICE — SU TIGERTAPE 2MMX7"  AR-7237-7T

## (undated) DEVICE — DRSG ADAPTIC 3X8" X3

## (undated) DEVICE — DRSG ABDOMINAL 07 1/2X8" 7197D

## (undated) DEVICE — SU VICRYL 2-0 CT-1 36" UND J945H

## (undated) DEVICE — SOL NACL 0.9% IRRIG 3000ML BAG 07972-08

## (undated) DEVICE — SU FIBERLINK #2 BRAIDED PB BLUE W/1.5" CLSD LOOP  AR-7235

## (undated) DEVICE — PREP CHLORAPREP 26ML TINTED ORANGE  260815

## (undated) DEVICE — PACK SHOULDER

## (undated) DEVICE — TUBING SUCTION 6"X3/16" N56A

## (undated) DEVICE — SU MONOCRYL 3-0 PS-2 18" UND Y497G

## (undated) DEVICE — SU ETHIBOND 1 OS-6 36" X538

## (undated) DEVICE — SU ETHILON 3-0 PS-2 18" 1669H

## (undated) DEVICE — DRAPE ARTHROSCOPY SHOULDER BEACHCHAIR 29369

## (undated) RX ORDER — ONDANSETRON 2 MG/ML
INJECTION INTRAMUSCULAR; INTRAVENOUS
Status: DISPENSED
Start: 2022-02-17

## (undated) RX ORDER — ROPIVACAINE HYDROCHLORIDE 5 MG/ML
INJECTION, SOLUTION EPIDURAL; INFILTRATION; PERINEURAL
Status: DISPENSED
Start: 2022-02-17

## (undated) RX ORDER — EPHEDRINE SULFATE 50 MG/ML
INJECTION, SOLUTION INTRAMUSCULAR; INTRAVENOUS; SUBCUTANEOUS
Status: DISPENSED
Start: 2022-02-17

## (undated) RX ORDER — ACETAMINOPHEN 325 MG/1
TABLET ORAL
Status: DISPENSED
Start: 2022-02-17

## (undated) RX ORDER — OXYCODONE HYDROCHLORIDE 5 MG/1
TABLET ORAL
Status: DISPENSED
Start: 2022-02-17

## (undated) RX ORDER — DEXAMETHASONE SODIUM PHOSPHATE 4 MG/ML
INJECTION, SOLUTION INTRA-ARTICULAR; INTRALESIONAL; INTRAMUSCULAR; INTRAVENOUS; SOFT TISSUE
Status: DISPENSED
Start: 2022-02-17

## (undated) RX ORDER — LIDOCAINE HYDROCHLORIDE 10 MG/ML
INJECTION, SOLUTION EPIDURAL; INFILTRATION; INTRACAUDAL; PERINEURAL
Status: DISPENSED
Start: 2020-10-23

## (undated) RX ORDER — LIDOCAINE HYDROCHLORIDE 10 MG/ML
INJECTION, SOLUTION EPIDURAL; INFILTRATION; INTRACAUDAL; PERINEURAL
Status: DISPENSED
Start: 2022-02-17

## (undated) RX ORDER — EPINEPHRINE 1 MG/ML
INJECTION, SOLUTION, CONCENTRATE INTRAVENOUS
Status: DISPENSED
Start: 2021-12-06

## (undated) RX ORDER — FENTANYL CITRATE-0.9 % NACL/PF 10 MCG/ML
PLASTIC BAG, INJECTION (ML) INTRAVENOUS
Status: DISPENSED
Start: 2022-02-17

## (undated) RX ORDER — LIDOCAINE HYDROCHLORIDE 10 MG/ML
INJECTION, SOLUTION EPIDURAL; INFILTRATION; INTRACAUDAL; PERINEURAL
Status: DISPENSED
Start: 2021-12-06

## (undated) RX ORDER — LIDOCAINE HYDROCHLORIDE 10 MG/ML
INJECTION, SOLUTION EPIDURAL; INFILTRATION; INTRACAUDAL; PERINEURAL
Status: DISPENSED
Start: 2025-02-25

## (undated) RX ORDER — GABAPENTIN 300 MG/1
CAPSULE ORAL
Status: DISPENSED
Start: 2022-02-17

## (undated) RX ORDER — DEXMEDETOMIDINE HYDROCHLORIDE 100 UG/ML
INJECTION, SOLUTION INTRAVENOUS
Status: DISPENSED
Start: 2022-02-17

## (undated) RX ORDER — PROPOFOL 10 MG/ML
INJECTION, EMULSION INTRAVENOUS
Status: DISPENSED
Start: 2022-02-17

## (undated) RX ORDER — IPRATROPIUM BROMIDE AND ALBUTEROL SULFATE 2.5; .5 MG/3ML; MG/3ML
SOLUTION RESPIRATORY (INHALATION)
Status: DISPENSED
Start: 2022-02-17

## (undated) RX ORDER — HYDROMORPHONE HCL IN WATER/PF 6 MG/30 ML
PATIENT CONTROLLED ANALGESIA SYRINGE INTRAVENOUS
Status: DISPENSED
Start: 2022-02-17

## (undated) RX ORDER — GLYCOPYRROLATE 0.2 MG/ML
INJECTION, SOLUTION INTRAMUSCULAR; INTRAVENOUS
Status: DISPENSED
Start: 2020-10-23

## (undated) RX ORDER — CEFAZOLIN SODIUM IN 0.9 % NACL 3 G/100 ML
INTRAVENOUS SOLUTION, PIGGYBACK (ML) INTRAVENOUS
Status: DISPENSED
Start: 2022-02-17

## (undated) RX ORDER — KETOROLAC TROMETHAMINE 30 MG/ML
INJECTION, SOLUTION INTRAMUSCULAR; INTRAVENOUS
Status: DISPENSED
Start: 2022-02-17

## (undated) RX ORDER — CITRIC ACID/SODIUM CITRATE 334-500MG
SOLUTION, ORAL ORAL
Status: DISPENSED
Start: 2025-03-04

## (undated) RX ORDER — PROPOFOL 10 MG/ML
INJECTION, EMULSION INTRAVENOUS
Status: DISPENSED
Start: 2020-10-23

## (undated) RX ORDER — DEXAMETHASONE SODIUM PHOSPHATE 10 MG/ML
INJECTION, SOLUTION INTRAMUSCULAR; INTRAVENOUS
Status: DISPENSED
Start: 2022-02-17

## (undated) RX ORDER — MAGNESIUM SULFATE HEPTAHYDRATE 40 MG/ML
INJECTION, SOLUTION INTRAVENOUS
Status: DISPENSED
Start: 2022-02-17

## (undated) RX ORDER — FENTANYL CITRATE 50 UG/ML
INJECTION, SOLUTION INTRAMUSCULAR; INTRAVENOUS
Status: DISPENSED
Start: 2022-02-17

## (undated) RX ORDER — GLYCOPYRROLATE 0.2 MG/ML
INJECTION, SOLUTION INTRAMUSCULAR; INTRAVENOUS
Status: DISPENSED
Start: 2022-02-17